# Patient Record
Sex: MALE | Race: WHITE
[De-identification: names, ages, dates, MRNs, and addresses within clinical notes are randomized per-mention and may not be internally consistent; named-entity substitution may affect disease eponyms.]

---

## 2017-08-15 ENCOUNTER — RX ONLY (OUTPATIENT)
Age: 77
Setting detail: RX ONLY
End: 2017-08-15

## 2017-08-29 PROBLEM — D49.2 NEOPLASM OF UNSPECIFIED BEHAVIOR OF BONE, SOFT TISSUE, AND SKIN: Status: RESOLVED | Noted: 2017-08-15 | Resolved: 2017-08-29

## 2021-01-15 DIAGNOSIS — Z23 NEED FOR VACCINATION: ICD-10-CM

## 2022-01-23 ENCOUNTER — APPOINTMENT (OUTPATIENT)
Dept: CARDIOLOGY | Facility: MEDICAL CENTER | Age: 82
DRG: 871 | End: 2022-01-23
Attending: STUDENT IN AN ORGANIZED HEALTH CARE EDUCATION/TRAINING PROGRAM
Payer: COMMERCIAL

## 2022-01-23 ENCOUNTER — HOSPITAL ENCOUNTER (INPATIENT)
Facility: MEDICAL CENTER | Age: 82
LOS: 1 days | DRG: 871 | End: 2022-01-24
Attending: EMERGENCY MEDICINE | Admitting: STUDENT IN AN ORGANIZED HEALTH CARE EDUCATION/TRAINING PROGRAM
Payer: COMMERCIAL

## 2022-01-23 ENCOUNTER — APPOINTMENT (OUTPATIENT)
Dept: RADIOLOGY | Facility: MEDICAL CENTER | Age: 82
DRG: 871 | End: 2022-01-23
Attending: EMERGENCY MEDICINE
Payer: COMMERCIAL

## 2022-01-23 ENCOUNTER — APPOINTMENT (OUTPATIENT)
Dept: RADIOLOGY | Facility: MEDICAL CENTER | Age: 82
DRG: 871 | End: 2022-01-23
Attending: STUDENT IN AN ORGANIZED HEALTH CARE EDUCATION/TRAINING PROGRAM
Payer: COMMERCIAL

## 2022-01-23 DIAGNOSIS — J18.9 PNEUMONIA OF BOTH LOWER LOBES DUE TO INFECTIOUS ORGANISM: ICD-10-CM

## 2022-01-23 DIAGNOSIS — R06.03 RESPIRATORY DISTRESS: ICD-10-CM

## 2022-01-23 DIAGNOSIS — I50.21 ACUTE SYSTOLIC HEART FAILURE (HCC): ICD-10-CM

## 2022-01-23 DIAGNOSIS — J44.1 CHRONIC OBSTRUCTIVE PULMONARY DISEASE WITH ACUTE EXACERBATION (HCC): ICD-10-CM

## 2022-01-23 DIAGNOSIS — J96.01 ACUTE RESPIRATORY FAILURE WITH HYPOXIA (HCC): ICD-10-CM

## 2022-01-23 PROBLEM — N17.9 ACUTE RENAL FAILURE (ARF) (HCC): Status: ACTIVE | Noted: 2022-01-23

## 2022-01-23 PROBLEM — A41.9 SEPSIS (HCC): Status: ACTIVE | Noted: 2022-01-23

## 2022-01-23 PROBLEM — I10 PRIMARY HYPERTENSION: Status: ACTIVE | Noted: 2022-01-23

## 2022-01-23 PROBLEM — E87.20 LACTIC ACIDOSIS: Status: ACTIVE | Noted: 2022-01-23

## 2022-01-23 PROBLEM — N40.0 BPH (BENIGN PROSTATIC HYPERPLASIA): Status: ACTIVE | Noted: 2022-01-23

## 2022-01-23 PROBLEM — R79.89 ELEVATED TROPONIN: Status: ACTIVE | Noted: 2022-01-23

## 2022-01-23 PROBLEM — R91.8 PULMONARY INFILTRATES: Status: ACTIVE | Noted: 2022-01-23

## 2022-01-23 PROBLEM — D72.829 LEUKOCYTOSIS: Status: ACTIVE | Noted: 2022-01-23

## 2022-01-23 PROBLEM — E78.5 DYSLIPIDEMIA: Status: ACTIVE | Noted: 2022-01-23

## 2022-01-23 LAB
ALBUMIN SERPL BCP-MCNC: 4.4 G/DL (ref 3.2–4.9)
ALBUMIN/GLOB SERPL: 1.5 G/DL
ALP SERPL-CCNC: 133 U/L (ref 30–99)
ALT SERPL-CCNC: <5 U/L (ref 2–50)
AMPHET UR QL SCN: NEGATIVE
ANION GAP SERPL CALC-SCNC: 14 MMOL/L (ref 7–16)
APPEARANCE UR: CLEAR
AST SERPL-CCNC: 16 U/L (ref 12–45)
BACTERIA #/AREA URNS HPF: ABNORMAL /HPF
BARBITURATES UR QL SCN: NEGATIVE
BASOPHILS # BLD AUTO: 0.6 % (ref 0–1.8)
BASOPHILS # BLD: 0.09 K/UL (ref 0–0.12)
BENZODIAZ UR QL SCN: NEGATIVE
BILIRUB SERPL-MCNC: 0.4 MG/DL (ref 0.1–1.5)
BILIRUB UR QL STRIP.AUTO: NEGATIVE
BUN SERPL-MCNC: 27 MG/DL (ref 8–22)
BZE UR QL SCN: NEGATIVE
CALCIUM SERPL-MCNC: 9.3 MG/DL (ref 8.4–10.2)
CANNABINOIDS UR QL SCN: NEGATIVE
CHLORIDE SERPL-SCNC: 105 MMOL/L (ref 96–112)
CO2 SERPL-SCNC: 20 MMOL/L (ref 20–33)
COLOR UR: YELLOW
CREAT SERPL-MCNC: 1.49 MG/DL (ref 0.5–1.4)
EKG IMPRESSION: NORMAL
EOSINOPHIL # BLD AUTO: 0.13 K/UL (ref 0–0.51)
EOSINOPHIL NFR BLD: 0.9 % (ref 0–6.9)
EPI CELLS #/AREA URNS HPF: NEGATIVE /HPF
ERYTHROCYTE [DISTWIDTH] IN BLOOD BY AUTOMATED COUNT: 49 FL (ref 35.9–50)
FLUAV RNA SPEC QL NAA+PROBE: NEGATIVE
FLUBV RNA SPEC QL NAA+PROBE: NEGATIVE
GLOBULIN SER CALC-MCNC: 3 G/DL (ref 1.9–3.5)
GLUCOSE SERPL-MCNC: 188 MG/DL (ref 65–99)
GLUCOSE UR STRIP.AUTO-MCNC: NEGATIVE MG/DL
HCT VFR BLD AUTO: 52.9 % (ref 42–52)
HGB BLD-MCNC: 17 G/DL (ref 14–18)
HYALINE CASTS #/AREA URNS LPF: ABNORMAL /LPF
IMM GRANULOCYTES # BLD AUTO: 0.09 K/UL (ref 0–0.11)
IMM GRANULOCYTES NFR BLD AUTO: 0.6 % (ref 0–0.9)
KETONES UR STRIP.AUTO-MCNC: NEGATIVE MG/DL
LACTATE BLD-SCNC: 2.4 MMOL/L (ref 0.5–2)
LACTATE BLD-SCNC: 2.4 MMOL/L (ref 0.5–2)
LACTATE BLD-SCNC: 2.5 MMOL/L (ref 0.5–2)
LACTATE BLD-SCNC: 3 MMOL/L (ref 0.5–2)
LACTATE BLD-SCNC: 3.1 MMOL/L (ref 0.5–2)
LEUKOCYTE ESTERASE UR QL STRIP.AUTO: NEGATIVE
LV EJECT FRACT  99904: 20
LV EJECT FRACT MOD 2C 99903: 26.62
LV EJECT FRACT MOD 4C 99902: 20.97
LV EJECT FRACT MOD BP 99901: 23.34
LYMPHOCYTES # BLD AUTO: 1.74 K/UL (ref 1–4.8)
LYMPHOCYTES NFR BLD: 12.1 % (ref 22–41)
MAGNESIUM SERPL-MCNC: 2.2 MG/DL (ref 1.5–2.5)
MCH RBC QN AUTO: 30.5 PG (ref 27–33)
MCHC RBC AUTO-ENTMCNC: 32.1 G/DL (ref 33.7–35.3)
MCV RBC AUTO: 94.8 FL (ref 81.4–97.8)
METHADONE UR QL SCN: NEGATIVE
MICRO URNS: ABNORMAL
MONOCYTES # BLD AUTO: 1.33 K/UL (ref 0–0.85)
MONOCYTES NFR BLD AUTO: 9.2 % (ref 0–13.4)
MUCOUS THREADS #/AREA URNS HPF: ABNORMAL /HPF
NEUTROPHILS # BLD AUTO: 11.01 K/UL (ref 1.82–7.42)
NEUTROPHILS NFR BLD: 76.6 % (ref 44–72)
NITRITE UR QL STRIP.AUTO: POSITIVE
NRBC # BLD AUTO: 0 K/UL
NRBC BLD-RTO: 0 /100 WBC
NT-PROBNP SERPL IA-MCNC: 9045 PG/ML (ref 0–125)
OPIATES UR QL SCN: NEGATIVE
OXYCODONE UR QL SCN: NEGATIVE
PCP UR QL SCN: NEGATIVE
PH UR STRIP.AUTO: 5.5 [PH] (ref 5–8)
PHOSPHATE SERPL-MCNC: 3.4 MG/DL (ref 2.5–4.5)
PLATELET # BLD AUTO: 270 K/UL (ref 164–446)
PMV BLD AUTO: 11.3 FL (ref 9–12.9)
POTASSIUM SERPL-SCNC: 4.2 MMOL/L (ref 3.6–5.5)
PROCALCITONIN SERPL-MCNC: 0.06 NG/ML
PROPOXYPH UR QL SCN: NEGATIVE
PROT SERPL-MCNC: 7.4 G/DL (ref 6–8.2)
PROT UR QL STRIP: NEGATIVE MG/DL
PTH-INTACT SERPL-MCNC: 180 PG/ML (ref 14–72)
RBC # BLD AUTO: 5.58 M/UL (ref 4.7–6.1)
RBC # URNS HPF: ABNORMAL /HPF
RBC UR QL AUTO: NEGATIVE
RSV RNA SPEC QL NAA+PROBE: NEGATIVE
SARS-COV-2 RNA RESP QL NAA+PROBE: NOTDETECTED
SODIUM SERPL-SCNC: 139 MMOL/L (ref 135–145)
SP GR UR STRIP.AUTO: 1.02
SPECIMEN SOURCE: NORMAL
TROPONIN T SERPL-MCNC: 114 NG/L (ref 6–19)
TROPONIN T SERPL-MCNC: 115 NG/L (ref 6–19)
TROPONIN T SERPL-MCNC: 69 NG/L (ref 6–19)
TROPONIN T SERPL-MCNC: 90 NG/L (ref 6–19)
TROPONIN T SERPL-MCNC: 96 NG/L (ref 6–19)
WBC # BLD AUTO: 14.4 K/UL (ref 4.8–10.8)
WBC #/AREA URNS HPF: ABNORMAL /HPF

## 2022-01-23 PROCEDURE — 87186 SC STD MICRODIL/AGAR DIL: CPT

## 2022-01-23 PROCEDURE — 99285 EMERGENCY DEPT VISIT HI MDM: CPT

## 2022-01-23 PROCEDURE — 94640 AIRWAY INHALATION TREATMENT: CPT

## 2022-01-23 PROCEDURE — 87077 CULTURE AEROBIC IDENTIFY: CPT | Mod: 91

## 2022-01-23 PROCEDURE — 84156 ASSAY OF PROTEIN URINE: CPT

## 2022-01-23 PROCEDURE — C9803 HOPD COVID-19 SPEC COLLECT: HCPCS | Performed by: EMERGENCY MEDICINE

## 2022-01-23 PROCEDURE — 76775 US EXAM ABDO BACK WALL LIM: CPT

## 2022-01-23 PROCEDURE — 80053 COMPREHEN METABOLIC PANEL: CPT

## 2022-01-23 PROCEDURE — 83735 ASSAY OF MAGNESIUM: CPT

## 2022-01-23 PROCEDURE — 93306 TTE W/DOPPLER COMPLETE: CPT | Mod: 26 | Performed by: INTERNAL MEDICINE

## 2022-01-23 PROCEDURE — 82436 ASSAY OF URINE CHLORIDE: CPT

## 2022-01-23 PROCEDURE — 83605 ASSAY OF LACTIC ACID: CPT | Mod: 91

## 2022-01-23 PROCEDURE — 83970 ASSAY OF PARATHORMONE: CPT

## 2022-01-23 PROCEDURE — 700111 HCHG RX REV CODE 636 W/ 250 OVERRIDE (IP): Performed by: EMERGENCY MEDICINE

## 2022-01-23 PROCEDURE — 87150 DNA/RNA AMPLIFIED PROBE: CPT

## 2022-01-23 PROCEDURE — 84100 ASSAY OF PHOSPHORUS: CPT

## 2022-01-23 PROCEDURE — 93306 TTE W/DOPPLER COMPLETE: CPT

## 2022-01-23 PROCEDURE — 80307 DRUG TEST PRSMV CHEM ANLYZR: CPT

## 2022-01-23 PROCEDURE — 82570 ASSAY OF URINE CREATININE: CPT

## 2022-01-23 PROCEDURE — A9270 NON-COVERED ITEM OR SERVICE: HCPCS | Performed by: HOSPITALIST

## 2022-01-23 PROCEDURE — 96375 TX/PRO/DX INJ NEW DRUG ADDON: CPT

## 2022-01-23 PROCEDURE — 87086 URINE CULTURE/COLONY COUNT: CPT

## 2022-01-23 PROCEDURE — 770020 HCHG ROOM/CARE - TELE (206)

## 2022-01-23 PROCEDURE — 81001 URINALYSIS AUTO W/SCOPE: CPT

## 2022-01-23 PROCEDURE — 36415 COLL VENOUS BLD VENIPUNCTURE: CPT

## 2022-01-23 PROCEDURE — 96368 THER/DIAG CONCURRENT INF: CPT

## 2022-01-23 PROCEDURE — 94669 MECHANICAL CHEST WALL OSCILL: CPT

## 2022-01-23 PROCEDURE — 700102 HCHG RX REV CODE 250 W/ 637 OVERRIDE(OP): Performed by: STUDENT IN AN ORGANIZED HEALTH CARE EDUCATION/TRAINING PROGRAM

## 2022-01-23 PROCEDURE — 700117 HCHG RX CONTRAST REV CODE 255: Performed by: STUDENT IN AN ORGANIZED HEALTH CARE EDUCATION/TRAINING PROGRAM

## 2022-01-23 PROCEDURE — 96365 THER/PROPH/DIAG IV INF INIT: CPT

## 2022-01-23 PROCEDURE — A9270 NON-COVERED ITEM OR SERVICE: HCPCS | Performed by: STUDENT IN AN ORGANIZED HEALTH CARE EDUCATION/TRAINING PROGRAM

## 2022-01-23 PROCEDURE — 87040 BLOOD CULTURE FOR BACTERIA: CPT | Mod: 91

## 2022-01-23 PROCEDURE — 99223 1ST HOSP IP/OBS HIGH 75: CPT | Mod: AI | Performed by: STUDENT IN AN ORGANIZED HEALTH CARE EDUCATION/TRAINING PROGRAM

## 2022-01-23 PROCEDURE — 71045 X-RAY EXAM CHEST 1 VIEW: CPT

## 2022-01-23 PROCEDURE — 83880 ASSAY OF NATRIURETIC PEPTIDE: CPT

## 2022-01-23 PROCEDURE — 700105 HCHG RX REV CODE 258: Performed by: EMERGENCY MEDICINE

## 2022-01-23 PROCEDURE — 85025 COMPLETE CBC W/AUTO DIFF WBC: CPT

## 2022-01-23 PROCEDURE — 94760 N-INVAS EAR/PLS OXIMETRY 1: CPT

## 2022-01-23 PROCEDURE — 700101 HCHG RX REV CODE 250: Performed by: EMERGENCY MEDICINE

## 2022-01-23 PROCEDURE — 93005 ELECTROCARDIOGRAM TRACING: CPT | Performed by: EMERGENCY MEDICINE

## 2022-01-23 PROCEDURE — 84300 ASSAY OF URINE SODIUM: CPT

## 2022-01-23 PROCEDURE — 84133 ASSAY OF URINE POTASSIUM: CPT

## 2022-01-23 PROCEDURE — 700105 HCHG RX REV CODE 258: Performed by: STUDENT IN AN ORGANIZED HEALTH CARE EDUCATION/TRAINING PROGRAM

## 2022-01-23 PROCEDURE — 700102 HCHG RX REV CODE 250 W/ 637 OVERRIDE(OP): Performed by: HOSPITALIST

## 2022-01-23 PROCEDURE — 0241U HCHG SARS-COV-2 COVID-19 NFCT DS RESP RNA 4 TRGT MIC: CPT

## 2022-01-23 PROCEDURE — 700111 HCHG RX REV CODE 636 W/ 250 OVERRIDE (IP): Performed by: STUDENT IN AN ORGANIZED HEALTH CARE EDUCATION/TRAINING PROGRAM

## 2022-01-23 PROCEDURE — 84145 PROCALCITONIN (PCT): CPT

## 2022-01-23 PROCEDURE — 84484 ASSAY OF TROPONIN QUANT: CPT | Mod: 91

## 2022-01-23 RX ORDER — SODIUM CHLORIDE, SODIUM LACTATE, POTASSIUM CHLORIDE, AND CALCIUM CHLORIDE .6; .31; .03; .02 G/100ML; G/100ML; G/100ML; G/100ML
500 INJECTION, SOLUTION INTRAVENOUS
Status: DISCONTINUED | OUTPATIENT
Start: 2022-01-23 | End: 2022-01-23

## 2022-01-23 RX ORDER — ACETAMINOPHEN 325 MG/1
650 TABLET ORAL EVERY 6 HOURS PRN
Status: DISCONTINUED | OUTPATIENT
Start: 2022-01-23 | End: 2022-01-24 | Stop reason: HOSPADM

## 2022-01-23 RX ORDER — METHYLPREDNISOLONE SODIUM SUCCINATE 125 MG/2ML
125 INJECTION, POWDER, LYOPHILIZED, FOR SOLUTION INTRAMUSCULAR; INTRAVENOUS EVERY 8 HOURS
Status: DISCONTINUED | OUTPATIENT
Start: 2022-01-23 | End: 2022-01-24 | Stop reason: HOSPADM

## 2022-01-23 RX ORDER — SODIUM CHLORIDE, SODIUM LACTATE, POTASSIUM CHLORIDE, AND CALCIUM CHLORIDE .6; .31; .03; .02 G/100ML; G/100ML; G/100ML; G/100ML
500 INJECTION, SOLUTION INTRAVENOUS ONCE
Status: COMPLETED | OUTPATIENT
Start: 2022-01-23 | End: 2022-01-23

## 2022-01-23 RX ORDER — ONDANSETRON 2 MG/ML
4 INJECTION INTRAMUSCULAR; INTRAVENOUS EVERY 4 HOURS PRN
Status: DISCONTINUED | OUTPATIENT
Start: 2022-01-23 | End: 2022-01-24 | Stop reason: HOSPADM

## 2022-01-23 RX ORDER — BISACODYL 10 MG
10 SUPPOSITORY, RECTAL RECTAL
Status: DISCONTINUED | OUTPATIENT
Start: 2022-01-23 | End: 2022-01-24 | Stop reason: HOSPADM

## 2022-01-23 RX ORDER — TAMSULOSIN HYDROCHLORIDE 0.4 MG/1
0.4 CAPSULE ORAL EVERY EVENING
Status: DISCONTINUED | OUTPATIENT
Start: 2022-01-23 | End: 2022-01-24 | Stop reason: HOSPADM

## 2022-01-23 RX ORDER — LORAZEPAM 0.5 MG/1
0.5 TABLET ORAL EVERY 4 HOURS PRN
Status: DISPENSED | OUTPATIENT
Start: 2022-01-23 | End: 2022-01-24

## 2022-01-23 RX ORDER — TAMSULOSIN HYDROCHLORIDE 0.4 MG/1
0.4 CAPSULE ORAL EVERY EVENING
COMMUNITY
End: 2022-12-31

## 2022-01-23 RX ORDER — GUAIFENESIN/DEXTROMETHORPHAN 100-10MG/5
10 SYRUP ORAL EVERY 6 HOURS PRN
Status: DISCONTINUED | OUTPATIENT
Start: 2022-01-23 | End: 2022-01-24 | Stop reason: HOSPADM

## 2022-01-23 RX ORDER — ATORVASTATIN CALCIUM 40 MG/1
40 TABLET, FILM COATED ORAL DAILY
Status: DISCONTINUED | OUTPATIENT
Start: 2022-01-23 | End: 2022-01-24 | Stop reason: HOSPADM

## 2022-01-23 RX ORDER — POLYETHYLENE GLYCOL 3350 17 G/17G
1 POWDER, FOR SOLUTION ORAL
Status: DISCONTINUED | OUTPATIENT
Start: 2022-01-23 | End: 2022-01-24 | Stop reason: HOSPADM

## 2022-01-23 RX ORDER — HYDRALAZINE HYDROCHLORIDE 20 MG/ML
10 INJECTION INTRAMUSCULAR; INTRAVENOUS EVERY 4 HOURS PRN
Status: DISCONTINUED | OUTPATIENT
Start: 2022-01-23 | End: 2022-01-24 | Stop reason: HOSPADM

## 2022-01-23 RX ORDER — IPRATROPIUM BROMIDE AND ALBUTEROL SULFATE 2.5; .5 MG/3ML; MG/3ML
3 SOLUTION RESPIRATORY (INHALATION)
Status: DISCONTINUED | OUTPATIENT
Start: 2022-01-23 | End: 2022-01-24 | Stop reason: HOSPADM

## 2022-01-23 RX ORDER — LISINOPRIL 40 MG/1
40 TABLET ORAL DAILY
COMMUNITY

## 2022-01-23 RX ORDER — HEPARIN SODIUM 5000 [USP'U]/ML
5000 INJECTION, SOLUTION INTRAVENOUS; SUBCUTANEOUS EVERY 8 HOURS
Status: DISCONTINUED | OUTPATIENT
Start: 2022-01-23 | End: 2022-01-24 | Stop reason: HOSPADM

## 2022-01-23 RX ORDER — SPIRONOLACTONE 25 MG/1
12.5 TABLET ORAL
Status: DISCONTINUED | OUTPATIENT
Start: 2022-01-24 | End: 2022-01-24

## 2022-01-23 RX ORDER — AMOXICILLIN 250 MG
2 CAPSULE ORAL 2 TIMES DAILY
Status: DISCONTINUED | OUTPATIENT
Start: 2022-01-23 | End: 2022-01-24 | Stop reason: HOSPADM

## 2022-01-23 RX ORDER — LISINOPRIL 2.5 MG/1
2.5 TABLET ORAL
Status: DISCONTINUED | OUTPATIENT
Start: 2022-01-24 | End: 2022-01-24

## 2022-01-23 RX ORDER — SODIUM CHLORIDE, SODIUM LACTATE, POTASSIUM CHLORIDE, AND CALCIUM CHLORIDE .6; .31; .03; .02 G/100ML; G/100ML; G/100ML; G/100ML
500 INJECTION, SOLUTION INTRAVENOUS ONCE
Status: DISCONTINUED | OUTPATIENT
Start: 2022-01-23 | End: 2022-01-23

## 2022-01-23 RX ORDER — AZITHROMYCIN 500 MG/1
500 INJECTION, POWDER, LYOPHILIZED, FOR SOLUTION INTRAVENOUS ONCE
Status: COMPLETED | OUTPATIENT
Start: 2022-01-23 | End: 2022-01-23

## 2022-01-23 RX ORDER — AZITHROMYCIN 250 MG/1
500 TABLET, FILM COATED ORAL DAILY
Status: DISCONTINUED | OUTPATIENT
Start: 2022-01-24 | End: 2022-01-24 | Stop reason: HOSPADM

## 2022-01-23 RX ORDER — ATORVASTATIN CALCIUM 40 MG/1
40 TABLET, FILM COATED ORAL DAILY
COMMUNITY

## 2022-01-23 RX ORDER — FUROSEMIDE 40 MG/1
40 TABLET ORAL
Status: DISCONTINUED | OUTPATIENT
Start: 2022-01-23 | End: 2022-01-23

## 2022-01-23 RX ORDER — METHYLPREDNISOLONE SODIUM SUCCINATE 125 MG/2ML
125 INJECTION, POWDER, LYOPHILIZED, FOR SOLUTION INTRAMUSCULAR; INTRAVENOUS ONCE
Status: COMPLETED | OUTPATIENT
Start: 2022-01-23 | End: 2022-01-23

## 2022-01-23 RX ORDER — GAUZE BANDAGE 2" X 2"
100 BANDAGE TOPICAL DAILY
Status: DISCONTINUED | OUTPATIENT
Start: 2022-01-23 | End: 2022-01-24 | Stop reason: HOSPADM

## 2022-01-23 RX ORDER — ONDANSETRON 4 MG/1
4 TABLET, ORALLY DISINTEGRATING ORAL EVERY 4 HOURS PRN
Status: DISCONTINUED | OUTPATIENT
Start: 2022-01-23 | End: 2022-01-24 | Stop reason: HOSPADM

## 2022-01-23 RX ADMIN — HEPARIN SODIUM 5000 UNITS: 5000 INJECTION, SOLUTION INTRAVENOUS; SUBCUTANEOUS at 14:57

## 2022-01-23 RX ADMIN — ALBUTEROL SULFATE 15 MG/HR: 5 SOLUTION RESPIRATORY (INHALATION) at 08:40

## 2022-01-23 RX ADMIN — HUMAN ALBUMIN MICROSPHERES AND PERFLUTREN 3 ML: 10; .22 INJECTION, SOLUTION INTRAVENOUS at 10:42

## 2022-01-23 RX ADMIN — SODIUM CHLORIDE, POTASSIUM CHLORIDE, SODIUM LACTATE AND CALCIUM CHLORIDE 500 ML: 600; 310; 30; 20 INJECTION, SOLUTION INTRAVENOUS at 11:01

## 2022-01-23 RX ADMIN — LORAZEPAM 0.5 MG: 0.5 TABLET ORAL at 21:07

## 2022-01-23 RX ADMIN — TAMSULOSIN HYDROCHLORIDE 0.4 MG: 0.4 CAPSULE ORAL at 18:03

## 2022-01-23 RX ADMIN — HEPARIN SODIUM 5000 UNITS: 5000 INJECTION, SOLUTION INTRAVENOUS; SUBCUTANEOUS at 21:06

## 2022-01-23 RX ADMIN — AMPICILLIN SODIUM AND SULBACTAM SODIUM 3 G: 2; 1 INJECTION, POWDER, FOR SOLUTION INTRAMUSCULAR; INTRAVENOUS at 08:44

## 2022-01-23 RX ADMIN — METHYLPREDNISOLONE SODIUM SUCCINATE 125 MG: 125 INJECTION, POWDER, FOR SOLUTION INTRAMUSCULAR; INTRAVENOUS at 21:07

## 2022-01-23 RX ADMIN — ATORVASTATIN CALCIUM 40 MG: 40 TABLET, FILM COATED ORAL at 21:07

## 2022-01-23 RX ADMIN — METHYLPREDNISOLONE SODIUM SUCCINATE 125 MG: 125 INJECTION, POWDER, FOR SOLUTION INTRAMUSCULAR; INTRAVENOUS at 17:00

## 2022-01-23 RX ADMIN — METHYLPREDNISOLONE SODIUM SUCCINATE 125 MG: 125 INJECTION, POWDER, FOR SOLUTION INTRAMUSCULAR; INTRAVENOUS at 08:44

## 2022-01-23 RX ADMIN — AZITHROMYCIN MONOHYDRATE 500 MG: 500 INJECTION, POWDER, LYOPHILIZED, FOR SOLUTION INTRAVENOUS at 08:44

## 2022-01-23 RX ADMIN — ASPIRIN 81 MG: 81 TABLET, COATED ORAL at 18:03

## 2022-01-23 RX ADMIN — THIAMINE HCL TAB 100 MG 100 MG: 100 TAB at 14:00

## 2022-01-23 RX ADMIN — SODIUM CHLORIDE 3 G: 900 INJECTION INTRAVENOUS at 14:58

## 2022-01-23 RX ADMIN — FUROSEMIDE 40 MG: 40 TABLET ORAL at 15:15

## 2022-01-23 ASSESSMENT — ENCOUNTER SYMPTOMS
CARDIOVASCULAR NEGATIVE: 1
WEAKNESS: 1
ABDOMINAL PAIN: 1
LOSS OF CONSCIOUSNESS: 0
CONSTIPATION: 0
EYES NEGATIVE: 1
NAUSEA: 0
CHILLS: 1
WHEEZING: 1
HEARTBURN: 0
VOMITING: 0
BLOOD IN STOOL: 0
SPUTUM PRODUCTION: 1
MUSCULOSKELETAL NEGATIVE: 1
DIARRHEA: 0
WEIGHT LOSS: 1
COUGH: 1
SHORTNESS OF BREATH: 1

## 2022-01-23 ASSESSMENT — PATIENT HEALTH QUESTIONNAIRE - PHQ9
2. FEELING DOWN, DEPRESSED, IRRITABLE, OR HOPELESS: NOT AT ALL
1. LITTLE INTEREST OR PLEASURE IN DOING THINGS: NOT AT ALL
SUM OF ALL RESPONSES TO PHQ9 QUESTIONS 1 AND 2: 0

## 2022-01-23 ASSESSMENT — COGNITIVE AND FUNCTIONAL STATUS - GENERAL
DAILY ACTIVITIY SCORE: 21
MOBILITY SCORE: 18
CLIMB 3 TO 5 STEPS WITH RAILING: A LITTLE
MOVING FROM LYING ON BACK TO SITTING ON SIDE OF FLAT BED: A LITTLE
STANDING UP FROM CHAIR USING ARMS: A LITTLE
SUGGESTED CMS G CODE MODIFIER MOBILITY: CK
SUGGESTED CMS G CODE MODIFIER DAILY ACTIVITY: CJ
DRESSING REGULAR LOWER BODY CLOTHING: A LITTLE
TOILETING: A LITTLE
MOVING TO AND FROM BED TO CHAIR: A LITTLE
TURNING FROM BACK TO SIDE WHILE IN FLAT BAD: A LITTLE
WALKING IN HOSPITAL ROOM: A LITTLE
HELP NEEDED FOR BATHING: A LITTLE

## 2022-01-23 ASSESSMENT — LIFESTYLE VARIABLES
HOW MANY TIMES IN THE PAST YEAR HAVE YOU HAD 5 OR MORE DRINKS IN A DAY: 0
TOTAL SCORE: 0
ON A TYPICAL DAY WHEN YOU DRINK ALCOHOL HOW MANY DRINKS DO YOU HAVE: 1
HAVE YOU EVER FELT YOU SHOULD CUT DOWN ON YOUR DRINKING: NO
HAVE PEOPLE ANNOYED YOU BY CRITICIZING YOUR DRINKING: NO
SUBSTANCE_ABUSE: 1
EVER HAD A DRINK FIRST THING IN THE MORNING TO STEADY YOUR NERVES TO GET RID OF A HANGOVER: NO
TOTAL SCORE: 0
TOTAL SCORE: 0
AVERAGE NUMBER OF DAYS PER WEEK YOU HAVE A DRINK CONTAINING ALCOHOL: 7
CONSUMPTION TOTAL: NEGATIVE
EVER FELT BAD OR GUILTY ABOUT YOUR DRINKING: NO
ALCOHOL_USE: YES

## 2022-01-23 ASSESSMENT — PAIN DESCRIPTION - PAIN TYPE: TYPE: ACUTE PAIN

## 2022-01-23 ASSESSMENT — FIBROSIS 4 INDEX
FIB4 SCORE: 2.262741699796952078
FIB4 SCORE: 2.262741699796952078

## 2022-01-23 NOTE — PROGRESS NOTES
Pt arrived to unit via gurney. Ambulated from gurney to bed, stand by assist. Tele monitor applied, vitals taken. Pt assessed. A&O x 4. Admit profile and med rec complete. Discussed POC with pt, including safety. Welcome folder provided and discussed. Communication board filled out. Questions and concerns addressed, verbalized understanding. Fall precautions in place. Pt demonstrates ability to use call light appropriately. Pt left in lowest position. Bed locked and low, bed alarm on.

## 2022-01-23 NOTE — ED NOTES
Med rec updated and complete, per pt and [phone]  Allergies reviewed, per pt  [facility] pt [facility] ATORVASTATIN 40MG on 2/8/2021 for 30 day supply, LISINOPRIL 20MG 1 tablet BID on 2/8/2021 for 30 day supply, TAMSULOSIN 0.4MG on 4/19/2021 for 90 day supply, and AMLODIPINE 10MG on 2/18/2021 for 30 day supply, per pt has stopped this medication and has not taken any in over 5-6 months.  Pt reports that he has taking his medications with in this month.

## 2022-01-23 NOTE — ASSESSMENT & PLAN NOTE
Noted on chest x-ray  Procalcitonin low  Concerning for CHF, or aspiration  Management as per above

## 2022-01-23 NOTE — ASSESSMENT & PLAN NOTE
Etiology is unclear  Sepsis and malnutrition concerning for prerenal   BUN/creatinine ratio 18  Urine electrolytes  Avoid nephrotoxin  Renal dose meds  Encourage PO intake   If worsens may need nephrology consult

## 2022-01-23 NOTE — CONSULTS
I was called to discuss this patients care with Dr. Conner. We discussed overall case.    At this time it was deemed no formal in person cardiology consultation was necessary, however if this changes due to changes in patient condition or abnormal test results, please re-consult cardiology.    ADDENDUM:  TTE SHOWED LVEF OF 20%. AT THIS TIME, PATIENT IS HEMODYNAMICALLY STABLE AND URGENT CONSULTATION IS NOT NEEDED BY PRIMARY TEAM. AS [facility] IS EXPERIENCING HIGH QUANITY OF STEMI ACTIVATION, PATIENT WILL BE SEEN FORMALLY TOMORROW MORNING BY [facility] CARDIOLOGIST DR. GONZALEZ.     Electronically Signed by:    Eddie Jones M.D.      1/24/2022    01:51 AM

## 2022-01-23 NOTE — ASSESSMENT & PLAN NOTE
Trops 60s->110s  Likely secondary to demand ischemia in the setting of AHRF/Sepsis  Reported history of CAD  CAD not R/O  Continue home aspirin and statin for now  Hold nebs for now  Trend troponins  Echo noted

## 2022-01-23 NOTE — ASSESSMENT & PLAN NOTE
Continue home tamsulosin and self-catheterization  Pending urinalysis and bladder scan  Pending renal ultrasound  Monitor

## 2022-01-23 NOTE — ASSESSMENT & PLAN NOTE
History of chronic smoking along with barrel chest chest x-ray suggestive of COPD  [facility] and at the moment we dont have records to prove PFT diagnosis  Pt needs outpatient PFTs  Worsening dyspnea along with productive cough and amount of sputum  Procalcitonin low  duonebs held for tachycardia   IV steroids  Azithromycin for anti-inflammatory benefits  Up to chair for all meals  Pulmonary consult

## 2022-01-23 NOTE — ASSESSMENT & PLAN NOTE
Continue home statin for now, elevated troponins and possibly worsening CAD, pending echo  Reevaluated prior to discharge, questionable benefit on a 81 years old

## 2022-01-23 NOTE — ED PROVIDER NOTES
"ED Provider  Scribed for Kunal Schulz D.O. by Angella Cardoza. 1/23/2022  6:58 AM    Means of arrival:EMS  History obtained from:Patient  History limited by: None    CHIEF COMPLAINT  Chief Complaint   Patient presents with    Shortness of Breath     started approx 0400    Cough     for the last 2wks     HPI  Laron Doherty is a 81 y.o. male who presents to the ED via EMS for sudden onset worsening shortness of breath onset 4:00 AM this morning. Shortness of breath is exacerbated with exertion. Patient's oxygen saturation had a reading of 80%. He normally is on oxygen at home at an as-needed basis, but denies any history of COPD or chronic lung disorders. He expressed that he only felt he needed oxygen this morning during his dyspnea episode. Prior to EMS, patient was only able to speak 2-3 words. After being given oxygen and albuterol, patient spoke in complete sentences. According to the patient, he has had associated chills since October and coughing onset 2 weeks ago. Patient denies nausea, diarrhea, or vomiting. Patient reports receiving his COVID vaccination. He smokes 18 cigarettes daily for several years.     REVIEW OF SYSTEMS  See HPI for further details. All other systems are negative.     PAST MEDICAL HISTORY   has a past medical history of Benign tumor of brain (HCC).    SOCIAL HISTORY  Social History     Tobacco Use    Smoking status: Current Every Day Smoker    Smokeless tobacco: Not noted   Substance and Sexual Activity    Alcohol use: Yes    Drug use: No       SURGICAL HISTORY   has a past surgical history that includes other orthopedic surgery.    CURRENT MEDICATIONS  No current outpatient medications     ALLERGIES  No Known Allergies    PHYSICAL EXAM  /78   Pulse (!) 121   Resp (!) 24   Ht 1.841 m (6' 0.48\")   Wt 57 kg (125 lb 10.6 oz)   SpO2 93%   BMI 16.82 kg/m²   Constitutional: Mild to moderate distress  HENT: No signs of trauma, mucous membranes are moist  Eyes: Conjunctiva " normal, Non-icteric.   Neck: Normal range of motion, No tenderness, Supple.  Lymphatic: No lymphadenopathy noted.   Cardiovascular: Regular rate and rhythm, no murmurs.   Thorax & Lungs:  Moderate respiratory distress worse with exertion, decreased air movement throughout, moderate accessory muscle use. No chest tenderness.   Abdomen: Bowel sounds normal, Soft, No tenderness, No masses, No pulsatile masses. No peritoneal signs.  Skin: Warm, Dry, normal color.   Back: No bony tenderness, No CVA tenderness.   Extremities: No edema, No tenderness, No cyanosis  Musculoskeletal: Good range of motion in all major joints. No tenderness to palpation or major deformities noted.   Neurologic: Alert and oriented x4, Normal motor function, Normal sensory function, No focal deficits noted.   Psychiatric: Affect normal, Judgment normal, Mood normal.       DIAGNOSTIC STUDIES / PROCEDURES    EKG  12 Lead EKG interpreted by me shown below.      LABS  Results for orders placed or performed during the hospital encounter of 01/23/22   LACTIC ACID   Result Value Ref Range    Lactic Acid 2.5 (H) 0.5 - 2.0 mmol/L   CBC WITH DIFFERENTIAL   Result Value Ref Range    WBC 14.4 (H) 4.8 - 10.8 K/uL    RBC 5.58 4.70 - 6.10 M/uL    Hemoglobin 17.0 14.0 - 18.0 g/dL    Hematocrit 52.9 (H) 42.0 - 52.0 %    MCV 94.8 81.4 - 97.8 fL    MCH 30.5 27.0 - 33.0 pg    MCHC 32.1 (L) 33.7 - 35.3 g/dL    RDW 49.0 35.9 - 50.0 fL    Platelet Count 270 164 - 446 K/uL    MPV 11.3 9.0 - 12.9 fL    Neutrophils-Polys 76.60 (H) 44.00 - 72.00 %    Lymphocytes 12.10 (L) 22.00 - 41.00 %    Monocytes 9.20 0.00 - 13.40 %    Eosinophils 0.90 0.00 - 6.90 %    Basophils 0.60 0.00 - 1.80 %    Immature Granulocytes 0.60 0.00 - 0.90 %    Nucleated RBC 0.00 /100 WBC    Neutrophils (Absolute) 11.01 (H) 1.82 - 7.42 K/uL    Lymphs (Absolute) 1.74 1.00 - 4.80 K/uL    Monos (Absolute) 1.33 (H) 0.00 - 0.85 K/uL    Eos (Absolute) 0.13 0.00 - 0.51 K/uL    Baso (Absolute) 0.09 0.00 - 0.12  K/uL    Immature Granulocytes (abs) 0.09 0.00 - 0.11 K/uL    NRBC (Absolute) 0.00 K/uL   COMP METABOLIC PANEL   Result Value Ref Range    Sodium 139 135 - 145 mmol/L    Potassium 4.2 3.6 - 5.5 mmol/L    Chloride 105 96 - 112 mmol/L    Co2 20 20 - 33 mmol/L    Anion Gap 14.0 7.0 - 16.0    Glucose 188 (H) 65 - 99 mg/dL    Bun 27 (H) 8 - 22 mg/dL    Creatinine 1.49 (H) 0.50 - 1.40 mg/dL    Calcium 9.3 8.4 - 10.2 mg/dL    AST(SGOT) 16 12 - 45 U/L    ALT(SGPT) <5 2 - 50 U/L    Alkaline Phosphatase 133 (H) 30 - 99 U/L    Total Bilirubin 0.4 0.1 - 1.5 mg/dL    Albumin 4.4 3.2 - 4.9 g/dL    Total Protein 7.4 6.0 - 8.2 g/dL    Globulin 3.0 1.9 - 3.5 g/dL    A-G Ratio 1.5 g/dL   TROPONIN   Result Value Ref Range    Troponin T 69 (H) 6 - 19 ng/L   COV-2, FLU A/B, AND RSV BY PCR (2-4 HOURS CEPHEID): Collect NP swab in VTM    Specimen: Respirate   Result Value Ref Range    Influenza virus A RNA Negative Negative    Influenza virus B, PCR Negative Negative    RSV, PCR Negative Negative    SARS-CoV-2 by PCR NotDetected     SARS-CoV-2 Source NP Swab    LACTIC ACID   Result Value Ref Range    Lactic Acid 2.4 (H) 0.5 - 2.0 mmol/L   ESTIMATED GFR   Result Value Ref Range    GFR If  55 (A) >60 mL/min/1.73 m 2    GFR If Non African American 45 (A) >60 mL/min/1.73 m 2   PROCALCITONIN   Result Value Ref Range    Procalcitonin 0.06 <0.25 ng/mL   proBrain Natriuretic Peptide, NT   Result Value Ref Range    NT-proBNP 9045 (H) 0 - 125 pg/mL   MAGNESIUM   Result Value Ref Range    Magnesium 2.2 1.5 - 2.5 mg/dL   TROPONIN   Result Value Ref Range    Troponin T 115 (H) 6 - 19 ng/L   PHOSPHORUS   Result Value Ref Range    Phosphorus 3.4 2.5 - 4.5 mg/dL   EKG (NOW)   Result Value Ref Range    Report       [facility] Emergency Dept[facility]    Test Date:  2022-01-23  Pt Name:    SALIMA WONG                 Department: EDSM  MRN:        [id number]                      Room:       -ROOM 3  Gender:     Male                          Technician: [id number]  :        [id number]                   Requested By:CORBY CASTILLO  Order #:    [id number]                    Reading MD: CORBY CASTILLO D.O.    Measurements  Intervals                                Axis  Rate:       119                          P:  AK:                                      QRS:        -32  QRSD:       124                          T:          174  QT:         323  QTc:        455    Interpretive Statements  Sinus tachycardia with multiple PVCs  Paired ventricular premature complexes  Left bundle branch block  Compared to ECG 2009 15:52:57  Ventricular premature complex(es) now present  Left bundle-branch block now present  Sinus rhythm no longer present  Intraventricular conduction delay no longer  present  Left ventricular hypertrophy no longer present  Early repolarization no longer present  Electronically Signed On 2022 8:09:42 PST by CORBY CASTILLO D.O.        All labs reviewed by me.    RADIOLOGY  EC-ECHOCARDIOGRAM COMPLETE W/ CONT         US-RENAL   Final Result         Indeterminate a 2.5 cm calcified mass in the upper pole right kidney or adjacent adrenal gland. Further evaluation with CT with contrast is recommended.      Enlarged prostate.         DX-CHEST-PORTABLE (1 VIEW)   Final Result      Bilateral lower lobe predominant pulmonary infiltrate.        The radiologist's interpretations of all radiological studies have been reviewed by me.    Films have been independently by me      COURSE  Pertinent Labs & Imaging studies reviewed. (See chart for details)    6:58 AM - Patient seen and examined at bedside. Patient was brought in by EMS where vitals were discussed. Patient denies any chronic lung disorders but has at-home oxygen as needed. He felt short of breath this morning and continues to express increased work of breathing at bedside. Discussed plan of care. Ordered for DX-chest, Lactic acid, Cov-2/Flu/RSV by PCR, CBC with diff,  CMP, UA, blood culture, Troponin, EKG to evaluate his symptoms.     8:10 AM - Ordered Unasyn 3g in NS 100mL IV and Zithromax 500mg injection to treat patient. Ordered Procalcitonin and pBNP to evaluate patient. Consulted Dr. Dalila Osuna (Hospitalist) who agrees to further evaluate patient.    8:18 AM - Ordered Solumedrol 125mg injection and Albuterol 2.5mg/.5mL nebulizer to treat patient.      MEDICAL DECISION MAKING  This is a 81 y.o. male who presents with complaints of shortness of breath.  States he has no cardiac or pulmonary history but he is prescribed oxygen to use on a as needed basis.  Paramedics found his pulse oximetry at home to be 85%.  He is tachypneic, and tachycardic here.  He did receive a treatment in route which did help his respiratory effort.    My evaluation he has exertional dyspnea and conversational dyspnea, he has tachypnea with accessory muscle use.  COVID was suspected, x-ray shows bilateral pneumonia.  Septic protocols were initiated and IV antibiotics were started.    His troponin is elevated at 69, this is an indeterminate range.  His EKG shows no myocardial infarction.  The reading shows a tachycardia with some irregularities however it appears to be an underlying sinus rhythm with PVCs.    I spoke with the hospitalist for admission the patient will be admitted for further evaluation and treatment.    Critical care time 30 minutes      DISPOSITION:  Patient will be hospitalized by Dr. Dalila Osuna in guarded condition.      FINAL IMPRESSION  1. Acute respiratory failure with hypoxia (HCC)    2. Respiratory distress    3. Pneumonia of both lower lobes due to infectious organism    4. Chronic obstructive pulmonary disease with acute exacerbation (HCC)         Angella GRUBER), am scribing for, and in the presence of, Kunal Schulz D.O..    Electronically signed by: Angella Roman), 1/23/2022    Kunal GRUBER D.O. personally performed the  services described in this documentation, as scribed by Angella Cardoza in my presence, and it is both accurate and complete. C    The note accurately reflects work and decisions made by me.  Kunal Schulz D.O.  1/23/2022  10:50 AM

## 2022-01-23 NOTE — ASSESSMENT & PLAN NOTE
Requiring 4 L nasal cannula to maintain saturations  Chronic history of tobacco use and CAD  Chest x-ray with bilateral lower lobe infiltrates and barrel chest  DDx: Sepsis/pneumonia, COPD exacerbation, CHF, malignancy  Continue management for sepsis and COPD exacerbation  Pending echocardiogram  Frequent incentive spirometer  Up to chair for meals  Titrate oxygen as tolerable  [facility]  Pulmonary consulted

## 2022-01-23 NOTE — ASSESSMENT & PLAN NOTE
This is Sepsis Present on admission  SIRS criteria identified on my evaluation include: Tachycardia, with heart rate greater than 90 BPM, Tachypnea, with respirations greater than 20 per minute and Leukocytosis, with WBC greater than 12,000  Source is lung  Sepsis protocol initiated  Fluid resuscitation ordered per protocol  IV antibiotics as appropriate for source of sepsis  While organ dysfunction may be noted elsewhere in this problem list or in the chart, degree of organ dysfunction does not meet CMS criteria for severe sepsis    SIRS 3/4 and qSOFA 1/3  History of alcohol consumption concerning for possible aspiration  History of BPH/straight cath concerning for possible urinary tract etiologies  Viral panel negative and procalcitonin low  Procalcitonin low  Continue IV antibiotics for now  renal ultrasound- no obstruction  Follow-up blood cultures

## 2022-01-23 NOTE — H&P
Hospital Medicine History & Physical Note    Date of Service  1/23/2022    Primary Care Physician  Pcp Pt States None    Consultants  None    Code Status  DNAR/DNI    Chief Complaint  Chief Complaint   Patient presents with   • Shortness of Breath     started approx 0400   • Cough     for the last 2wks       History of Presenting Illness  81-year-old male  with a past medical history of tobacco use, dyslipidemia on atorvastatin, CAD on ASA, BPH on tamsulosin hypertension on lisinopril and amlodipine, and [facility] for acute hypoxic respiratory failure requiring presents emergency department via EMS for sudden onset worsening dyspnea.  Patient reports a 2-week history of cough and dyspnea on exertion.  Today around 4 AM his dyspnea acutely worsened which prompted him to call EMS.  Patient reports orthopnea for the past couple months and has been sleeping in a recliner.  His dyspnea worsens on exertion and his cough is usually dry but has become productive when laying on side.  He straight caths around 2-3 times a day.  He does admit to drinking 1-2 whiskeys a day.  No tremors or withdrawal symptoms on alcohol cessation.  No chest pain, orthostatic changes or loss of consciousness.    As per chart review, EMS reporting that patient only able to speak in 2-3 words which improved after starting oxygen and receiving a dose of albuterol.    At the emergency department, vital signs with tachycardia in the 120s, tachypnea in the 20s.  Patient requiring 4 L nasal cannula with a saturation.  CBC with leukocytosis at 14.4, no anemia.  Chemistry with BUN 27 and creatinine 1.49. troponin 69 ->115.  Lactic acid 2.4.      Blood samples were collected for culture and patient was started on the Unasyn/azithromycin antibiotic regimen received a dose of albuterol and IV methylprednisolone.  Patient was admitted for sepsis secondary to pneumonia and COPD exacerbation which required IV antibiotics, steroids,  telemetry monitoring and further work-up.    I discussed the plan of care with patient.    Review of Systems  Review of Systems   Constitutional: Positive for chills, malaise/fatigue and weight loss.   HENT: Negative.    Eyes: Negative.    Respiratory: Positive for cough, sputum production, shortness of breath and wheezing.    Cardiovascular: Negative.  Negative for chest pain and leg swelling.   Gastrointestinal: Positive for abdominal pain. Negative for blood in stool, constipation, diarrhea, heartburn, melena, nausea and vomiting.   Genitourinary: Positive for dysuria and urgency.   Musculoskeletal: Negative.    Skin: Negative.    Neurological: Positive for weakness. Negative for loss of consciousness.   Endo/Heme/Allergies: Negative.    Psychiatric/Behavioral: Positive for substance abuse.       Past Medical History   has a past medical history of Benign tumor of brain (HCC).    Surgical History   has a past surgical history that includes other orthopedic surgery.     Family History  Family history reviewed with patient. There is no family history that is pertinent to the chief complaint.     Social History   reports that he has been smoking. He does not have any smokeless tobacco history on file. He reports current alcohol use. He reports that he does not use drugs.    Allergies  No Known Allergies    Medications  Prior to Admission Medications   Prescriptions Last Dose Informant Patient Reported? Taking?   Ascorbic Acid (VITAMIN C PO) 1/22/2022 at 1800 Patient Yes Yes   Sig: Take 2 Tablets by mouth every evening.   aspirin EC (ECOTRIN) 81 MG Tablet Delayed Response 1/22/2022 at 1800 Patient Yes Yes   Sig: Take 81 mg by mouth every evening.   atorvastatin (LIPITOR) 40 MG Tab > 2 weeks at Unknown Patient's Home Pharmacy Yes Yes   Sig: Take 40 mg by mouth every day.   lisinopril (PRINIVIL) 20 MG Tab > 2 days at AM Patient's Home Pharmacy Yes Yes   Sig: Take 20 mg by mouth 2 times a day.   tamsulosin (FLOMAX) 0.4  MG capsule 1/22/2022 at 1800 Patient's Home Pharmacy Yes Yes   Sig: Take 0.4 mg by mouth every evening.      Facility-Administered Medications: None       Physical Exam  Pulse:  [110-124] 110  Resp:  [18-51] 18  BP: (118-148)/(78) 148/78  SpO2:  [88 %-96 %] 92 %  Blood Pressure : 118/78       Pulse: (!) 121   Respiration: (!) 22   Pulse Oximetry: 93 %       Physical Exam  Constitutional:       General: He is not in acute distress.     Appearance: Normal appearance. He is ill-appearing.      Comments: Frail and cachectic   HENT:      Head: Normocephalic and atraumatic.      Nose: Nose normal. No congestion.      Mouth/Throat:      Mouth: Mucous membranes are dry.   Eyes:      Extraocular Movements: Extraocular movements intact.      Pupils: Pupils are equal, round, and reactive to light.   Cardiovascular:      Rate and Rhythm: Tachycardia present. Rhythm irregular.      Pulses: Normal pulses.      Heart sounds: Normal heart sounds.   Pulmonary:      Effort: Respiratory distress present.      Comments: Low breath sounds bilaterally  Abdominal:      General: Bowel sounds are normal. There is no distension.      Palpations: Abdomen is soft.      Tenderness: There is no abdominal tenderness. There is no guarding or rebound.   Musculoskeletal:         General: No swelling. Normal range of motion.      Cervical back: Normal range of motion and neck supple.      Right lower leg: No edema.      Left lower leg: No edema.   Skin:     General: Skin is warm.      Coloration: Skin is not jaundiced.   Neurological:      General: No focal deficit present.      Mental Status: He is alert and oriented to person, place, and time. Mental status is at baseline.      Cranial Nerves: No cranial nerve deficit.   Psychiatric:         Mood and Affect: Mood normal.         Behavior: Behavior normal.         Thought Content: Thought content normal.         Judgment: Judgment normal.         Laboratory:  Recent Labs     01/23/22  0700   WBC  14.4*   RBC 5.58   HEMOGLOBIN 17.0   HEMATOCRIT 52.9*   MCV 94.8   MCH 30.5   MCHC 32.1*   RDW 49.0   PLATELETCT 270   MPV 11.3     Recent Labs     01/23/22  0700   SODIUM 139   POTASSIUM 4.2   CHLORIDE 105   CO2 20   GLUCOSE 188*   BUN 27*   CREATININE 1.49*   CALCIUM 9.3     Recent Labs     01/23/22  0700   ALTSGPT <5   ASTSGOT 16   ALKPHOSPHAT 133*   TBILIRUBIN 0.4   GLUCOSE 188*         Recent Labs     01/23/22  0700   NTPROBNP 9045*         Recent Labs     01/23/22  0700 01/23/22  0908   TROPONINT 69* 115*       Imaging:  DX-CHEST-PORTABLE (1 VIEW)   Final Result      Bilateral lower lobe predominant pulmonary infiltrate.      EC-ECHOCARDIOGRAM COMPLETE W/O CONT    (Results Pending)   US-RENAL    (Results Pending)     Assessment/Plan:  I anticipate this patient will require at least two midnights for appropriate medical management, necessitating inpatient admission.    * Sepsis (HCC)- (present on admission)  Assessment & Plan  This is Sepsis Present on admission  SIRS criteria identified on my evaluation include: Tachycardia, with heart rate greater than 90 BPM, Tachypnea, with respirations greater than 20 per minute and Leukocytosis, with WBC greater than 12,000  Source is lung  Sepsis protocol initiated  Fluid resuscitation ordered per protocol  IV antibiotics as appropriate for source of sepsis  While organ dysfunction may be noted elsewhere in this problem list or in the chart, degree of organ dysfunction does not meet CMS criteria for severe sepsis    SIRS 3/4 and qSOFA 1/3  History of alcohol consumption concerning for possible aspiration  History of BPH/straight cath concerning for possible urinary tract etiologies  Viral panel negative and procalcitonin low  Procalcitonin low  Continue IV antibiotics for now  Pending renal ultrasound  Follow-up blood cultures  Pending procalcitonin  Labs on AM    COPD exacerbation (HCC)- (present on admission)  Assessment & Plan  History of chronic smoking along with  barrel chest chest x-ray suggestive of COPD  [facility] and [facility] the [facility] we will have records to prove PFT diagnosis  Worsening dyspnea along with productive cough and amount of sputum  Procalcitonin low  Continue duo nebs per RT  IV steroids  Azithromycin for anti-inflammatory benefits  Up to chair for all meals  Pending ABG    BPH (benign prostatic hyperplasia)- (present on admission)  Assessment & Plan  Continue home tamsulosin and self-catheterization  Pending urinalysis and bladder scan  Pending renal ultrasound  Monitor    Lactic acidosis- (present on admission)  Assessment & Plan  Lactic acid 2.4 -> 2.5  Secondary to sepsis  As needed IV bolus  Trend    Acute renal failure (ARF) (HCC)- (present on admission)  Assessment & Plan  Etiology is unclear  Sepsis and malnutrition concerning for prerenal but chronic history of BPH requiring straight cath concerning for postrenal  BUN/creatinine ratio 18  Urine electrolytes  Pending bladder scan  Pending renal ultrasound  Avoid nephrotoxin  Renal dose meds  Labs in a.m.    Acute respiratory failure with hypoxia (HCC)- (present on admission)  Assessment & Plan  Requiring 4 L nasal cannula to maintain saturations  Chronic history of tobacco use and CAD  Chest x-ray with bilateral lower lobe infiltrates and barrel chest  DDx: Sepsis/pneumonia, COPD exacerbation, CHF, malignancy  Continue management for sepsis and COPD exacerbation  Pending echocardiogram  Frequent incentive spirometer  Up to chair for meals  Titrate oxygen as tolerable  [facility] records    Leukocytosis- (present on admission)  Assessment & Plan  Secondary to sepsis  IV antibiotics for now  Labs on a.m.    Elevated troponin- (present on admission)  Assessment & Plan  Trops 60s->110s  Likely secondary to demand ischemia in the setting of AHRF/Sepsis  Reported history of CAD  CAD not R/O  Continue home aspirin and statin for now  Hold nebs for now  Trend troponins  Pending ECHO    Dyslipidemia-  (present on admission)  Assessment & Plan  Continue home statin for now, elevated troponins and possibly worsening CAD, pending echo  Reevaluated prior to discharge, questionable benefit on a 81 years old    Pulmonary infiltrates- (present on admission)  Assessment & Plan  Noted on chest x-ray  Procalcitonin low  Concerning for CHF, or aspiration  Management as per above    Primary hypertension- (present on admission)  Assessment & Plan  Hold antihypertensives in the setting of sepsis  As needed antihypertensives      VTE prophylaxis: heparin ppx

## 2022-01-23 NOTE — PROGRESS NOTES
4 Eyes Skin Assessment Completed by MER Brand and MER Purdy.    Head WDL  Ears Redness and Blanching  Nose WDL  Mouth WDL  Neck WDL  Breast/Chest Bruising, Dry  Shoulder Blades WDL  Spine WDL  (R) Arm/Elbow/Hand Discoloration  (L) Arm/Elbow/Hand Discoloration  Abdomen WDL  Groin WDL  Scrotum/Coccyx/Buttocks Redness and Blanching  (R) Leg WDL  (L) Leg WDL  (R) Heel/Foot/Toe Redness and Rash  (L) Heel/Foot/Toe Redness, Blanching and Rash          Devices In Places Tele Box and Nasal Cannula      Interventions In Place Gray Ear Foams, Pillows and Pressure Redistribution Mattress    Possible Skin Injury No    Pictures Uploaded Into Epic N/A  Wound Consult Placed N/A  RN Wound Prevention Protocol Ordered No

## 2022-01-23 NOTE — ED NOTES
francois from Lab called with critical result of troponin 115 at 0945. Critical lab result read back to francois.   Dr. elder notified of critical lab result at 0945.  Critical lab result read back by Dr. elder.

## 2022-01-24 ENCOUNTER — HOSPITAL ENCOUNTER (INPATIENT)
Facility: MEDICAL CENTER | Age: 82
LOS: 4 days | DRG: 280 | End: 2022-01-28
Attending: STUDENT IN AN ORGANIZED HEALTH CARE EDUCATION/TRAINING PROGRAM | Admitting: INTERNAL MEDICINE
Payer: COMMERCIAL

## 2022-01-24 VITALS
HEIGHT: 71 IN | TEMPERATURE: 97.2 F | RESPIRATION RATE: 19 BRPM | OXYGEN SATURATION: 96 % | DIASTOLIC BLOOD PRESSURE: 78 MMHG | BODY MASS INDEX: 14.53 KG/M2 | HEART RATE: 138 BPM | WEIGHT: 103.8 LBS | SYSTOLIC BLOOD PRESSURE: 151 MMHG

## 2022-01-24 DIAGNOSIS — R91.8 PULMONARY INFILTRATES: ICD-10-CM

## 2022-01-24 DIAGNOSIS — J96.21 ACUTE ON CHRONIC RESPIRATORY FAILURE WITH HYPOXIA (HCC): ICD-10-CM

## 2022-01-24 PROBLEM — Z72.0 TOBACCO ABUSE: Status: ACTIVE | Noted: 2022-01-24

## 2022-01-24 PROBLEM — I50.41 ACUTE COMBINED SYSTOLIC (CONGESTIVE) AND DIASTOLIC (CONGESTIVE) HEART FAILURE (HCC): Status: ACTIVE | Noted: 2022-01-24

## 2022-01-24 PROBLEM — N40.1 BENIGN PROSTATIC HYPERPLASIA WITH URINARY RETENTION: Status: ACTIVE | Noted: 2022-01-23

## 2022-01-24 PROBLEM — R33.8 BENIGN PROSTATIC HYPERPLASIA WITH URINARY RETENTION: Status: ACTIVE | Noted: 2022-01-23

## 2022-01-24 LAB
ALBUMIN SERPL BCP-MCNC: 3.6 G/DL (ref 3.2–4.9)
ALBUMIN SERPL BCP-MCNC: 3.9 G/DL (ref 3.2–4.9)
ALBUMIN/GLOB SERPL: 1.6 G/DL
ALBUMIN/GLOB SERPL: 1.6 G/DL
ALP SERPL-CCNC: 104 U/L (ref 30–99)
ALP SERPL-CCNC: 91 U/L (ref 30–99)
ALT SERPL-CCNC: 11 U/L (ref 2–50)
ALT SERPL-CCNC: 8 U/L (ref 2–50)
ANION GAP SERPL CALC-SCNC: 11 MMOL/L (ref 7–16)
ANION GAP SERPL CALC-SCNC: 15 MMOL/L (ref 7–16)
AST SERPL-CCNC: 19 U/L (ref 12–45)
AST SERPL-CCNC: 29 U/L (ref 12–45)
BASOPHILS # BLD AUTO: 0.1 % (ref 0–1.8)
BASOPHILS # BLD: 0.01 K/UL (ref 0–0.12)
BILIRUB SERPL-MCNC: 0.4 MG/DL (ref 0.1–1.5)
BILIRUB SERPL-MCNC: 0.4 MG/DL (ref 0.1–1.5)
BUN SERPL-MCNC: 30 MG/DL (ref 8–22)
BUN SERPL-MCNC: 37 MG/DL (ref 8–22)
CALCIUM SERPL-MCNC: 8.8 MG/DL (ref 8.4–10.2)
CALCIUM SERPL-MCNC: 8.9 MG/DL (ref 8.4–10.2)
CHLORIDE SERPL-SCNC: 106 MMOL/L (ref 96–112)
CHLORIDE SERPL-SCNC: 107 MMOL/L (ref 96–112)
CHLORIDE UR-SCNC: 111 MMOL/L
CO2 SERPL-SCNC: 18 MMOL/L (ref 20–33)
CO2 SERPL-SCNC: 22 MMOL/L (ref 20–33)
CREAT SERPL-MCNC: 1.54 MG/DL (ref 0.5–1.4)
CREAT SERPL-MCNC: 1.77 MG/DL (ref 0.5–1.4)
CREAT UR-MCNC: 93.16 MG/DL
EOSINOPHIL # BLD AUTO: 0 K/UL (ref 0–0.51)
EOSINOPHIL NFR BLD: 0 % (ref 0–6.9)
ERYTHROCYTE [DISTWIDTH] IN BLOOD BY AUTOMATED COUNT: 47.3 FL (ref 35.9–50)
GLOBULIN SER CALC-MCNC: 2.3 G/DL (ref 1.9–3.5)
GLOBULIN SER CALC-MCNC: 2.5 G/DL (ref 1.9–3.5)
GLUCOSE SERPL-MCNC: 123 MG/DL (ref 65–99)
GLUCOSE SERPL-MCNC: 141 MG/DL (ref 65–99)
HCT VFR BLD AUTO: 41.1 % (ref 42–52)
HGB BLD-MCNC: 13.7 G/DL (ref 14–18)
IMM GRANULOCYTES # BLD AUTO: 0.02 K/UL (ref 0–0.11)
IMM GRANULOCYTES NFR BLD AUTO: 0.2 % (ref 0–0.9)
LACTATE BLD-SCNC: 1.1 MMOL/L (ref 0.5–2)
LACTATE BLD-SCNC: 1.7 MMOL/L (ref 0.5–2)
LACTATE BLD-SCNC: 1.9 MMOL/L (ref 0.5–2)
LYMPHOCYTES # BLD AUTO: 0.41 K/UL (ref 1–4.8)
LYMPHOCYTES NFR BLD: 5.1 % (ref 22–41)
MCH RBC QN AUTO: 30.6 PG (ref 27–33)
MCHC RBC AUTO-ENTMCNC: 33.3 G/DL (ref 33.7–35.3)
MCV RBC AUTO: 91.9 FL (ref 81.4–97.8)
MONOCYTES # BLD AUTO: 0.18 K/UL (ref 0–0.85)
MONOCYTES NFR BLD AUTO: 2.2 % (ref 0–13.4)
NEUTROPHILS # BLD AUTO: 7.47 K/UL (ref 1.82–7.42)
NEUTROPHILS NFR BLD: 92.4 % (ref 44–72)
NRBC # BLD AUTO: 0 K/UL
NRBC BLD-RTO: 0 /100 WBC
NT-PROBNP SERPL IA-MCNC: ABNORMAL PG/ML (ref 0–125)
PLATELET # BLD AUTO: 198 K/UL (ref 164–446)
PMV BLD AUTO: 11.6 FL (ref 9–12.9)
POTASSIUM SERPL-SCNC: 4.4 MMOL/L (ref 3.6–5.5)
POTASSIUM SERPL-SCNC: 4.6 MMOL/L (ref 3.6–5.5)
POTASSIUM UR-SCNC: 53 MMOL/L
PROT SERPL-MCNC: 5.9 G/DL (ref 6–8.2)
PROT SERPL-MCNC: 6.4 G/DL (ref 6–8.2)
PROT UR-MCNC: 32 MG/DL (ref 0–15)
RBC # BLD AUTO: 4.47 M/UL (ref 4.7–6.1)
SODIUM SERPL-SCNC: 139 MMOL/L (ref 135–145)
SODIUM SERPL-SCNC: 140 MMOL/L (ref 135–145)
SODIUM UR-SCNC: 118 MMOL/L
TROPONIN T SERPL-MCNC: 135 NG/L (ref 6–19)
TROPONIN T SERPL-MCNC: 136 NG/L (ref 6–19)
TROPONIN T SERPL-MCNC: 94 NG/L (ref 6–19)
TROPONIN T SERPL-MCNC: 94 NG/L (ref 6–19)
WBC # BLD AUTO: 8.1 K/UL (ref 4.8–10.8)

## 2022-01-24 PROCEDURE — 99223 1ST HOSP IP/OBS HIGH 75: CPT | Mod: AI | Performed by: STUDENT IN AN ORGANIZED HEALTH CARE EDUCATION/TRAINING PROGRAM

## 2022-01-24 PROCEDURE — 83605 ASSAY OF LACTIC ACID: CPT | Mod: 91

## 2022-01-24 PROCEDURE — 80053 COMPREHEN METABOLIC PANEL: CPT | Mod: 91

## 2022-01-24 PROCEDURE — 700105 HCHG RX REV CODE 258: Performed by: HOSPITALIST

## 2022-01-24 PROCEDURE — A9270 NON-COVERED ITEM OR SERVICE: HCPCS | Performed by: STUDENT IN AN ORGANIZED HEALTH CARE EDUCATION/TRAINING PROGRAM

## 2022-01-24 PROCEDURE — 99223 1ST HOSP IP/OBS HIGH 75: CPT | Performed by: INTERNAL MEDICINE

## 2022-01-24 PROCEDURE — 83880 ASSAY OF NATRIURETIC PEPTIDE: CPT

## 2022-01-24 PROCEDURE — 85025 COMPLETE CBC W/AUTO DIFF WBC: CPT

## 2022-01-24 PROCEDURE — 770020 HCHG ROOM/CARE - TELE (206)

## 2022-01-24 PROCEDURE — 700102 HCHG RX REV CODE 250 W/ 637 OVERRIDE(OP): Performed by: STUDENT IN AN ORGANIZED HEALTH CARE EDUCATION/TRAINING PROGRAM

## 2022-01-24 PROCEDURE — 700102 HCHG RX REV CODE 250 W/ 637 OVERRIDE(OP): Performed by: INTERNAL MEDICINE

## 2022-01-24 PROCEDURE — 700111 HCHG RX REV CODE 636 W/ 250 OVERRIDE (IP): Performed by: STUDENT IN AN ORGANIZED HEALTH CARE EDUCATION/TRAINING PROGRAM

## 2022-01-24 PROCEDURE — 700111 HCHG RX REV CODE 636 W/ 250 OVERRIDE (IP): Performed by: INTERNAL MEDICINE

## 2022-01-24 PROCEDURE — A9270 NON-COVERED ITEM OR SERVICE: HCPCS | Performed by: INTERNAL MEDICINE

## 2022-01-24 PROCEDURE — 94760 N-INVAS EAR/PLS OXIMETRY 1: CPT

## 2022-01-24 PROCEDURE — 84484 ASSAY OF TROPONIN QUANT: CPT | Mod: 91

## 2022-01-24 PROCEDURE — 700111 HCHG RX REV CODE 636 W/ 250 OVERRIDE (IP): Performed by: HOSPITALIST

## 2022-01-24 PROCEDURE — 94669 MECHANICAL CHEST WALL OSCILL: CPT

## 2022-01-24 RX ORDER — METHYLPREDNISOLONE SODIUM SUCCINATE 125 MG/2ML
62.5 INJECTION, POWDER, LYOPHILIZED, FOR SOLUTION INTRAMUSCULAR; INTRAVENOUS EVERY 8 HOURS
Status: DISCONTINUED | OUTPATIENT
Start: 2022-01-24 | End: 2022-01-25 | Stop reason: CLARIF

## 2022-01-24 RX ORDER — ATORVASTATIN CALCIUM 40 MG/1
40 TABLET, FILM COATED ORAL DAILY
Status: DISCONTINUED | OUTPATIENT
Start: 2022-01-25 | End: 2022-01-25

## 2022-01-24 RX ORDER — AMOXICILLIN 250 MG
2 CAPSULE ORAL 2 TIMES DAILY
Status: DISCONTINUED | OUTPATIENT
Start: 2022-01-24 | End: 2022-01-25

## 2022-01-24 RX ORDER — HYDROXYZINE HYDROCHLORIDE 25 MG/1
25 TABLET, FILM COATED ORAL 3 TIMES DAILY
Status: DISCONTINUED | OUTPATIENT
Start: 2022-01-25 | End: 2022-01-25

## 2022-01-24 RX ORDER — BISACODYL 10 MG
10 SUPPOSITORY, RECTAL RECTAL
Status: DISCONTINUED | OUTPATIENT
Start: 2022-01-24 | End: 2022-01-25

## 2022-01-24 RX ORDER — AZITHROMYCIN 250 MG/1
500 TABLET, FILM COATED ORAL DAILY
Status: CANCELLED | OUTPATIENT
Start: 2022-01-25 | End: 2022-01-26

## 2022-01-24 RX ORDER — LABETALOL HYDROCHLORIDE 5 MG/ML
10 INJECTION, SOLUTION INTRAVENOUS EVERY 4 HOURS PRN
Status: DISCONTINUED | OUTPATIENT
Start: 2022-01-24 | End: 2022-01-24

## 2022-01-24 RX ORDER — ACETAMINOPHEN 325 MG/1
650 TABLET ORAL EVERY 6 HOURS PRN
Status: CANCELLED | OUTPATIENT
Start: 2022-01-24

## 2022-01-24 RX ORDER — GAUZE BANDAGE 2" X 2"
100 BANDAGE TOPICAL DAILY
Status: CANCELLED | OUTPATIENT
Start: 2022-01-25

## 2022-01-24 RX ORDER — BUDESONIDE AND FORMOTEROL FUMARATE DIHYDRATE 160; 4.5 UG/1; UG/1
2 AEROSOL RESPIRATORY (INHALATION)
Status: DISCONTINUED | OUTPATIENT
Start: 2022-01-24 | End: 2022-01-28 | Stop reason: HOSPADM

## 2022-01-24 RX ORDER — TAMSULOSIN HYDROCHLORIDE 0.4 MG/1
0.4 CAPSULE ORAL EVERY EVENING
Status: DISCONTINUED | OUTPATIENT
Start: 2022-01-24 | End: 2022-01-28 | Stop reason: HOSPADM

## 2022-01-24 RX ORDER — ISOSORBIDE DINITRATE 10 MG/1
20 TABLET ORAL 3 TIMES DAILY
Status: DISCONTINUED | OUTPATIENT
Start: 2022-01-25 | End: 2022-01-25 | Stop reason: CLARIF

## 2022-01-24 RX ORDER — ACETAMINOPHEN 325 MG/1
650 TABLET ORAL EVERY 6 HOURS PRN
Status: DISCONTINUED | OUTPATIENT
Start: 2022-01-24 | End: 2022-01-25

## 2022-01-24 RX ORDER — ISOSORBIDE DINITRATE 10 MG/1
20 TABLET ORAL 3 TIMES DAILY
Status: DISCONTINUED | OUTPATIENT
Start: 2022-01-24 | End: 2022-01-24 | Stop reason: HOSPADM

## 2022-01-24 RX ORDER — CLONIDINE HYDROCHLORIDE 0.1 MG/1
0.1 TABLET ORAL EVERY 6 HOURS PRN
Status: DISCONTINUED | OUTPATIENT
Start: 2022-01-24 | End: 2022-01-25

## 2022-01-24 RX ORDER — HYDRALAZINE HYDROCHLORIDE 25 MG/1
25 TABLET, FILM COATED ORAL EVERY 8 HOURS
Status: DISCONTINUED | OUTPATIENT
Start: 2022-01-24 | End: 2022-01-24 | Stop reason: HOSPADM

## 2022-01-24 RX ORDER — AMOXICILLIN 250 MG
2 CAPSULE ORAL 2 TIMES DAILY
Status: CANCELLED | OUTPATIENT
Start: 2022-01-24

## 2022-01-24 RX ORDER — POLYETHYLENE GLYCOL 3350 17 G/17G
1 POWDER, FOR SOLUTION ORAL
Status: CANCELLED | OUTPATIENT
Start: 2022-01-24

## 2022-01-24 RX ORDER — HEPARIN SODIUM 5000 [USP'U]/ML
5000 INJECTION, SOLUTION INTRAVENOUS; SUBCUTANEOUS EVERY 8 HOURS
Status: CANCELLED | OUTPATIENT
Start: 2022-01-24

## 2022-01-24 RX ORDER — NICOTINE 21 MG/24HR
14 PATCH, TRANSDERMAL 24 HOURS TRANSDERMAL
Status: DISCONTINUED | OUTPATIENT
Start: 2022-01-25 | End: 2022-01-28 | Stop reason: HOSPADM

## 2022-01-24 RX ORDER — IPRATROPIUM BROMIDE AND ALBUTEROL SULFATE 2.5; .5 MG/3ML; MG/3ML
3 SOLUTION RESPIRATORY (INHALATION)
Status: DISCONTINUED | OUTPATIENT
Start: 2022-01-24 | End: 2022-01-26

## 2022-01-24 RX ORDER — GUAIFENESIN/DEXTROMETHORPHAN 100-10MG/5
10 SYRUP ORAL EVERY 6 HOURS PRN
Status: CANCELLED | OUTPATIENT
Start: 2022-01-24

## 2022-01-24 RX ORDER — HYDRALAZINE HYDROCHLORIDE 25 MG/1
25 TABLET, FILM COATED ORAL EVERY 8 HOURS
Status: CANCELLED | OUTPATIENT
Start: 2022-01-24

## 2022-01-24 RX ORDER — METHYLPREDNISOLONE SODIUM SUCCINATE 125 MG/2ML
125 INJECTION, POWDER, LYOPHILIZED, FOR SOLUTION INTRAMUSCULAR; INTRAVENOUS EVERY 8 HOURS
Status: CANCELLED | OUTPATIENT
Start: 2022-01-24 | End: 2022-01-26

## 2022-01-24 RX ORDER — POLYETHYLENE GLYCOL 3350 17 G/17G
1 POWDER, FOR SOLUTION ORAL
Status: DISCONTINUED | OUTPATIENT
Start: 2022-01-24 | End: 2022-01-25

## 2022-01-24 RX ORDER — AZITHROMYCIN 250 MG/1
500 TABLET, FILM COATED ORAL DAILY
Status: DISCONTINUED | OUTPATIENT
Start: 2022-01-25 | End: 2022-01-25

## 2022-01-24 RX ORDER — HYDRALAZINE HYDROCHLORIDE 20 MG/ML
10 INJECTION INTRAMUSCULAR; INTRAVENOUS EVERY 4 HOURS PRN
Status: CANCELLED | OUTPATIENT
Start: 2022-01-24

## 2022-01-24 RX ORDER — ONDANSETRON 2 MG/ML
4 INJECTION INTRAMUSCULAR; INTRAVENOUS EVERY 4 HOURS PRN
Status: CANCELLED | OUTPATIENT
Start: 2022-01-24

## 2022-01-24 RX ORDER — ISOSORBIDE DINITRATE 10 MG/1
10 TABLET ORAL 3 TIMES DAILY
Status: DISCONTINUED | OUTPATIENT
Start: 2022-01-24 | End: 2022-01-24

## 2022-01-24 RX ORDER — BISACODYL 10 MG
10 SUPPOSITORY, RECTAL RECTAL
Status: CANCELLED | OUTPATIENT
Start: 2022-01-24

## 2022-01-24 RX ORDER — ISOSORBIDE DINITRATE 10 MG/1
20 TABLET ORAL 3 TIMES DAILY
Status: CANCELLED | OUTPATIENT
Start: 2022-01-24

## 2022-01-24 RX ORDER — ONDANSETRON 4 MG/1
4 TABLET, ORALLY DISINTEGRATING ORAL EVERY 4 HOURS PRN
Status: CANCELLED | OUTPATIENT
Start: 2022-01-24

## 2022-01-24 RX ORDER — ATORVASTATIN CALCIUM 40 MG/1
40 TABLET, FILM COATED ORAL DAILY
Status: CANCELLED | OUTPATIENT
Start: 2022-01-25

## 2022-01-24 RX ORDER — TAMSULOSIN HYDROCHLORIDE 0.4 MG/1
0.4 CAPSULE ORAL EVERY EVENING
Status: CANCELLED | OUTPATIENT
Start: 2022-01-24

## 2022-01-24 RX ORDER — HYDRALAZINE HYDROCHLORIDE 10 MG/1
10 TABLET, FILM COATED ORAL EVERY 8 HOURS
Status: DISCONTINUED | OUTPATIENT
Start: 2022-01-24 | End: 2022-01-24

## 2022-01-24 RX ORDER — LORAZEPAM 0.5 MG/1
0.5 TABLET ORAL EVERY 4 HOURS PRN
Status: DISCONTINUED | OUTPATIENT
Start: 2022-01-24 | End: 2022-01-25 | Stop reason: CLARIF

## 2022-01-24 RX ADMIN — TAMSULOSIN HYDROCHLORIDE 0.4 MG: 0.4 CAPSULE ORAL at 16:56

## 2022-01-24 RX ADMIN — HYDRALAZINE HYDROCHLORIDE 10 MG: 10 TABLET, FILM COATED ORAL at 15:23

## 2022-01-24 RX ADMIN — SENNOSIDES AND DOCUSATE SODIUM 2 TABLET: 50; 8.6 TABLET ORAL at 06:03

## 2022-01-24 RX ADMIN — DOBUTAMINE HYDROCHLORIDE 2.5 MCG/KG/MIN: 100 INJECTION INTRAVENOUS at 19:08

## 2022-01-24 RX ADMIN — GUAIFENESIN SYRUP AND DEXTROMETHORPHAN 10 ML: 100; 10 SYRUP ORAL at 20:58

## 2022-01-24 RX ADMIN — ISOSORBIDE DINITRATE 20 MG: 10 TABLET ORAL at 19:00

## 2022-01-24 RX ADMIN — ACETAMINOPHEN 650 MG: 325 TABLET, FILM COATED ORAL at 20:58

## 2022-01-24 RX ADMIN — BUDESONIDE AND FORMOTEROL FUMARATE DIHYDRATE 2 PUFF: 160; 4.5 AEROSOL RESPIRATORY (INHALATION) at 23:51

## 2022-01-24 RX ADMIN — METHYLPREDNISOLONE SODIUM SUCCINATE 125 MG: 125 INJECTION, POWDER, FOR SOLUTION INTRAMUSCULAR; INTRAVENOUS at 15:23

## 2022-01-24 RX ADMIN — SODIUM CHLORIDE 3 G: 900 INJECTION INTRAVENOUS at 16:57

## 2022-01-24 RX ADMIN — HEPARIN SODIUM 5000 UNITS: 5000 INJECTION, SOLUTION INTRAVENOUS; SUBCUTANEOUS at 15:22

## 2022-01-24 RX ADMIN — THIAMINE HCL TAB 100 MG 100 MG: 100 TAB at 06:04

## 2022-01-24 RX ADMIN — TAMSULOSIN HYDROCHLORIDE 0.4 MG: 0.4 CAPSULE ORAL at 23:50

## 2022-01-24 RX ADMIN — HEPARIN SODIUM 5000 UNITS: 5000 INJECTION, SOLUTION INTRAVENOUS; SUBCUTANEOUS at 06:04

## 2022-01-24 RX ADMIN — ATORVASTATIN CALCIUM 40 MG: 40 TABLET, FILM COATED ORAL at 06:04

## 2022-01-24 RX ADMIN — SODIUM CHLORIDE 3 G: 900 INJECTION INTRAVENOUS at 06:33

## 2022-01-24 RX ADMIN — ISOSORBIDE DINITRATE 10 MG: 10 TABLET ORAL at 12:27

## 2022-01-24 RX ADMIN — HYDRALAZINE HYDROCHLORIDE 25 MG: 25 TABLET, FILM COATED ORAL at 19:00

## 2022-01-24 RX ADMIN — ASPIRIN 81 MG: 81 TABLET, COATED ORAL at 16:57

## 2022-01-24 RX ADMIN — ISOSORBIDE DINITRATE 10 MG: 10 TABLET ORAL at 16:58

## 2022-01-24 RX ADMIN — AZITHROMYCIN MONOHYDRATE 500 MG: 250 TABLET ORAL at 06:04

## 2022-01-24 RX ADMIN — HYDRALAZINE HYDROCHLORIDE 10 MG: 20 INJECTION INTRAMUSCULAR; INTRAVENOUS at 16:57

## 2022-01-24 RX ADMIN — HYDRALAZINE HYDROCHLORIDE 10 MG: 10 TABLET, FILM COATED ORAL at 10:34

## 2022-01-24 RX ADMIN — METHYLPREDNISOLONE SODIUM SUCCINATE 125 MG: 125 INJECTION, POWDER, FOR SOLUTION INTRAMUSCULAR; INTRAVENOUS at 06:05

## 2022-01-24 ASSESSMENT — ENCOUNTER SYMPTOMS
CHILLS: 0
SORE THROAT: 0
HEADACHES: 0
VOMITING: 0
NAUSEA: 0
HEADACHES: 0
VOMITING: 0
FEVER: 0
DIARRHEA: 0
DIZZINESS: 0
SHORTNESS OF BREATH: 1
MYALGIAS: 0
CHILLS: 0
FEVER: 0
DEPRESSION: 0
ABDOMINAL PAIN: 0
NAUSEA: 0
COUGH: 1
SHORTNESS OF BREATH: 1
DIZZINESS: 0
ABDOMINAL PAIN: 0
COUGH: 1

## 2022-01-24 ASSESSMENT — LIFESTYLE VARIABLES
CONSUMPTION TOTAL: NEGATIVE
TOTAL SCORE: 0
HAVE YOU EVER FELT YOU SHOULD CUT DOWN ON YOUR DRINKING: NO
TOTAL SCORE: 0
ALCOHOL_USE: YES
HOW MANY TIMES IN THE PAST YEAR HAVE YOU HAD 5 OR MORE DRINKS IN A DAY: 0
TOTAL SCORE: 0
HAVE PEOPLE ANNOYED YOU BY CRITICIZING YOUR DRINKING: NO
ON A TYPICAL DAY WHEN YOU DRINK ALCOHOL HOW MANY DRINKS DO YOU HAVE: 2
AVERAGE NUMBER OF DAYS PER WEEK YOU HAVE A DRINK CONTAINING ALCOHOL: 2
EVER FELT BAD OR GUILTY ABOUT YOUR DRINKING: NO
DOES PATIENT WANT TO STOP DRINKING: NO
EVER HAD A DRINK FIRST THING IN THE MORNING TO STEADY YOUR NERVES TO GET RID OF A HANGOVER: NO

## 2022-01-24 ASSESSMENT — FIBROSIS 4 INDEX
FIB4 SCORE: 3.58
FIB4 SCORE: 2.75

## 2022-01-24 NOTE — DISCHARGE PLANNING
Anticipated Discharge Disposition: Transfer to [facility]      Action: CM RN received notification that pt will be transferring to [facility]. Updated pt, Cobra signed by pt and MD. Spoke with pt about updating emergency contact, Gabrielle, no answer. Gabrielle does not have a voicemail to leave messages. No bed assignment at this time. PCS form faxed to transfer center.     Barriers to Discharge: Bed at [facility]     Plan: Case management to follow up with bed at [facility] and complete cobra with updated information about bed and contact number for report

## 2022-01-24 NOTE — PROGRESS NOTES
Report received from Lupe DEL ANGEL. Plan of care discussed. Patient resting comfortably in bed. Safety precautions in place.

## 2022-01-24 NOTE — PROGRESS NOTES
[facility] TRIAGE OFFICER DIRECT ADMISSION REPORT  Transferring facility: [facility]  Transferring physician: Dr Starr  Transferring facility/physician contact number: [phone]  Chief complaint: dyspnea  Pertinent history & patient course:  Patient is a 81 y M with hx of CAD, HTN, BPH, who presents for abrupt onset of dyspnea for past two weeks. He was found to have acute hypoxic respiratory failure on 4L oxygen, as well as sepsis due to pneumonia and urinary tract infection, as well as acute kidney injury and COPD exacerbation. Serial troponins elevated in 90's. Patient receiving antibiotics and steroids. Echocardiogram shows 20% ejection fraction with grade 2 diastolic dysfunction and severe mitral regurgitation. Cardiology consulted who request transfer to [facility] for left heart catheterization.  Pertinent imaging & lab results: as above    Code Status: DNR/DNI per transferring provider, I personally verified with the transferring provider patient's code status and the transferring provider has confirmed this with the patient.    Further work up or recommendations per triage officer prior to transfer: none  Consultants called prior to transfer and pertinent input from consultants: cardiology following    Patient accepted for transfer: Yes  Consultants to be called upon arrival: cardiology team, Dr Crowley following at [facility] 1/24  Admission status: Inpatient.   Floor requested: telemetry  If ICU transfer, name of intensivist case discussed with and pertinent input from critical care: n/a    Please inform the triage officer upon arrival of the patient to [facility] for assignment of a hospitalist to perform admission.     For any question or concerns regarding the care of this patient, please reach out to the assigned hospitalist.

## 2022-01-24 NOTE — DISCHARGE SUMMARY
Discharge Summary    CHIEF COMPLAINT ON ADMISSION  Chief Complaint   Patient presents with   • Shortness of Breath     started approx 0400   • Cough     for the last 2wks       Reason for Admission  EMS     CODE STATUS  DNAR/DNI    HPI & HOSPITAL COURSE  This is a 81 y.o. male here with dyspnea.    81-year-old male  with a past medical history of tobacco use, dyslipidemia on atorvastatin, CAD on ASA, BPH on tamsulosin hypertension on lisinopril and amlodipine, and [facility] for acute hypoxic respiratory failure requiring presents emergency department via EMS for sudden onset worsening dyspnea.  In the ER patient tachycardic, tachypneic and hypoxic requiring 4 L nasal cannula.  Patient found to have LUIS FELIPE and sepsis started on IV antibiotics.  Also noted to be in COPD exacerbation secondary to pneumonia in the bilateral lower lobes started on steroids and SVNs.  UA also positive for UTI, patient has a history of straight cathing himself around 2-3 times a day.  Renal ultrasound showed indeterminate 2.5 cm calcified mass in the upper pole the right kidney recommend further evaluation with CT with contrast, will need to wait for renal improvement.  Echocardiogram showed severely reduced EF 20%, grade 2 diastolic dysfunction, and severe mitral regurgitation that is functional related to restricted movement of the posterior leaflet.  Cardiology was consulted, discussed with Dr. Stovall who recommends left heart cath and transfer to [facility].     Therefore, he is discharged in guarded and stable condition to a critical access hospital.    The patient recovered much more quickly than anticipated on admission.      FOLLOW UP ITEMS POST DISCHARGE  TBD    DISCHARGE DIAGNOSES  Principal Problem:    Sepsis (HCC) POA: Yes  Active Problems:    Leukocytosis POA: Yes    Acute respiratory failure with hypoxia (HCC) POA: Yes    Acute renal failure (ARF) (HCC) POA: Yes    COPD exacerbation (HCC) POA: Yes     Lactic acidosis POA: Yes    Primary hypertension POA: Yes    Pulmonary infiltrates POA: Yes    Dyslipidemia POA: Yes    Elevated troponin POA: Yes    BPH (benign prostatic hyperplasia) POA: Yes  Resolved Problems:    * No resolved hospital problems. *      FOLLOW UP  No future appointments.  No follow-up provider specified.    MEDICATIONS ON DISCHARGE     Medication List      ASK your doctor about these medications      Instructions   aspirin EC 81 MG Tbec  Commonly known as: ECOTRIN   Take 81 mg by mouth every evening.  Dose: 81 mg     atorvastatin 40 MG Tabs  Commonly known as: LIPITOR   Take 40 mg by mouth every day.  Dose: 40 mg     lisinopril 20 MG Tabs  Commonly known as: PRINIVIL   Take 20 mg by mouth 2 times a day.  Dose: 20 mg     tamsulosin 0.4 MG capsule  Commonly known as: FLOMAX   Take 0.4 mg by mouth every evening.  Dose: 0.4 mg     VITAMIN C PO   Take 2 Tablets by mouth every evening.  Dose: 2 Tablet            Allergies  No Known Allergies    DIET  Orders Placed This Encounter   Procedures   • Diet Order Diet: Cardiac; Fluid modifications: (optional): 1500 ml Fluid Restriction     Standing Status:   Standing     Number of Occurrences:   1     Order Specific Question:   Diet:     Answer:   Cardiac [6]     Order Specific Question:   Fluid modifications: (optional)     Answer:   1500 ml Fluid Restriction [9]       ACTIVITY  As tolerated.  Weight bearing as tolerated    LINES, DRAINS, AND WOUNDS  This is an automated list. Peripheral IVs will be removed prior to discharge.  Peripheral IV 01/23/22 18 G Left Antecubital (Active)   Site Assessment Clean;Dry;Intact 01/24/22 0000   Dressing Type Transparent Film 01/24/22 0000   Line Status Saline locked;Flushed 01/24/22 0000   Dressing Status Clean;Dry;Intact 01/24/22 0000   Dressing Intervention N/A 01/23/22 2000       Peripheral IV 01/23/22 20 G Anterior;Distal;Right Upper arm (Active)   Site Assessment Clean;Dry;Intact 01/24/22 0000   Dressing Type  Transparent 01/24/22 0000   Line Status Flushed;Saline locked 01/24/22 0000   Dressing Status Clean;Dry;Intact 01/24/22 0000   Dressing Intervention N/A 01/23/22 2000          Peripheral IV 01/23/22 18 G Left Antecubital (Active)   Site Assessment Clean;Dry;Intact 01/24/22 0000   Dressing Type Transparent Film 01/24/22 0000   Line Status Saline locked;Flushed 01/24/22 0000   Dressing Status Clean;Dry;Intact 01/24/22 0000   Dressing Intervention N/A 01/23/22 2000       Peripheral IV 01/23/22 20 G Anterior;Distal;Right Upper arm (Active)   Site Assessment Clean;Dry;Intact 01/24/22 0000   Dressing Type Transparent 01/24/22 0000   Line Status Flushed;Saline locked 01/24/22 0000   Dressing Status Clean;Dry;Intact 01/24/22 0000   Dressing Intervention N/A 01/23/22 2000               MENTAL STATUS ON TRANSFER             CONSULTATIONS  Cardiology     PROCEDURES  N/A    LABORATORY  Lab Results   Component Value Date    SODIUM 139 01/24/2022    POTASSIUM 4.4 01/24/2022    CHLORIDE 106 01/24/2022    CO2 18 (L) 01/24/2022    GLUCOSE 141 (H) 01/24/2022    BUN 30 (H) 01/24/2022    CREATININE 1.54 (H) 01/24/2022    CREATININE 1.2 03/28/2009        Lab Results   Component Value Date    WBC 8.1 01/24/2022    HEMOGLOBIN 13.7 (L) 01/24/2022    HEMATOCRIT 41.1 (L) 01/24/2022    PLATELETCT 198 01/24/2022        Total time of the discharge process exceeds 38 minutes.

## 2022-01-24 NOTE — CONSULTS
Reason for Consult:  Asked by KALEY Victor.OBrad to see this patient with heart failure    CC:   Chief Complaint   Patient presents with   • Shortness of Breath     started approx 0400   • Cough     for the last 2wks       HPI:     81 year old man with PMH active tobacco 60+ pack years, BPH, HTN, HLD, ?CAD presents with acute progressives respiratory failure including orthopnea and pnd as well as increased dyspnea with exertion. Describes cough as well which is mostly nonproductive. Denies fevers and chills. He consumes about two alcohol drinks daily. No illicit drugs. He resides in [place]. 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 Echo findings severe systolic heart failure with estimated low flow as well as reduced tissue perfusion with elevated lactic acid since arrival and acute kidney injury. Cardiology Dr Jones was consulted but patient was not seen. Patient currently with ongoing shortness of breath.    Medications / Drug list prior to admission:  No current facility-administered medications on file prior to encounter.     Current Outpatient Medications on File Prior to Encounter   Medication Sig Dispense Refill   • tamsulosin (FLOMAX) 0.4 MG capsule Take 0.4 mg by mouth every evening.     • aspirin EC (ECOTRIN) 81 MG Tablet Delayed Response Take 81 mg by mouth every evening.     • Ascorbic Acid (VITAMIN C PO) Take 2 Tablets by mouth every evening.     • lisinopril (PRINIVIL) 20 MG Tab Take 20 mg by mouth 2 times a day.     • atorvastatin (LIPITOR) 40 MG Tab Take 40 mg by mouth every day.         Current list of administered Medications:    Current Facility-Administered Medications:   •  ampicillin/sulbactam (UNASYN) 3 g in  mL IVPB, 3 g, Intravenous, Q12HRS, Cate Castandea M.D., Stopped at 01/24/22 0703  •  hydrALAZINE (APRESOLINE) tablet 10 mg, 10 mg, Oral, Q8HRS, Ike Crowley M.D.  •  Respiratory Therapy Consult, , Nebulization,  Continuous RT, Patrick Osuna M.D.  •  [Held by provider] ipratropium-albuterol (DUONEB) nebulizer solution, 3 mL, Nebulization, Q4HRS (RT), Patrick Osuna M.D.  •  [Held by provider] ipratropium-albuterol (DUONEB) nebulizer solution, 3 mL, Nebulization, Q2HRS PRN (RT), Patrick Osuna M.D.  •  senna-docusate (PERICOLACE or SENOKOT S) 8.6-50 MG per tablet 2 Tablet, 2 Tablet, Oral, BID, 2 Tablet at 01/24/22 0603 **AND** polyethylene glycol/lytes (MIRALAX) PACKET 1 Packet, 1 Packet, Oral, QDAY PRN **AND** magnesium hydroxide (MILK OF MAGNESIA) suspension 30 mL, 30 mL, Oral, QDAY PRN **AND** bisacodyl (DULCOLAX) suppository 10 mg, 10 mg, Rectal, QDAY PRN, Patrick Osuna M.D.  •  heparin injection 5,000 Units, 5,000 Units, Subcutaneous, Q8HRS, Patrick Osuna M.D., 5,000 Units at 01/24/22 0604  •  acetaminophen (Tylenol) tablet 650 mg, 650 mg, Oral, Q6HRS PRN, Patrick Osuna M.D.  •  azithromycin (ZITHROMAX) tablet 500 mg, 500 mg, Oral, DAILY, Patrick Osuna M.D., 500 mg at 01/24/22 0604  •  hydrALAZINE (APRESOLINE) injection 10 mg, 10 mg, Intravenous, Q4HRS PRN, Patrick Osuna M.D.  •  ondansetron (ZOFRAN) syringe/vial injection 4 mg, 4 mg, Intravenous, Q4HRS PRN, Patrick Osnua M.D.  •  ondansetron (ZOFRAN ODT) dispertab 4 mg, 4 mg, Oral, Q4HRS PRN, Patrick Osuna M.D.  •  guaiFENesin dextromethorphan (ROBITUSSIN DM) 100-10 MG/5ML syrup 10 mL, 10 mL, Oral, Q6HRS PRN, Patrick Osuna M.D.  •  methylPREDNISolone sod succ (SOLU-MEDROL) 125 MG injection 125 mg, 125 mg, Intravenous, Q8HRS, Patrick Osuna M.D., 125 mg at 01/24/22 0605  •  aspirin EC (ECOTRIN) tablet 81 mg, 81 mg, Oral, Q EVENING, Patrick Osuna M.D., 81 mg at 01/23/22 1803  •  tamsulosin (FLOMAX) capsule 0.4 mg, 0.4 mg, Oral, Q EVENING, Patrick Osuna M.D., 0.4 mg at 01/23/22 1803  •  atorvastatin (LIPITOR)  tablet 40 mg, 40 mg, Oral, DAILY, Patrick Osuna M.D., 40 mg at 01/24/22 0604  •  thiamine (Vitamin B-1) tablet 100 mg, 100 mg, Oral, DAILY, Patrick Osuna M.D., 100 mg at 01/24/22 0604  •  influenza Vac High-Dose Quad (Fluzone) injection 0.7 mL, 0.7 mL, Intramuscular, Once PRN, Patrick Osuna M.D.  •  [Held by provider] spironolactone (ALDACTONE) tablet 12.5 mg, 12.5 mg, Oral, Q DAY, Patrick Osuna M.D.  •  [Held by provider] lisinopril (PRINIVIL) tablet 2.5 mg, 2.5 mg, Oral, Q DAY, Patrick Osuna M.D.    Past Medical History:   Diagnosis Date   • Benign tumor of brain (HCC)        Past Surgical History:   Procedure Laterality Date   • OTHER ORTHOPEDIC SURGERY         No family history on file.  Patient family history was personally reviewed, no pertinent family history to current presentation    Social History     Socioeconomic History   • Marital status:      Spouse name: Not on file   • Number of children: Not on file   • Years of education: Not on file   • Highest education level: Not on file   Occupational History   • Not on file   Tobacco Use   • Smoking status: Current Every Day Smoker   • Smokeless tobacco: Not on file   Substance and Sexual Activity   • Alcohol use: Yes   • Drug use: No   • Sexual activity: Not on file   Other Topics Concern   • Not on file   Social History Narrative   • Not on file     Social Determinants of Health     Financial Resource Strain:    • Difficulty of Paying Living Expenses: Not on file   Food Insecurity:    • Worried About Running Out of Food in the Last Year: Not on file   • Ran Out of Food in the Last Year: Not on file   Transportation Needs:    • Lack of Transportation (Medical): Not on file   • Lack of Transportation (Non-Medical): Not on file   Physical Activity:    • Days of Exercise per Week: Not on file   • Minutes of Exercise per Session: Not on file   Stress:    • Feeling of Stress : Not on file  "  Social Connections:    • Frequency of Communication with Friends and Family: Not on file   • Frequency of Social Gatherings with Friends and Family: Not on file   • Attends Orthodox Services: Not on file   • Active Member of Clubs or Organizations: Not on file   • Attends Club or Organization Meetings: Not on file   • Marital Status: Not on file   Intimate Partner Violence:    • Fear of Current or Ex-Partner: Not on file   • Emotionally Abused: Not on file   • Physically Abused: Not on file   • Sexually Abused: Not on file   Housing Stability:    • Unable to Pay for Housing in the Last Year: Not on file   • Number of Places Lived in the Last Year: Not on file   • Unstable Housing in the Last Year: Not on file       ALLERGIES:  No Known Allergies    Review of systems:  A complete review of symptoms was reviewed with patient. This is reviewed in H&P and PMH. ALL OTHERS reviewed and negative    Physical exam:  Patient Vitals for the past 24 hrs:   BP Temp Temp src Pulse Resp SpO2 Height Weight   01/24/22 0635 -- -- -- (!) 106 -- 93 % -- --   01/24/22 0426 132/89 36.6 °C (97.8 °F) Oral (!) 110 18 90 % -- 47.1 kg (103 lb 12.8 oz)   01/24/22 0056 126/70 -- -- (!) 102 16 92 % -- --   01/23/22 1939 152/93 36.6 °C (97.8 °F) Oral (!) 104 18 92 % -- --   01/23/22 1512 -- -- -- -- -- -- 1.803 m (5' 11\") 58.8 kg (129 lb 9.6 oz)   01/23/22 1453 -- -- -- 98 16 97 % -- --   01/23/22 1421 (!) 167/89 36.3 °C (97.3 °F) Oral (!) 104 (!) 22 97 % 1.829 m (6') 57.6 kg (127 lb)   01/23/22 1348 -- -- -- (!) 110 19 92 % -- --   01/23/22 1337 141/90 -- -- (!) 101 (!) 22 94 % -- --   01/23/22 1236 149/89 -- -- (!) 108 (!) 28 93 % -- --   01/23/22 1227 -- -- -- (!) 105 (!) 21 96 % -- --   01/23/22 1154 -- -- -- (!) 114 18 95 % -- --   01/23/22 1136 (!) 163/93 -- -- (!) 110 20 93 % -- --   01/23/22 1113 -- -- -- (!) 115 15 92 % -- --   01/23/22 1036 145/93 -- -- (!) 120 (!) 21 91 % -- --   01/23/22 1015 -- -- -- (!) 110 18 92 % -- -- "   01/23/22 1011 -- -- -- (!) 109 (!) 22 95 % -- --   01/23/22 1010 -- -- -- (!) 104 (!) 22 88 % -- --   01/23/22 0937 148/78 -- -- -- (!) 22 (!) 86 % -- --   01/23/22 0849 -- -- -- (!) 118 (!) 46 96 % -- --   01/23/22 0848 -- -- -- (!) 118 (!) 24 96 % -- --   01/23/22 0847 -- -- -- (!) 115 (!) 51 96 % -- --   01/23/22 0843 -- -- -- (!) 121 -- 93 % -- --   01/23/22 0840 -- -- -- -- -- 92 % -- --   01/23/22 0837 (!) 181/104 -- -- -- (!) 34 -- -- --   01/23/22 0803 -- -- -- (!) 118 (!) 22 90 % -- --   01/23/22 0802 -- -- -- (!) 121 (!) 21 91 % -- --     General: No acute distress.   EYES: no jaundice  HEENT: OP clear   Neck: No bruits No JVD.   CVS:  RRR. S1 + S2. No M/R/G. No edema.  Resp: CTAB. No wheezing or crackles/rhonchi.  Abdomen: Soft, NT, ND,  Skin: Grossly nothing acute no obvious rashes  Neurological: Alert, Moves all extremities, no cranial nerve defects on limited exam  Extremities: Pulse 2+ in b/l LE. No cyanosis.     Data:  Laboratory studies personally reviewed by me:  Recent Results (from the past 24 hour(s))   LACTIC ACID    Collection Time: 01/23/22  9:08 AM   Result Value Ref Range    Lactic Acid 2.5 (H) 0.5 - 2.0 mmol/L   MAGNESIUM    Collection Time: 01/23/22  9:08 AM   Result Value Ref Range    Magnesium 2.2 1.5 - 2.5 mg/dL   TROPONIN    Collection Time: 01/23/22  9:08 AM   Result Value Ref Range    Troponin T 115 (H) 6 - 19 ng/L   PHOSPHORUS    Collection Time: 01/23/22  9:08 AM   Result Value Ref Range    Phosphorus 3.4 2.5 - 4.5 mg/dL   EC-ECHOCARDIOGRAM COMPLETE W/ CONT    Collection Time: 01/23/22 10:41 AM   Result Value Ref Range    Eject.Frac. MOD BP 23.34     Eject.Frac. MOD 4C 20.97     Eject.Frac. MOD 2C 26.62     Left Ventrical Ejection Fraction 20    Lactic Acid Every four hours after STAT order    Collection Time: 01/23/22  1:55 PM   Result Value Ref Range    Lactic Acid 3.1 (H) 0.5 - 2.0 mmol/L   TROPONIN    Collection Time: 01/23/22  1:55 PM   Result Value Ref Range    Troponin T  114 (H) 6 - 19 ng/L   URINE DRUG SCREEN    Collection Time: 01/23/22  5:05 PM   Result Value Ref Range    Amphetamines Urine Negative Negative    Barbiturates Negative Negative    Benzodiazepines Negative Negative    Cocaine Metabolite Negative Negative    Methadone Negative Negative    Opiates Negative Negative    Oxycodone Negative Negative    Phencyclidine -Pcp Negative Negative    Propoxyphene Negative Negative    Cannabinoid Metab Negative Negative   URINALYSIS    Collection Time: 01/23/22  5:06 PM    Specimen: Urine   Result Value Ref Range    Color Yellow     Character Clear     Specific Gravity 1.025 <1.035    Ph 5.5 5.0 - 8.0    Glucose Negative Negative mg/dL    Ketones Negative Negative mg/dL    Protein Negative Negative mg/dL    Bilirubin Negative Negative    Nitrite Positive (A) Negative    Leukocyte Esterase Negative Negative    Occult Blood Negative Negative    Micro Urine Req Microscopic    URINE MICROSCOPIC (W/UA)    Collection Time: 01/23/22  5:06 PM   Result Value Ref Range    WBC 20-50 (A) /hpf    RBC 0-2 (A) /hpf    Bacteria Many (A) None /hpf    Epithelial Cells Negative Few /hpf    Mucous Threads Rare /hpf    Hyaline Cast 0-2 /lpf   Lactic Acid Every four hours after STAT order    Collection Time: 01/23/22  6:36 PM   Result Value Ref Range    Lactic Acid 3.0 (H) 0.5 - 2.0 mmol/L   TROPONIN    Collection Time: 01/23/22  6:36 PM   Result Value Ref Range    Troponin T 96 (H) 6 - 19 ng/L   Lactic Acid Every four hours after STAT order    Collection Time: 01/23/22 10:36 PM   Result Value Ref Range    Lactic Acid 2.4 (H) 0.5 - 2.0 mmol/L   TROPONIN    Collection Time: 01/23/22 10:36 PM   Result Value Ref Range    Troponin T 90 (H) 6 - 19 ng/L   CBC with Differential    Collection Time: 01/24/22  2:18 AM   Result Value Ref Range    WBC 8.1 4.8 - 10.8 K/uL    RBC 4.47 (L) 4.70 - 6.10 M/uL    Hemoglobin 13.7 (L) 14.0 - 18.0 g/dL    Hematocrit 41.1 (L) 42.0 - 52.0 %    MCV 91.9 81.4 - 97.8 fL    MCH  30.6 27.0 - 33.0 pg    MCHC 33.3 (L) 33.7 - 35.3 g/dL    RDW 47.3 35.9 - 50.0 fL    Platelet Count 198 164 - 446 K/uL    MPV 11.6 9.0 - 12.9 fL    Neutrophils-Polys 92.40 (H) 44.00 - 72.00 %    Lymphocytes 5.10 (L) 22.00 - 41.00 %    Monocytes 2.20 0.00 - 13.40 %    Eosinophils 0.00 0.00 - 6.90 %    Basophils 0.10 0.00 - 1.80 %    Immature Granulocytes 0.20 0.00 - 0.90 %    Nucleated RBC 0.00 /100 WBC    Neutrophils (Absolute) 7.47 (H) 1.82 - 7.42 K/uL    Lymphs (Absolute) 0.41 (L) 1.00 - 4.80 K/uL    Monos (Absolute) 0.18 0.00 - 0.85 K/uL    Eos (Absolute) 0.00 0.00 - 0.51 K/uL    Baso (Absolute) 0.01 0.00 - 0.12 K/uL    Immature Granulocytes (abs) 0.02 0.00 - 0.11 K/uL    NRBC (Absolute) 0.00 K/uL   Comp Metabolic Panel (CMP)    Collection Time: 01/24/22  2:18 AM   Result Value Ref Range    Sodium 139 135 - 145 mmol/L    Potassium 4.4 3.6 - 5.5 mmol/L    Chloride 106 96 - 112 mmol/L    Co2 18 (L) 20 - 33 mmol/L    Anion Gap 15.0 7.0 - 16.0    Glucose 141 (H) 65 - 99 mg/dL    Bun 30 (H) 8 - 22 mg/dL    Creatinine 1.54 (H) 0.50 - 1.40 mg/dL    Calcium 8.9 8.4 - 10.2 mg/dL    AST(SGOT) 19 12 - 45 U/L    ALT(SGPT) 8 2 - 50 U/L    Alkaline Phosphatase 104 (H) 30 - 99 U/L    Total Bilirubin 0.4 0.1 - 1.5 mg/dL    Albumin 3.9 3.2 - 4.9 g/dL    Total Protein 6.4 6.0 - 8.2 g/dL    Globulin 2.5 1.9 - 3.5 g/dL    A-G Ratio 1.6 g/dL   LACTIC ACID    Collection Time: 01/24/22  2:18 AM   Result Value Ref Range    Lactic Acid 1.7 0.5 - 2.0 mmol/L   TROPONIN    Collection Time: 01/24/22  2:18 AM   Result Value Ref Range    Troponin T 94 (H) 6 - 19 ng/L   ESTIMATED GFR    Collection Time: 01/24/22  2:18 AM   Result Value Ref Range    GFR If  53 (A) >60 mL/min/1.73 m 2    GFR If Non  44 (A) >60 mL/min/1.73 m 2       EKG 1/23/22 interpreted by me sinus tach, ventricular couplets, incomplete LBBB, abnormal ST depressions suggestive ischemia    All pertinent features of laboratory and imaging reviewed  including primary images where applicable      Principal Problem:    Sepsis (HCC) POA: Yes  Active Problems:    Leukocytosis POA: Yes    Acute respiratory failure with hypoxia (HCC) POA: Yes    Acute renal failure (ARF) (HCC) POA: Yes    COPD exacerbation (HCC) POA: Yes    Lactic acidosis POA: Yes    Primary hypertension POA: Yes    Pulmonary infiltrates POA: Yes    Dyslipidemia POA: Yes    Elevated troponin POA: Yes    BPH (benign prostatic hyperplasia) POA: Yes  Resolved Problems:    * No resolved hospital problems. *      Assessment / Plan:  81 year old man with PMH active tobacco 60+ pack years, BPH, HTN, HLD, ?CAD presents with acute progressives respiratory failure including orthopnea and pnd as well as increased dyspnea with exertion found acute severe systolic heart failure.    -titrate vasodilators; first hydralazine and isordil until LUIS FELIPE resolves  -eventually titrate on HFOMT  -transfer to [facility]   -[facility] for ischemic testing  -care management to help patient with his medical issues and for help for his sister    I personally discussed his case with Dr Starr    It is my pleasure to participate in the care of Mr. Doherty.  Please do not hesitate to contact me with questions or concerns.    Ike Crowley MD  Cardiologist [facility]

## 2022-01-24 NOTE — CARE PLAN
The patient is     Shift Goals  Clinical Goals: wean O2, no sob  Patient Goals: rest, go home    Progress made toward(s) clinical / shift goals:      Patient is not progressing towards the following goals:

## 2022-01-24 NOTE — PROGRESS NOTES
Spoke to Martha from microbiology. Patient does have coag neg staff. Not MRSA. Not staff aureus.     MD Starr notified.

## 2022-01-24 NOTE — CARE PLAN
The patient is Watcher - Medium risk of patient condition declining or worsening    Shift Goals  Clinical Goals: no sob, wean O2, transfer [facility]  Patient Goals: rest, go     Progress made toward(s) clinical / shift goals:  Patient has increased shortness of breath. Patient agreeable to using oxymask as he is a mouth breather.     Patient is not progressing towards the following goals:      Problem: Impaired Gas Exchange  Goal: Patient will demonstrate improved ventilation and adequate oxygenation and participate in treatment regimen within the level of ability/situation.  Outcome: Not Progressing  Note: Patient still demonstrates sob. Oxygen demands have increased overnight.

## 2022-01-25 ENCOUNTER — PATIENT OUTREACH (OUTPATIENT)
Dept: HEALTH INFORMATION MANAGEMENT | Facility: OTHER | Age: 82
End: 2022-01-25

## 2022-01-25 PROBLEM — E46 PROTEIN CALORIE MALNUTRITION (HCC): Status: ACTIVE | Noted: 2022-01-25

## 2022-01-25 PROBLEM — N28.89 RENAL MASS, RIGHT: Status: ACTIVE | Noted: 2022-01-25

## 2022-01-25 PROBLEM — Z78.9 ALCOHOL USE: Status: ACTIVE | Noted: 2022-01-25

## 2022-01-25 PROBLEM — N30.00 ACUTE CYSTITIS WITHOUT HEMATURIA: Status: ACTIVE | Noted: 2022-01-25

## 2022-01-25 PROBLEM — I34.0 SEVERE MITRAL REGURGITATION: Status: ACTIVE | Noted: 2022-01-25

## 2022-01-25 PROBLEM — J96.21 ACUTE ON CHRONIC RESPIRATORY FAILURE WITH HYPOXIA (HCC): Status: ACTIVE | Noted: 2022-01-23

## 2022-01-25 LAB
ANION GAP SERPL CALC-SCNC: 11 MMOL/L (ref 7–16)
APTT PPP: 32.1 SEC (ref 24.7–36)
BACTERIA BLD CULT: ABNORMAL
BACTERIA BLD CULT: ABNORMAL
BUN SERPL-MCNC: 43 MG/DL (ref 8–22)
CALCIUM SERPL-MCNC: 8.6 MG/DL (ref 8.5–10.5)
CHLORIDE SERPL-SCNC: 105 MMOL/L (ref 96–112)
CHOLEST SERPL-MCNC: 115 MG/DL (ref 100–199)
CO2 SERPL-SCNC: 19 MMOL/L (ref 20–33)
CREAT SERPL-MCNC: 1.71 MG/DL (ref 0.5–1.4)
D DIMER PPP IA.FEU-MCNC: 1.37 UG/ML (FEU) (ref 0–0.5)
EKG IMPRESSION: NORMAL
ERYTHROCYTE [DISTWIDTH] IN BLOOD BY AUTOMATED COUNT: 47.9 FL (ref 35.9–50)
EST. AVERAGE GLUCOSE BLD GHB EST-MCNC: 105 MG/DL
FERRITIN SERPL-MCNC: 192 NG/ML (ref 22–322)
GLUCOSE SERPL-MCNC: 106 MG/DL (ref 65–99)
HBA1C MFR BLD: 5.3 % (ref 4–5.6)
HCT VFR BLD AUTO: 37.4 % (ref 42–52)
HDLC SERPL-MCNC: 49 MG/DL
HGB BLD-MCNC: 12.6 G/DL (ref 14–18)
HGB RETIC QN AUTO: 33.6 PG/CELL (ref 29–35)
IMM RETICS NFR: 11.7 % (ref 9.3–17.4)
IRON SATN MFR SERPL: 24 % (ref 15–55)
IRON SERPL-MCNC: 64 UG/DL (ref 50–180)
LACTATE BLD-SCNC: 1.2 MMOL/L (ref 0.5–2)
LACTATE BLD-SCNC: 1.4 MMOL/L (ref 0.5–2)
LACTATE BLD-SCNC: 1.5 MMOL/L (ref 0.5–2)
LDLC SERPL CALC-MCNC: 50 MG/DL
MCH RBC QN AUTO: 30.7 PG (ref 27–33)
MCHC RBC AUTO-ENTMCNC: 33.7 G/DL (ref 33.7–35.3)
MCV RBC AUTO: 91.2 FL (ref 81.4–97.8)
NT-PROBNP SERPL IA-MCNC: ABNORMAL PG/ML (ref 0–125)
PLATELET # BLD AUTO: 198 K/UL (ref 164–446)
PMV BLD AUTO: 12.1 FL (ref 9–12.9)
POTASSIUM SERPL-SCNC: 4.2 MMOL/L (ref 3.6–5.5)
RBC # BLD AUTO: 4.1 M/UL (ref 4.7–6.1)
RETICS # AUTO: 0.05 M/UL (ref 0.04–0.06)
RETICS/RBC NFR: 1.1 % (ref 0.8–2.1)
SARS-COV-2 AB SERPL QL IA: NORMAL
SIGNIFICANT IND 70042: ABNORMAL
SITE SITE: ABNORMAL
SODIUM SERPL-SCNC: 135 MMOL/L (ref 135–145)
SOURCE SOURCE: ABNORMAL
T4 FREE SERPL-MCNC: 1.23 NG/DL (ref 0.93–1.7)
TIBC SERPL-MCNC: 272 UG/DL (ref 250–450)
TRIGL SERPL-MCNC: 78 MG/DL (ref 0–149)
TROPONIN T SERPL-MCNC: 193 NG/L (ref 6–19)
TROPONIN T SERPL-MCNC: 204 NG/L (ref 6–19)
TSH SERPL DL<=0.005 MIU/L-ACNC: 0.61 UIU/ML (ref 0.38–5.33)
UFH PPP CHRO-ACNC: <0.1 IU/ML
UIBC SERPL-MCNC: 208 UG/DL (ref 110–370)
VIT B12 SERPL-MCNC: 389 PG/ML (ref 211–911)
WBC # BLD AUTO: 13.4 K/UL (ref 4.8–10.8)

## 2022-01-25 PROCEDURE — 700102 HCHG RX REV CODE 250 W/ 637 OVERRIDE(OP): Performed by: INTERNAL MEDICINE

## 2022-01-25 PROCEDURE — 80048 BASIC METABOLIC PNL TOTAL CA: CPT

## 2022-01-25 PROCEDURE — 700111 HCHG RX REV CODE 636 W/ 250 OVERRIDE (IP): Performed by: STUDENT IN AN ORGANIZED HEALTH CARE EDUCATION/TRAINING PROGRAM

## 2022-01-25 PROCEDURE — 99221 1ST HOSP IP/OBS SF/LOW 40: CPT | Performed by: INTERNAL MEDICINE

## 2022-01-25 PROCEDURE — 99233 SBSQ HOSP IP/OBS HIGH 50: CPT | Mod: GC | Performed by: INTERNAL MEDICINE

## 2022-01-25 PROCEDURE — A9270 NON-COVERED ITEM OR SERVICE: HCPCS | Performed by: INTERNAL MEDICINE

## 2022-01-25 PROCEDURE — 82728 ASSAY OF FERRITIN: CPT

## 2022-01-25 PROCEDURE — 83550 IRON BINDING TEST: CPT

## 2022-01-25 PROCEDURE — 700111 HCHG RX REV CODE 636 W/ 250 OVERRIDE (IP): Performed by: INTERNAL MEDICINE

## 2022-01-25 PROCEDURE — 93005 ELECTROCARDIOGRAM TRACING: CPT | Performed by: STUDENT IN AN ORGANIZED HEALTH CARE EDUCATION/TRAINING PROGRAM

## 2022-01-25 PROCEDURE — 700101 HCHG RX REV CODE 250: Performed by: STUDENT IN AN ORGANIZED HEALTH CARE EDUCATION/TRAINING PROGRAM

## 2022-01-25 PROCEDURE — 84439 ASSAY OF FREE THYROXINE: CPT

## 2022-01-25 PROCEDURE — 83540 ASSAY OF IRON: CPT

## 2022-01-25 PROCEDURE — 83036 HEMOGLOBIN GLYCOSYLATED A1C: CPT

## 2022-01-25 PROCEDURE — A9270 NON-COVERED ITEM OR SERVICE: HCPCS | Performed by: STUDENT IN AN ORGANIZED HEALTH CARE EDUCATION/TRAINING PROGRAM

## 2022-01-25 PROCEDURE — 700102 HCHG RX REV CODE 250 W/ 637 OVERRIDE(OP): Performed by: STUDENT IN AN ORGANIZED HEALTH CARE EDUCATION/TRAINING PROGRAM

## 2022-01-25 PROCEDURE — 85730 THROMBOPLASTIN TIME PARTIAL: CPT

## 2022-01-25 PROCEDURE — 84443 ASSAY THYROID STIM HORMONE: CPT

## 2022-01-25 PROCEDURE — 94669 MECHANICAL CHEST WALL OSCILL: CPT

## 2022-01-25 PROCEDURE — 700105 HCHG RX REV CODE 258: Performed by: STUDENT IN AN ORGANIZED HEALTH CARE EDUCATION/TRAINING PROGRAM

## 2022-01-25 PROCEDURE — 80061 LIPID PANEL: CPT

## 2022-01-25 PROCEDURE — 85027 COMPLETE CBC AUTOMATED: CPT

## 2022-01-25 PROCEDURE — 94640 AIRWAY INHALATION TREATMENT: CPT

## 2022-01-25 PROCEDURE — 700105 HCHG RX REV CODE 258: Performed by: INTERNAL MEDICINE

## 2022-01-25 PROCEDURE — 51798 US URINE CAPACITY MEASURE: CPT

## 2022-01-25 PROCEDURE — 85046 RETICYTE/HGB CONCENTRATE: CPT

## 2022-01-25 PROCEDURE — 770020 HCHG ROOM/CARE - TELE (206)

## 2022-01-25 PROCEDURE — 36415 COLL VENOUS BLD VENIPUNCTURE: CPT

## 2022-01-25 PROCEDURE — 83880 ASSAY OF NATRIURETIC PEPTIDE: CPT

## 2022-01-25 PROCEDURE — 84484 ASSAY OF TROPONIN QUANT: CPT

## 2022-01-25 PROCEDURE — 82607 VITAMIN B-12: CPT

## 2022-01-25 PROCEDURE — 85379 FIBRIN DEGRADATION QUANT: CPT

## 2022-01-25 PROCEDURE — 93010 ELECTROCARDIOGRAM REPORT: CPT | Performed by: INTERNAL MEDICINE

## 2022-01-25 PROCEDURE — 85520 HEPARIN ASSAY: CPT

## 2022-01-25 PROCEDURE — 83605 ASSAY OF LACTIC ACID: CPT | Mod: 91

## 2022-01-25 PROCEDURE — 86769 SARS-COV-2 COVID-19 ANTIBODY: CPT

## 2022-01-25 RX ORDER — GAUZE BANDAGE 2" X 2"
100 BANDAGE TOPICAL DAILY
Status: DISCONTINUED | OUTPATIENT
Start: 2022-01-25 | End: 2022-01-25

## 2022-01-25 RX ORDER — POLYETHYLENE GLYCOL 3350 17 G/17G
1 POWDER, FOR SOLUTION ORAL
Status: DISCONTINUED | OUTPATIENT
Start: 2022-01-25 | End: 2022-01-25

## 2022-01-25 RX ORDER — ACETAMINOPHEN 325 MG/1
650 TABLET ORAL EVERY 6 HOURS PRN
Status: DISCONTINUED | OUTPATIENT
Start: 2022-01-25 | End: 2022-01-28 | Stop reason: HOSPADM

## 2022-01-25 RX ORDER — HEPARIN SODIUM 1000 [USP'U]/ML
30 INJECTION, SOLUTION INTRAVENOUS; SUBCUTANEOUS PRN
Status: DISCONTINUED | OUTPATIENT
Start: 2022-01-25 | End: 2022-01-25

## 2022-01-25 RX ORDER — AZITHROMYCIN 250 MG/1
500 TABLET, FILM COATED ORAL DAILY
Status: COMPLETED | OUTPATIENT
Start: 2022-01-25 | End: 2022-01-25

## 2022-01-25 RX ORDER — FOLIC ACID 1 MG/1
1 TABLET ORAL DAILY
Status: DISCONTINUED | OUTPATIENT
Start: 2022-01-25 | End: 2022-01-28 | Stop reason: HOSPADM

## 2022-01-25 RX ORDER — NITROGLYCERIN 0.4 MG/1
0.4 TABLET SUBLINGUAL
Status: DISCONTINUED | OUTPATIENT
Start: 2022-01-25 | End: 2022-01-28 | Stop reason: HOSPADM

## 2022-01-25 RX ORDER — HYDROXYZINE HYDROCHLORIDE 25 MG/1
25 TABLET, FILM COATED ORAL 3 TIMES DAILY PRN
Status: CANCELLED | OUTPATIENT
Start: 2022-01-25

## 2022-01-25 RX ORDER — HYDRALAZINE HYDROCHLORIDE 25 MG/1
25 TABLET, FILM COATED ORAL EVERY 8 HOURS
Status: DISCONTINUED | OUTPATIENT
Start: 2022-01-25 | End: 2022-01-25 | Stop reason: CLARIF

## 2022-01-25 RX ORDER — LISINOPRIL 20 MG/1
20 TABLET ORAL 2 TIMES DAILY
Status: DISCONTINUED | OUTPATIENT
Start: 2022-01-25 | End: 2022-01-25

## 2022-01-25 RX ORDER — HEPARIN SODIUM 1000 [USP'U]/ML
60 INJECTION, SOLUTION INTRAVENOUS; SUBCUTANEOUS ONCE
Status: DISCONTINUED | OUTPATIENT
Start: 2022-01-25 | End: 2022-01-25

## 2022-01-25 RX ORDER — POLYETHYLENE GLYCOL 3350 17 G/17G
1 POWDER, FOR SOLUTION ORAL
Status: DISCONTINUED | OUTPATIENT
Start: 2022-01-25 | End: 2022-01-28 | Stop reason: HOSPADM

## 2022-01-25 RX ORDER — LISINOPRIL 20 MG/1
20 TABLET ORAL TWICE DAILY
Status: DISCONTINUED | OUTPATIENT
Start: 2022-01-25 | End: 2022-01-26

## 2022-01-25 RX ORDER — GAUZE BANDAGE 2" X 2"
100 BANDAGE TOPICAL DAILY
Status: DISCONTINUED | OUTPATIENT
Start: 2022-01-25 | End: 2022-01-28 | Stop reason: HOSPADM

## 2022-01-25 RX ORDER — FUROSEMIDE 10 MG/ML
40 INJECTION INTRAMUSCULAR; INTRAVENOUS
Status: DISCONTINUED | OUTPATIENT
Start: 2022-01-25 | End: 2022-01-25

## 2022-01-25 RX ORDER — PREDNISONE 20 MG/1
40 TABLET ORAL DAILY
Status: COMPLETED | OUTPATIENT
Start: 2022-01-25 | End: 2022-01-28

## 2022-01-25 RX ORDER — BENZONATATE 100 MG/1
100 CAPSULE ORAL 3 TIMES DAILY
Status: DISCONTINUED | OUTPATIENT
Start: 2022-01-25 | End: 2022-01-28 | Stop reason: HOSPADM

## 2022-01-25 RX ORDER — GUAIFENESIN/DEXTROMETHORPHAN 100-10MG/5
10 SYRUP ORAL EVERY 6 HOURS PRN
Status: DISCONTINUED | OUTPATIENT
Start: 2022-01-25 | End: 2022-01-25

## 2022-01-25 RX ORDER — FUROSEMIDE 10 MG/ML
40 INJECTION INTRAMUSCULAR; INTRAVENOUS
Status: COMPLETED | OUTPATIENT
Start: 2022-01-25 | End: 2022-01-26

## 2022-01-25 RX ORDER — ATORVASTATIN CALCIUM 80 MG/1
80 TABLET, FILM COATED ORAL DAILY
Status: DISCONTINUED | OUTPATIENT
Start: 2022-01-25 | End: 2022-01-28 | Stop reason: HOSPADM

## 2022-01-25 RX ORDER — HYDROXYZINE HYDROCHLORIDE 25 MG/1
25 TABLET, FILM COATED ORAL 3 TIMES DAILY PRN
Status: DISCONTINUED | OUTPATIENT
Start: 2022-01-25 | End: 2022-01-25

## 2022-01-25 RX ORDER — TAMSULOSIN HYDROCHLORIDE 0.4 MG/1
0.4 CAPSULE ORAL EVERY EVENING
Status: DISCONTINUED | OUTPATIENT
Start: 2022-01-25 | End: 2022-01-25

## 2022-01-25 RX ORDER — HEPARIN SODIUM 5000 [USP'U]/ML
5000 INJECTION, SOLUTION INTRAVENOUS; SUBCUTANEOUS EVERY 8 HOURS
Status: COMPLETED | OUTPATIENT
Start: 2022-01-25 | End: 2022-01-25

## 2022-01-25 RX ORDER — FLUTICASONE PROPIONATE 50 MCG
2 SPRAY, SUSPENSION (ML) NASAL DAILY
Status: DISCONTINUED | OUTPATIENT
Start: 2022-01-25 | End: 2022-01-28 | Stop reason: HOSPADM

## 2022-01-25 RX ORDER — HEPARIN SODIUM 5000 [USP'U]/100ML
0-30 INJECTION, SOLUTION INTRAVENOUS CONTINUOUS
Status: DISCONTINUED | OUTPATIENT
Start: 2022-01-25 | End: 2022-01-25

## 2022-01-25 RX ORDER — ONDANSETRON 2 MG/ML
4 INJECTION INTRAMUSCULAR; INTRAVENOUS EVERY 4 HOURS PRN
Status: DISCONTINUED | OUTPATIENT
Start: 2022-01-25 | End: 2022-01-28 | Stop reason: HOSPADM

## 2022-01-25 RX ORDER — ONDANSETRON 4 MG/1
4 TABLET, ORALLY DISINTEGRATING ORAL EVERY 4 HOURS PRN
Status: DISCONTINUED | OUTPATIENT
Start: 2022-01-25 | End: 2022-01-28 | Stop reason: HOSPADM

## 2022-01-25 RX ORDER — AMOXICILLIN 250 MG
2 CAPSULE ORAL 2 TIMES DAILY
Status: DISCONTINUED | OUTPATIENT
Start: 2022-01-25 | End: 2022-01-28 | Stop reason: HOSPADM

## 2022-01-25 RX ORDER — BENZONATATE 100 MG/1
100 CAPSULE ORAL 3 TIMES DAILY PRN
Status: DISCONTINUED | OUTPATIENT
Start: 2022-01-25 | End: 2022-01-25

## 2022-01-25 RX ORDER — PREDNISONE 20 MG/1
40 TABLET ORAL DAILY
Status: DISCONTINUED | OUTPATIENT
Start: 2022-01-25 | End: 2022-01-25

## 2022-01-25 RX ORDER — AMOXICILLIN 250 MG
2 CAPSULE ORAL 2 TIMES DAILY PRN
Status: DISCONTINUED | OUTPATIENT
Start: 2022-01-25 | End: 2022-01-25

## 2022-01-25 RX ORDER — BISACODYL 10 MG
10 SUPPOSITORY, RECTAL RECTAL
Status: DISCONTINUED | OUTPATIENT
Start: 2022-01-25 | End: 2022-01-28 | Stop reason: HOSPADM

## 2022-01-25 RX ORDER — METHYLPREDNISOLONE SODIUM SUCCINATE 125 MG/2ML
125 INJECTION, POWDER, LYOPHILIZED, FOR SOLUTION INTRAMUSCULAR; INTRAVENOUS EVERY 8 HOURS
Status: DISCONTINUED | OUTPATIENT
Start: 2022-01-25 | End: 2022-01-25

## 2022-01-25 RX ORDER — HYDRALAZINE HYDROCHLORIDE 20 MG/ML
10 INJECTION INTRAMUSCULAR; INTRAVENOUS EVERY 4 HOURS PRN
Status: DISCONTINUED | OUTPATIENT
Start: 2022-01-25 | End: 2022-01-28 | Stop reason: HOSPADM

## 2022-01-25 RX ORDER — GUAIFENESIN 600 MG/1
600 TABLET, EXTENDED RELEASE ORAL EVERY 12 HOURS
Status: DISCONTINUED | OUTPATIENT
Start: 2022-01-25 | End: 2022-01-25

## 2022-01-25 RX ORDER — BISACODYL 10 MG
10 SUPPOSITORY, RECTAL RECTAL
Status: DISCONTINUED | OUTPATIENT
Start: 2022-01-25 | End: 2022-01-25

## 2022-01-25 RX ORDER — GUAIFENESIN/DEXTROMETHORPHAN 100-10MG/5
10 SYRUP ORAL EVERY 6 HOURS
Status: DISCONTINUED | OUTPATIENT
Start: 2022-01-25 | End: 2022-01-28 | Stop reason: HOSPADM

## 2022-01-25 RX ORDER — ATORVASTATIN CALCIUM 40 MG/1
40 TABLET, FILM COATED ORAL DAILY
Status: DISCONTINUED | OUTPATIENT
Start: 2022-01-25 | End: 2022-01-25

## 2022-01-25 RX ORDER — SODIUM CHLORIDE 9 MG/ML
INJECTION, SOLUTION INTRAVENOUS CONTINUOUS
Status: DISCONTINUED | OUTPATIENT
Start: 2022-01-25 | End: 2022-01-25

## 2022-01-25 RX ADMIN — BENZONATATE 100 MG: 100 CAPSULE ORAL at 12:57

## 2022-01-25 RX ADMIN — HYDRALAZINE HYDROCHLORIDE 25 MG: 25 TABLET, FILM COATED ORAL at 14:51

## 2022-01-25 RX ADMIN — THERA TABS 1 TABLET: TAB at 07:10

## 2022-01-25 RX ADMIN — FUROSEMIDE 40 MG: 10 INJECTION, SOLUTION INTRAMUSCULAR; INTRAVENOUS at 18:27

## 2022-01-25 RX ADMIN — PREDNISONE 40 MG: 20 TABLET ORAL at 12:57

## 2022-01-25 RX ADMIN — IPRATROPIUM BROMIDE AND ALBUTEROL SULFATE 3 ML: .5; 2.5 SOLUTION RESPIRATORY (INHALATION) at 06:05

## 2022-01-25 RX ADMIN — ISOSORBIDE DINITRATE 20 MG: 10 TABLET ORAL at 07:10

## 2022-01-25 RX ADMIN — ATORVASTATIN CALCIUM 80 MG: 80 TABLET, FILM COATED ORAL at 09:34

## 2022-01-25 RX ADMIN — ISOSORBIDE DINITRATE 20 MG: 10 TABLET ORAL at 14:52

## 2022-01-25 RX ADMIN — AZITHROMYCIN MONOHYDRATE 500 MG: 250 TABLET ORAL at 09:20

## 2022-01-25 RX ADMIN — HEPARIN SODIUM 5000 UNITS: 5000 INJECTION, SOLUTION INTRAVENOUS; SUBCUTANEOUS at 09:19

## 2022-01-25 RX ADMIN — HYDRALAZINE HYDROCHLORIDE 25 MG: 25 TABLET, FILM COATED ORAL at 09:17

## 2022-01-25 RX ADMIN — ASPIRIN 81 MG: 81 TABLET, COATED ORAL at 07:10

## 2022-01-25 RX ADMIN — GUAIFENESIN AND DEXTROMETHORPHAN 10 ML: 100; 10 SYRUP ORAL at 12:57

## 2022-01-25 RX ADMIN — IPRATROPIUM BROMIDE AND ALBUTEROL SULFATE 3 ML: .5; 2.5 SOLUTION RESPIRATORY (INHALATION) at 09:59

## 2022-01-25 RX ADMIN — BUDESONIDE AND FORMOTEROL FUMARATE DIHYDRATE 2 PUFF: 160; 4.5 AEROSOL RESPIRATORY (INHALATION) at 08:08

## 2022-01-25 RX ADMIN — HEPARIN SODIUM 5000 UNITS: 5000 INJECTION, SOLUTION INTRAVENOUS; SUBCUTANEOUS at 21:02

## 2022-01-25 RX ADMIN — SODIUM CHLORIDE 3 G: 900 INJECTION INTRAVENOUS at 09:20

## 2022-01-25 RX ADMIN — FOLIC ACID 1 MG: 1 TABLET ORAL at 07:10

## 2022-01-25 RX ADMIN — FUROSEMIDE 40 MG: 10 INJECTION, SOLUTION INTRAMUSCULAR; INTRAVENOUS at 09:18

## 2022-01-25 RX ADMIN — TAMSULOSIN HYDROCHLORIDE 0.4 MG: 0.4 CAPSULE ORAL at 18:27

## 2022-01-25 RX ADMIN — Medication 100 MG: at 07:10

## 2022-01-25 RX ADMIN — IPRATROPIUM BROMIDE AND ALBUTEROL SULFATE 3 ML: .5; 2.5 SOLUTION RESPIRATORY (INHALATION) at 19:47

## 2022-01-25 RX ADMIN — METHYLPREDNISOLONE SODIUM SUCCINATE 125 MG: 125 INJECTION, POWDER, FOR SOLUTION INTRAMUSCULAR; INTRAVENOUS at 09:18

## 2022-01-25 RX ADMIN — METHYLPREDNISOLONE SODIUM SUCCINATE 62.5 MG: 125 INJECTION, POWDER, FOR SOLUTION INTRAMUSCULAR; INTRAVENOUS at 00:00

## 2022-01-25 RX ADMIN — BUDESONIDE AND FORMOTEROL FUMARATE DIHYDRATE 2 PUFF: 160; 4.5 AEROSOL RESPIRATORY (INHALATION) at 18:29

## 2022-01-25 RX ADMIN — BENZONATATE 100 MG: 100 CAPSULE ORAL at 18:27

## 2022-01-25 RX ADMIN — NICOTINE 14 MG: 14 PATCH TRANSDERMAL at 07:11

## 2022-01-25 RX ADMIN — IPRATROPIUM BROMIDE AND ALBUTEROL SULFATE 3 ML: .5; 2.5 SOLUTION RESPIRATORY (INHALATION) at 00:49

## 2022-01-25 RX ADMIN — HEPARIN SODIUM 5000 UNITS: 5000 INJECTION, SOLUTION INTRAVENOUS; SUBCUTANEOUS at 14:52

## 2022-01-25 RX ADMIN — SODIUM CHLORIDE: 9 INJECTION, SOLUTION INTRAVENOUS at 00:09

## 2022-01-25 RX ADMIN — HYDROXYZINE HYDROCHLORIDE 25 MG: 25 TABLET, FILM COATED ORAL at 09:18

## 2022-01-25 RX ADMIN — GUAIFENESIN AND DEXTROMETHORPHAN 10 ML: 100; 10 SYRUP ORAL at 18:27

## 2022-01-25 RX ADMIN — LISINOPRIL 20 MG: 20 TABLET ORAL at 21:23

## 2022-01-25 SDOH — ECONOMIC STABILITY: FOOD INSECURITY: WITHIN THE PAST 12 MONTHS, THE FOOD YOU BOUGHT JUST DIDN'T LAST AND YOU DIDN'T HAVE MONEY TO GET MORE.: SOMETIMES TRUE

## 2022-01-25 SDOH — ECONOMIC STABILITY: INCOME INSECURITY: IN THE LAST 12 MONTHS, WAS THERE A TIME WHEN YOU WERE NOT ABLE TO PAY THE MORTGAGE OR RENT ON TIME?: YES

## 2022-01-25 SDOH — ECONOMIC STABILITY: HOUSING INSECURITY
IN THE LAST 12 MONTHS, WAS THERE A TIME WHEN YOU DID NOT HAVE A STEADY PLACE TO SLEEP OR SLEPT IN A SHELTER (INCLUDING NOW)?: NO

## 2022-01-25 SDOH — ECONOMIC STABILITY: FOOD INSECURITY: WITHIN THE PAST 12 MONTHS, YOU WORRIED THAT YOUR FOOD WOULD RUN OUT BEFORE YOU GOT MONEY TO BUY MORE.: SOMETIMES TRUE

## 2022-01-25 SDOH — ECONOMIC STABILITY: TRANSPORTATION INSECURITY
IN THE PAST 12 MONTHS, HAS LACK OF TRANSPORTATION KEPT YOU FROM MEETINGS, WORK, OR FROM GETTING THINGS NEEDED FOR DAILY LIVING?: NO

## 2022-01-25 SDOH — ECONOMIC STABILITY: TRANSPORTATION INSECURITY
IN THE PAST 12 MONTHS, HAS THE LACK OF TRANSPORTATION KEPT YOU FROM MEDICAL APPOINTMENTS OR FROM GETTING MEDICATIONS?: NO

## 2022-01-25 ASSESSMENT — ENCOUNTER SYMPTOMS
NAUSEA: 0
DIAPHORESIS: 0
VOMITING: 0
DIARRHEA: 0
HEADACHES: 0
DEPRESSION: 0
ABDOMINAL PAIN: 0
FEVER: 0
SHORTNESS OF BREATH: 1
CHILLS: 0
WHEEZING: 0
CLAUDICATION: 0
COUGH: 1
FOCAL WEAKNESS: 0
ORTHOPNEA: 0
SPUTUM PRODUCTION: 1
PALPITATIONS: 0
DIZZINESS: 0
WEAKNESS: 0
SORE THROAT: 0
SENSORY CHANGE: 0

## 2022-01-25 ASSESSMENT — PAIN DESCRIPTION - PAIN TYPE: TYPE: ACUTE PAIN

## 2022-01-25 ASSESSMENT — FIBROSIS 4 INDEX: FIB4 SCORE: 3.58

## 2022-01-25 ASSESSMENT — SOCIAL DETERMINANTS OF HEALTH (SDOH): HOW HARD IS IT FOR YOU TO PAY FOR THE VERY BASICS LIKE FOOD, HOUSING, MEDICAL CARE, AND HEATING?: HARD

## 2022-01-25 NOTE — ASSESSMENT & PLAN NOTE
Resolving. On admission SIRS 3/4 and qSOFA 1/3  History of alcohol consumption concerning for possible aspiration  History of BPH/straight cath concerning for possible urinary tract etiologies  Procalcitonin negative, UA concerning for infection  Unasyn and azithromycin for UTI and possible pneumonia as well as COPD exacerbation

## 2022-01-25 NOTE — DIETARY
Nutrition services: Day 1 of admit.  Laron Doherty is a 81 y.o. male with admitting DX of Combined systolic and diastolic heart failure     Consult received for failure to thrive. Pt also with low BMI and dx of protein calorie malnutrition (PCM)      Assessment:  Height: 182.9 cm (6')  Weight: 55.3 kg (121 lb 14.6 oz)(stand up scale)  Body mass index is 16.53 kg/m²., BMI classification: underweight  Diet/Intake: cardiac with 1500 mL fluid restriction    Evaluation:   1. DX includes COPD exacerbation, CHF w/ EF of 20%, LUIS FELIPE, sepsis, alcohol use, HTN, respiratory failure requiring 10 L of O2, renal mass, dyslipidemia. Plan is for coronary angiogram when his LUIS FELIPE improves. Per MD note, pt does not appear to be fluid overloaded.   2. RD met with pt in his room. Nurse present during visit. Pt reports 20-25 lb (14-17%) weight loss x 8 months. Wt loss is significant. He said that he normally eats a doughnuts and coffee for breakfast. He often will miss lunch and eat a small dinner. Over the past 8 months, his PO intake has been < 75% of normal.  Pt said that he will drink 2 Ensure supplements daily. RD encouraged protein intake. Pt agreed to addition of Boost supplements TID with his meals.   3. Meds: Lasix, folic acid, MVI+minerals, prednisone, thiamine    Malnutrition Risk: pt meets criteria for severe malnutrition in the context of chronic illness r/t decreased appetite most likely due to dx of CHF/COPD AEB report of 14-17% wt loss x 8 months and PO 75% of normal x 8 months.     Recommendations/Plan:  1. Add Boost Plus to meals TID   2. Encourage intake of >50%  3. Document intake of all meals and supplements as % taken in ADL's to provide interdisciplinary communication across all shifts.   4. Monitor weight.  5. Nutrition rep will continue to see patient for ongoing meal and snack preferences.     RD will follow.

## 2022-01-25 NOTE — PROGRESS NOTES
Assumed care of pt. Bedside report received from MER Andrade from [facility]. Pt was updated on plan of care. Call light, phone and personal belongings in reach. Bed alarm on and working properly, bed in lowest position, and locked.

## 2022-01-25 NOTE — ASSESSMENT & PLAN NOTE
Worsening, creatinine on presentation 1.54 most recent was 1.77  Ultrasound renal shows no hydronephrosis  History of BPH and enlarged prostate seen on renal ultrasound, however he straight caths regularly and unlikely due to retention  Urine electrolytes on presentation with FENa of 1.4% suggesting intrinsic process  Likely due to sepsis and UTI    Avoid nephrotoxic agents, holding lisinopril  Judicious IV fluids since he has an EF of 20% and very low BMI.  He does not appear to be in volume overload at this time.  Will start on slow rate fluid since she will be getting a left heart cath  nephrology consult if continues to worsen

## 2022-01-25 NOTE — PROGRESS NOTES
Community Health Worker Intake  • Social determinates of health intake :Completed.   • Identified barriers to food, housing, and medical care.  • Contact information provided to Laron Doherty : Yes/ bedside.  • Has PCP appointment scheduled for : Pt will schedule.  • Scheduled Food Delivery/Home Visit/Outpatient Visit: No.  • Accepted Meds-To-Beds.   • Inpatient assessment completed.    MADELAINE Salter met with pt at bedside and introduced Community Care Management services. Pt uses [facility] for primary care and does not express any difficulty with keeping appointments. Pt states they use bus and taxis for transportation and does not drive. Pt expressed they had barriers to accessing food, housing, and medical care. Pt expressed he wanted to apply for Medicaid even though they have [facility] and [facility] ins. Pt is aware of [employer] food pant[employer] and states that he did not like it. Pt feels confident in managing their  Health and lives alone in a mobile home. Pt does not have a support system. Pt uses oxygen at home.     Plan:  MADELAINE Salter discussed and provided pt with VA transportation, Rental Assistance Program, [employer], SNAP, and CCM information at bedside. CHW called patient financial assistance and requested for them to see pt to help them apply for Medicaid. CHW provided PFA information on AVS in case they discharge before being seen. MADELAINE Salter will discharge pt from [facility] and remove from caseload and master list as all goals have been met.

## 2022-01-25 NOTE — ASSESSMENT & PLAN NOTE
Unclear hx, pt reports some cardiac history but cannot elaborate   Cardiology consulted, rec transfer to [facility] for cardiac cath

## 2022-01-25 NOTE — PROGRESS NOTES
Discussed current EKG with cardiology APRN. Patient resting at this time. RN to straight cath patient for post lasix assessment.

## 2022-01-25 NOTE — ASSESSMENT & PLAN NOTE
Presenting with sepsis, UA concerning for infection.  History of BPH and daily straight caths  Will be on Unasyn and azithromycin for possible pneumonia as well  Ultrasound renal shows no hydronephrosis  Follow blood cultures and urine cultures

## 2022-01-25 NOTE — H&P
"Hospital Medicine History & Physical Note    Date of Service  1/24/2022    Primary Care Physician  Pcp Pt States None    Consultants  cardiology    Code Status  DNAR/DNI    Chief Complaint  No chief complaint on file.      History of Presenting Illness  Laron Doherty is a 81 y.o. male who was transferred from [facility] 1/24/2022 for left heart cath per cardiology.  Per Dr. Starr's discharge summary from [facility] \"81-year-old male  with a past medical history of tobacco use, dyslipidemia on atorvastatin, CAD on ASA, BPH on tamsulosin hypertension on lisinopril and amlodipine, and [facility] requiring presents emergency department via EMS for sudden onset worsening dyspnea.  In the ER patient tachycardic, tachypneic and hypoxic requiring 4 L nasal cannula.  Patient found to have LUIS FELIPE and sepsis started on IV antibiotics.  Also noted to be in COPD exacerbation secondary to pneumonia in the bilateral lower lobes started on steroids and SVNs.  UA also positive for UTI, patient has a history of straight cathing himself around 2-3 times a day.  Renal ultrasound showed indeterminate 2.5 cm calcified mass in the upper pole the right kidney recommend further evaluation with CT with contrast, will need to wait for renal improvement.  Echocardiogram showed severely reduced EF 20%, grade 2 diastolic dysfunction, and severe mitral regurgitation that is functional related to restricted movement of the posterior leaflet.  Cardiology was consulted, discussed with Dr. Stovall who recommends left heart cath and transfer to [facility]. \"    Evaluation on arrival he is requiring 10 L O2, however  he is frequently taking off his facemask.  Says he has 1 shot of whiskey daily but no more than that.  Monitor for withdrawal.    I discussed the plan of care with patient and bedside RN.    Review of Systems  Review of Systems   Constitutional: Negative for chills and " fever.   HENT: Negative for sore throat.    Respiratory: Positive for cough and shortness of breath.    Cardiovascular: Negative for chest pain.   Gastrointestinal: Negative for abdominal pain, diarrhea, nausea and vomiting.   Genitourinary: Negative for dysuria and urgency.   Neurological: Negative for dizziness and headaches.   All other systems reviewed and are negative.      Past Medical History   has a past medical history of Benign tumor of brain (HCC).    Surgical History   has a past surgical history that includes other orthopedic surgery.     Family History  family history is not on file.   Family history reviewed with patient. There is no family history that is pertinent to the chief complaint.     Social History   reports that he has been smoking. He does not have any smokeless tobacco history on file. He reports current alcohol use. He reports that he does not use drugs.    Allergies  No Known Allergies    Medications  Prior to Admission Medications   Prescriptions Last Dose Informant Patient Reported? Taking?   Ascorbic Acid (VITAMIN C PO) 1/21/2022 at 0900 Patient Yes No   Sig: Take 2 Tablets by mouth every evening.   aspirin EC (ECOTRIN) 81 MG Tablet Delayed Response 1/24/2022 at 0900 Patient Yes No   Sig: Take 81 mg by mouth every evening.   atorvastatin (LIPITOR) 40 MG Tab 1/21/2022 at 0900 Patient's Home Pharmacy Yes No   Sig: Take 40 mg by mouth every day.   lisinopril (PRINIVIL) 20 MG Tab 1/21/2022 at 0900 Patient's Home Pharmacy Yes No   Sig: Take 20 mg by mouth 2 times a day.   tamsulosin (FLOMAX) 0.4 MG capsule 1/21/2022 at 0900 Patient's Home Pharmacy Yes No   Sig: Take 0.4 mg by mouth every evening.      Facility-Administered Medications: None       Physical Exam  Temp:  [36.2 °C (97.2 °F)-37 °C (98.6 °F)] 37 °C (98.6 °F)  Pulse:  [102-144] 144  Resp:  [16-30] 22  BP: (113-159)/() 119/83  SpO2:  [83 %-96 %] 96 %  Blood Pressure : 119/83   Temperature: 37 °C (98.6 °F)   Pulse: (!) 144    Respiration: (!) 22   Pulse Oximetry: 96 %       Physical Exam  Constitutional:       Appearance: He is ill-appearing.      Comments: thin   HENT:      Head: Normocephalic and atraumatic.      Mouth/Throat:      Mouth: Mucous membranes are moist.      Pharynx: Oropharynx is clear. No oropharyngeal exudate or posterior oropharyngeal erythema.   Eyes:      General: No scleral icterus.  Cardiovascular:      Rate and Rhythm: Regular rhythm. Tachycardia present.      Pulses: Normal pulses.      Heart sounds: Murmur heard.       Pulmonary:      Effort: Respiratory distress present.      Breath sounds: Wheezing present.   Abdominal:      Palpations: Abdomen is soft.      Tenderness: There is no abdominal tenderness.   Musculoskeletal:         General: No swelling or tenderness. Normal range of motion.      Cervical back: Normal range of motion.   Skin:     General: Skin is warm and dry.   Neurological:      General: No focal deficit present.      Mental Status: He is alert and oriented to person, place, and time. Mental status is at baseline.   Psychiatric:         Mood and Affect: Mood normal.         Laboratory:  Recent Labs     01/23/22  0700 01/24/22 0218   WBC 14.4* 8.1   RBC 5.58 4.47*   HEMOGLOBIN 17.0 13.7*   HEMATOCRIT 52.9* 41.1*   MCV 94.8 91.9   MCH 30.5 30.6   MCHC 32.1* 33.3*   RDW 49.0 47.3   PLATELETCT 270 198   MPV 11.3 11.6     Recent Labs     01/23/22  0700 01/24/22  0218 01/24/22  1337   SODIUM 139 139 140   POTASSIUM 4.2 4.4 4.6   CHLORIDE 105 106 107   CO2 20 18* 22   GLUCOSE 188* 141* 123*   BUN 27* 30* 37*   CREATININE 1.49* 1.54* 1.77*   CALCIUM 9.3 8.9 8.8     Recent Labs     01/23/22  0700 01/24/22  0218 01/24/22  1337   ALTSGPT <5 8 11   ASTSGOT 16 19 29   ALKPHOSPHAT 133* 104* 91   TBILIRUBIN 0.4 0.4 0.4   GLUCOSE 188* 141* 123*         Recent Labs     01/23/22  0700 01/24/22  0753   NTPROBNP 9045* 04674*         Recent Labs     01/24/22  0753 01/24/22  1337 01/24/22  1921   TROPONINT 94*  135* 136*       Imaging:  No orders to display       X-Ray:  I have personally reviewed the images and compared with prior images.  EKG:  I have personally reviewed the images and compared with prior images.    Assessment/Plan:  I anticipate this patient will require at least two midnights for appropriate medical management, necessitating inpatient admission.    * COPD exacerbation (HCC)- (present on admission)  Assessment & Plan  Chronic smoker with history of COPD does not appear to be on home inhalers.  He is on home oxygen but says he only uses it as needed  [facility]  Still having significant wheezing on exam  Methylprednisolone, duo nebs, Symbicort  Tobacco cessation counseling    Acute combined systolic (congestive) and diastolic (congestive) heart failure (HCC)- (present on admission)  Assessment & Plan  Does not appear to be volume overloaded at this time.  He does have a significantly elevated BNP which is likely due to his chronic lung disease and LUIS FELIPE  Echo showed EF of 20%, grade 2 diastolic dysfunction.  Global hypokinesis that is most pronounced inferiorly.  Severe MR, RVSP of 40  Cardiology consulted and recommended left heart cath for ischemic work-up    LUIS FELIPE (acute kidney injury) (HCC)- (present on admission)  Assessment & Plan  Worsening, creatinine on presentation 1.54 most recent was 1.77  Ultrasound renal shows no hydronephrosis  History of BPH and enlarged prostate seen on renal ultrasound, however he straight caths regularly and unlikely due to retention  Urine electrolytes on presentation with FENa of 1.4% suggesting intrinsic process  Likely due to sepsis and UTI    Avoid nephrotoxic agents, holding lisinopril  Judicious IV fluids since he has an EF of 20% and very low BMI.  He does not appear to be in volume overload at this time.  Will start on slow rate fluid since she will be getting a left heart cath  nephrology consult if  continues to worsen    Sepsis (HCC)- (present on admission)  Assessment & Plan  Resolving. On admission SIRS 3/4 and qSOFA 1/3  History of alcohol consumption concerning for possible aspiration  History of BPH/straight cath concerning for possible urinary tract etiologies  Procalcitonin negative, UA concerning for infection  Unasyn and azithromycin for UTI and possible pneumonia as well as COPD exacerbation    Alcohol use- (present on admission)  Assessment & Plan  Says he has 1 shot of whiskey daily but no more than that.  Denies history of withdrawal.  His BMI is low so that may be too much for him, counseled on cessation  Thiamine, folate, multivitamin  Monitor for withdrawal    Acute cystitis without hematuria- (present on admission)  Assessment & Plan  Presenting with sepsis, UA concerning for infection.  History of BPH and daily straight caths  Will be on Unasyn and azithromycin for possible pneumonia as well  Ultrasound renal shows no hydronephrosis  Follow blood cultures and urine cultures    Tobacco abuse- (present on admission)  Assessment & Plan  Says he smokes 18 cigarettes daily.  History of COPD with active exacerbation  Nicotine patch ordered    Primary hypertension- (present on admission)  Assessment & Plan  On lisinopril at home, hold due to LUIS FELIPE.  He also had low pressures while at [facility]  He will be on isosorbide dinitrate and hydralazine per cardiology    Acute on chronic respiratory failure with hypoxia (HCC)- (present on admission)  Assessment & Plan  History of COPD, does not appear to be on home inhalers.  Says he does have home oxygen but only uses it as needed  Currently requiring 10 L on my evaluation.  His mask is off his face and he frequently takes it off it seems like  Due to COPD exacerbation, significant wheezing on exam  He does have CHF but does not appear to be volume overloaded at this time  See COPD exacerbation    Severe mitral regurgitation- (present on  admission)  Assessment & Plan  Per echo this admit: Severe mitral regurgitation- functional, related to restricted movement   [facility], get records to review prior echos if available     Protein calorie malnutrition (HCC)- (present on admission)  Assessment & Plan  BMI 14.05.  History of COPD with frequent exacerbations and current active smoking  Dietary consult    Renal mass, right- (present on admission)  Assessment & Plan  2.5 cm calcified mass seen on ultrasound, indeterminate.  Recommended CT with contrast for further evaluation  Hold off until LUIS FELIPE resolves    Benign prostatic hyperplasia with urinary retention- (present on admission)  Assessment & Plan  On tamsulosin and straight caths 2-3 times daily  Continue tamsulosin and straight cath    Dyslipidemia- (present on admission)  Assessment & Plan  Continue atorvastatin    Pulmonary infiltrates- (present on admission)  Assessment & Plan  Noted on CXR on admission. Procalcitonin low  Concerning for CHF or aspiration pneumonitis  Management as per above      VTE prophylaxis: SCDs/TEDs

## 2022-01-25 NOTE — PROGRESS NOTES
md notified that pt has hr in 150s sustained and is on dobutrex at 2.5,  Asked if he would like to go up on rate no orders received at this time

## 2022-01-25 NOTE — CARE PLAN
Problem: Bronchoconstriction  Goal: Improve in air movement and diminished wheezing  Description: Target End Date:  2 to 3 days    1.  Implement inhaled treatments  2.  Evaluate and manage medication effects  Outcome: Progressing   Duoneb Q4

## 2022-01-25 NOTE — ASSESSMENT & PLAN NOTE
BMI 14.05.  History of COPD with frequent exacerbations and current active smoking  Dietary consult

## 2022-01-25 NOTE — CARE PLAN
The patient is Watcher - Medium risk of patient condition declining or worsening    Shift Goals monitor O2 saturation  Clinical Goals: monitor O2  Patient Goals: rest  Family Goals: n/a    Progress made toward(s) clinical / shift goals:    Problem: Ineffective Airway Clearance  Goal: Patient will maintain patent airway with clear/clearing breath sounds  Outcome: Progressing   Coached patient about coughing and deep breathing  Problem: Impaired Gas Exchange  Goal: Patient will demonstrate improved ventilation and adequate oxygenation and participate in treatment regimen within the level of ability/situation.  Outcome: Progressing   Provided education about pursed lip breathing to patient.       Patient is not progressing towards the following goals:

## 2022-01-25 NOTE — PROGRESS NOTES
Daily Progress Note:     Date of Service: 1/25/2022  Primary Team: [facility] IM [facility] Team   Attending: Alberto Deluna M.D.   Senior Resident: Dr. Wang   Intern: Dr. Connelly  Contact:  [phone]    Chief Complaint:   Transfer from [facility] for [facility], presented there for shortness of breath and cough since December progressively getting worse    ID: Patient is an 81-year-old male with past medical history of dyslipidemia, hypertension, BPH, CKD stage 3a (BL Cr 1.3-1.9), tobacco use disorder presenting 1/24 after transfer from [facility] for left heart cath. Initially presented there for shortness of breath and wet cough that has been progressively worsening with onset few weeks prior. CXR 1/23: Bilateral lower lobe predominant pulmonary infiltrate. ECHO 1/23: LVEF 20%, global hypokinesis-most pronounced inferiorly; severe mitral osrnee-ofqadsmvdl-btaffdt to restricted movement of posterior leaflet.  Grade 2 diastolic dysfunction. US renal 1/23: Indeterminate a 2.5 cm calcified mass in the upper pole right kidney or adjacent adrenal gland.  UA 1/23 with many bacteria, 20-50 WBC, positive nitrites, negative leukocyte esterase-however patient without symptoms although he does have a history of self cathing 4 times daily. Cardiology consulted 1/24-hold off on [facility] given patient unable to lay flat without SOB. Now pending diuresis with 40 mg IV Lasix and LHC - NPO at midnight.     Interval events:  -No acute events overnight  -Cardiology 1/25: Needs ischemic work-up, with cath preferred but deferring until improvement in creatinine  -Seen on 10L oxymask this AM, able to be weaned to 5L s/p LASIX AM dose     Subjective:   -Patient with continued cough and shortness of breath that is only slightly improved today  -Denies CP, palpitations  -BL dry weight 119-123lbs he states   -Had BM yesterday    Consultants/Specialty:  Cardiology  PT/OT  Nutrition  SLP    Review of Systems:   Review of Systems   Constitutional:  Negative for chills and fever.   HENT: Negative for sore throat.    Respiratory: Positive for cough (wet) and shortness of breath (slightly improved).    Cardiovascular: Negative for chest pain.   Gastrointestinal: Negative for abdominal pain, diarrhea, nausea and vomiting.   Genitourinary: Negative for dysuria and urgency.   Neurological: Negative for dizziness and headaches.   All other systems reviewed and are negative.    Objective Data:   Physical Exam:   Vitals:   Temp:  [36.1 °C (96.9 °F)-37 °C (98.6 °F)] 36.1 °C (96.9 °F)  Pulse:  [] 100  Resp:  [17-30] 18  BP: (106-159)/() 111/65  SpO2:  [83 %-96 %] 92 %     Physical Exam  Constitutional:       Appearance: He is ill-appearing. He is not toxic-appearing.      Comments: Patient with temporal wasting, appears cachectic. Seen on 10L oxymask this AM   HENT:      Head: Normocephalic and atraumatic.      Nose: Congestion present.      Mouth/Throat:      Pharynx: No oropharyngeal exudate or posterior oropharyngeal erythema.   Eyes:      General: No scleral icterus.     Extraocular Movements: Extraocular movements intact.      Pupils: Pupils are equal, round, and reactive to light.      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 Tachycardia present.      Heart sounds: No murmur (unable to appreciate severe MR) heard.  No friction rub. No gallop.       Comments: JVD visualized  Pulmonary:      Effort: Respiratory distress present.      Breath sounds: No wheezing, rhonchi or rales.      Comments: Distant breath sounds, diffuse, b/l  Abdominal:      General: There is no distension.      Tenderness: There is no abdominal tenderness. There is no right CVA tenderness, left CVA tenderness, guarding or rebound.   Genitourinary:     Comments: Neg suprapubic tendernes  Musculoskeletal:      Right lower leg: No edema.      Left lower leg: No edema.      Comments: Patient requiring 2 person assist  to stand   Scalene use with exertion, inc SOB but does not desat with movement   Skin:     Coloration: Skin is not jaundiced.      Findings: No erythema.   Neurological:      General: No focal deficit present.      Mental Status: He is alert and oriented to person, place, and time. Mental status is at baseline.   Psychiatric:         Mood and Affect: Mood normal.         Behavior: Behavior normal.       Labs:     Recent Labs     01/23/22  0700 01/24/22  0218 01/25/22  0106   WBC 14.4* 8.1 13.4*   RBC 5.58 4.47* 4.10*   HEMOGLOBIN 17.0 13.7* 12.6*   HEMATOCRIT 52.9* 41.1* 37.4*   MCV 94.8 91.9 91.2   MCH 30.5 30.6 30.7   RDW 49.0 47.3 47.9   PLATELETCT 270 198 198   MPV 11.3 11.6 12.1   NEUTSPOLYS 76.60* 92.40*  --    LYMPHOCYTES 12.10* 5.10*  --    MONOCYTES 9.20 2.20  --    EOSINOPHILS 0.90 0.00  --    BASOPHILS 0.60 0.10  --      Recent Labs     01/24/22  0218 01/24/22  1337 01/25/22  0106   SODIUM 139 140 135   POTASSIUM 4.4 4.6 4.2   CHLORIDE 106 107 105   CO2 18* 22 19*   GLUCOSE 141* 123* 106*   BUN 30* 37* 43*   CREATININE 1.54* 1.77* 1.71*     Recent Labs     01/23/22  0700 01/24/22  0218 01/24/22  1337   ASTSGOT 16 19 29   ALTSGPT <5 8 11   TBILIRUBIN 0.4 0.4 0.4   ALKPHOSPHAT 133* 104* 91   GLOBULIN 3.0 2.5 2.3     Imaging:   CXR 1/23: Bilateral lower lobe predominant pulmonary infiltrate    ECHO 1/23: LVEF 20%, global hypokinesis-most pronounced inferiorly; severe mitral bnsyck-akuuitsary-voikjat to restricted movement of posterior leaflet.  Grade 2 diastolic dysfunction.    US renal 1/23: Indeterminate a 2.5 cm calcified mass in the upper pole right kidney or adjacent adrenal gland. Further evaluation with CT with contrast is recommended. Enlarged prostate.    Assessment and Plan    Patient is an 81-year-old male with past medical history of dyslipidemia, hypertension, BPH, CKD stage 3a (BL Cr 1.3-1.9), tobacco use disorder presenting 1/24 after transfer from [facility] for left heart cath. Initially  presented there for shortness of breath and wet cough that has been progressively worsening with onset few weeks prior. At the emergency department, vital signs with tachycardia in the 120s, tachypnea in the 20s. Patient required 15L oxymask  CBC with leukocytosis at 14.4, no anemia.  Chemistry with BUN 27 and creatinine 1.49. troponin 69 ->115 -> 204.  Lactic acid 2.4. CXR 1/23: Bilateral lower lobe predominant pulmonary infiltrate. ECHO 1/23: LVEF 20%, global hypokinesis-most pronounced inferiorly; severe mitral zkurhi-llzgdkhaan-xljllbc to restricted movement of posterior leaflet.  Grade 2 diastolic dysfunction. US renal 1/23: Indeterminate a 2.5 cm calcified mass in the upper pole right kidney or adjacent adrenal gland. Further evaluation with CT with contrast is recommended. Enlarged prostate. UA 1/23 with many bacteria, 20-50 WBC, positive nitrites, negative leukocyte esterase-however patient without symptoms although he does have a history of self cathing 4 times daily. Cardiology consulted 1/24-hold off on LHC given patient unable to lay flat without SOB. Now pending diuresis with 40 mg IV Lasix and LHC.    #Heart failure, reduced ejection fraction - systolic and diastolic dysfunction  #Hypertension   #Dyslipidemia  #Mitral regurgitation, severe    -BNP 73346 with trop 90 --> 136  -ECHO 1/23: LVEF 20% with global hypokinesis most pronounced inferiorly and severe MR (fntncl due to restricted mvmt) and grade II diastolic dysfunction  -CXR 1/23: Bilateral lower lobe predominant pulmonary infiltrate   -TSH WNL   -Per pt, BL weight 120lbs per patient - at BL on admission  **DDx: ischemic etiology vs non ischemic (HTN, tachy mediated with pt alc use; vs less likely infiltrative); possible SOB as anginal equivalent  PLAN  -Lasix 40 mg IV BID  -Atorvastatin 80 mg PO qD   -Home lisinopril 20mg PO BID  -PRN hydralazine  -BB once not in exacerbation   -Consider spironolactone   -A1C, lipid panel  -Strict I/Os  -Daily  weights  -Cardiac diet with 1.5L fluid restriction, 2g Na restriction   -Cardiology consult: possible LHC tomorrow if patient able to lie flat without SOB     #Acute hypoxic respiratory failure  #Chronic obstructive pulmonary disease - in exacerbation ? No PFTS [facility] file - [facility]  #Pulmonary infiltrates  #Pulmonary hypertension, class combination of class II and III  -Shortness of breath and cough for few weeks  -RVSP 39 mmHg ECHO 1/23  -Requiring 15L NC after transfer from [facility], where he required 10L NC. Able to be weaned to 5L NC today s/p LASIX   **pulm infiltrates likely fluid OL vs aspiration pneumonitis, unlikely bacterial PNA although concern for aspiration  PLAN  -Discont IV unasyn   -Discont solumedrol   -Start prednisone 40mg x 4 days (1/25-1/28)   -Azithro x 5 days (1/25-1/29)   -Scheduled benzonatate, Symbicort, Flonase, robitussin   -Scheduled q4hr albuterol nebs  -SLP eval, patient with some trouble swallowing pills at bedside  -RT consult    #NSTEMI, possibly type II although cannot r/o type I   -Patient without history of chest pain.  Does state he was told he had a heart attack a very long time ago.  -Troponin 69 ->115 -> 204  -EKG with ST depressions in anterior/inferior leads concerning for ischemia  -RADHA score 5, heart score 8  **trop likely elevated in setting of CKD to some degree as well  PLAN  -Aspirin 81mg PO qD   -Atorvastatin as above  -PRN nitroglycerin    #Chronic kidney disease, stage 3a likely due to HTN   -Admission Cr 1.54, GFR 44. Per [facility] records, BL Cr 1.3-1.9  -A1C WNL   PLAN  -FeUrea, although no LUIS FELIPE at this time   -CTM  -Renally dose all medications  -Avoid nephrotoxic agents     #Metabolic acidosis  **likely due to CKD: kidney with reduced ability to excrete excess acid  PLAN  -CTM and consider starting sodium bicarb 650 mg PO BID and titrate to normal serum bicarb concentration (23-29)    #Pyuria, asymptomatic  #Benign prostatic hyperplasia   -UA 1/23 with  many bacteria, 20-50 WBC, positive nitrites, negative leukocyte esterase-however patient without symptoms although he does have a history of self cathing 4 times daily.  PLAN  -Educate on increased straight cath times    #Anemia, normocytic   -Admission Hgb 13.7, MCV 91.9   -Iron panel and B12 WNL   -No hx of colonoscopy - check [facility] records  PLAN  -FOBT     #Leukocytosis  -Admission WBC WNL, with inc s/p steroids as above    PLAN  -Monitor for signs/ sx of infection     #Protein calorie malnutrition - BMI 14.05   -Patient with cachexia and temporal wasting on exam  PLAN  -Nutrition consult    #Tobacco use disorder  -1 PPD x60 years  PLAN  -Nicotine patch 14mg qD  -Would like to try quitting he states    -- CHRONIC AND RESOLVED HOSPITAL PROBLEMS --     #Renal mass   -Renal US 1/23 with indeterminate a 2.5 cm calcified mass in the upper pole right kidney or adjacent adrenal gland.  PLAN  -Further evaluation with CT with contrast as outpatient    #Impaired vision, left eye   #Brain mass, frontal lobe - nonoperable   -Patient only able to see shadows out of L eye.  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

## 2022-01-25 NOTE — PROGRESS NOTES
Assumed care of patient, bedside report received from noc RN. Updated on POC, call light within reach and fall precautions in place. Bed locked and in lowest position. Patient instructed to call for assistance before getting out of bed. All questions answered, no other needs at this time.     Clarified heparin with md. No IV heparin at this time. Inform MD if chest pain occurs.

## 2022-01-25 NOTE — ASSESSMENT & PLAN NOTE
Says he smokes 18 cigarettes daily.  History of COPD with active exacerbation  Nicotine patch ordered

## 2022-01-25 NOTE — NON-PROVIDER
"Daily Progress Note:     Date of Service: 1/25/2022  Primary Team: [facility] IM [facility] Team   Attending: Alberto Deluna M.D.   Senior Resident: Dr. Wang  Intern: Dr. Connelly  Contact:  [phone]    Chief Complaint:   Shortness of breath    Subjective:  Laron is an 81-yo gentleman with PMH of HTN, BPH, DLD, brain tumor, and CAD admitted for acute on chronic hypoxic respiratory failure and sepsis. He reports that he has felt progressively weaker for the past year or so, and was previously able to ambulate up to 2 miles but now can only walk a few blocks. He noticed acute worsening of his SOB and new cough in December 2021, and symptoms have been worsening up until admission. Cough is productive of sputum and he reports orthopnea and PND. He denies diarrhea, N/V, fever/chills. He is constipated at baseline and often has 1 stool every 2-3 days. He uses straight catheters 3-4x daily for urinary retention 2/2 BPH. 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 He is a current everyday smoker (~18 cigarettes) for the past 60 years. He drinks 1-2 glasses of whiskey daily and denies all other drug use.     He arrived to [facility] of 15 L O2, he is now weaned to ~5L O2 facemask after a dose of IV Lasix and saturating mid-90s. He denies abdominal pain, chest pain, dysuria, melena, and urinary urgency. He has reportedly never had a colonoscopy. He is vaccinated x2 for Covid.    Consultants/Specialty:  Cardiology    Review of Systems:   Negative except as in HPI    Social hx:  Housing: Pt lives alone in [facility] in a mobile home park.  Tobacco: 18-20 cigarettes/day x 60 years.  Drugs: Denies current & prior use  EtOH: Denies prior abuse. Currently drinks 1-2 whiskeys per night.  Relationships: Pt is close with his ex-wife, Gabrielle.  Transportation: Pt takes the bus, walks.  Mobility: Has used a cane for stability in the past.    Objective Data: "   Vitals:   Temp:  [36.2 °C (97.2 °F)-37 °C (98.6 °F)] 37 °C (98.6 °F)  Pulse:  [] 92  Resp:  [17-30] 18  BP: (106-159)/() 106/79  SpO2:  [83 %-96 %] 92 %    Dry weight: 55.3 kg    Physical exam:  Const: Pt is cachectic and frail-appearing, resting comfortably.  HEENT: Normocephalic, atraumatic. PERRLA. EOMI.  CV: Tachycardia, regular rhythm. No lower extremity edema. Mild JVD b/l. No head bobbing.   Pulm: Increased work of breathing with accessory muscle use. Diminished lung sounds throughout. Some R-sided wheezing appreciated.   GI: Abdomen is soft and nondistended. Normal bowel sounds in all 4 quadrants. Abdomen is nontender to palpation, no organomegaly.  : No suprapubic tenderness. No CVA tenderness.   Extremities: Warm, well-perfused. Skin is dry with no apparent wounds.  Neuro: Pt is A&Ox4. Speech is fluid. 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 No RAPD. Gaze is conjugate. Pt has resting physiologic tremor in all 4 extremities. He is profoundly unsteady upon standing and taking a few steps.    Labs:   WBC: 13.4  Hb 12.6    BUN/Cr 43/1.71  GFR 39    Lactic acid 1.2    Iron studies: iron 64, TIBC 272, 24% sat, unsat iron binding 272, ferritin 192    A1c 5.3    Total cholesterol 115  TAG 78  HDL 49  LDL 50    Troponin T 136 --> 204  BNP 25,788 --> 31,191    D-dimer 1.37    TSH 0.61  Free T4 1.23    Vit B12 389    UA: nitrite +, LE neg, 20-50 WBC, many bacteria    Urine cx 1/23: lactose-fermenting GNR > 100,000 cfu/mL    Imaging:     CXR 1/23: B/l lower lobe infiltrates    Echo 1/23: EF 20%, severe mitral regurg, RVSP 40, global hypokinesis w/ regional wall motion abnormalities    Renal US 1/23: 2.5 cm calcified mass in upper pole of R kidney, enlarged prostate    Problem Representation:  Laron is a pleasant 81-yo male with PMH of HTN, BPH, DLD, brain tumor, and CAD admitted for acute on chronic hypoxic respiratory failure and sepsis.     # HFrEF w/ systolic and  diastolic dysfunction  # Severe mitral regurgitation  Pt has no known HF history, however does note distant hx of MI for which he takes atorvastatin and low-dose ASA. Elevated BNP of 9045 on admission trended [place] to 14428 today, probably in the setting of poor renal function + fluid overload from HF. Clinically he is near-euvolemic, there is mild JVD present bilaterally but no LE edema. Echo 1/23 demonstrating EF 20%, severe MR, and global hypokinesis, possibly from prior ischemia vs other cardiomyopathy. With his symptomatology and echo findings, he is NYHA class III stage C w/ combined systolic and diastolic HF.    -Diuresis with Lasix 40 mg IV BID   -Monitor kidney function closely  -Hydralazine + isosorbide dinitrate in lieu of ACEi  -ACEi held for LUIS FELIPE; will likely restart in 24-48 hrs  -Holding metoprolol, spironolactone per cardiology due to acute exacerbation  -1500 mL fluid restriction, 2g sodium restriction  -Influenza, pneumococcal vaccines ordered  -Cardiology following, appreciate recs    # Elevated troponins  # EKG changes  Troponin elevated at 69 on admission, slowly trended up to 204 today. He does have ST depressions in the anterior and inferior leads suggestive of ischemia on EKG, however is asymptomatic aside from cough/SOB. RADHA 4-5, HEART score 8 indicating high risk of ischemic event. Per cardiology, he is a poor candidate for cardiac catheterization especially with his impaired kidney function, so this will be deferred until Cr improves. D-dimer also elevated, likely in the setting of ischemia.     -Continue ASA 81 mg  -Continue atorvastatin 80 mg  -Serial troponins q6h  -Continue telemetry  -May consider starting heparin gtt if anginal symptoms develop    # Sepsis  # Hypoxic respiratory failure  Pt admitted for SOB with SIRS 3/4 and qSOFA 1/3, initially requiring 15L O2. He is currently afebrile and saturating well on 4L O2 facemask after IV dose of Lasix. Vital signs stable aside from mild  tachycardia (90s-100s). Elevated WBC of 13.4 today likely 2/2 IV steroid administration. CXR showing hyperinflation & b/l lower lobe infiltrates, however this is less likely an infectious etiology due to procalcitonin of 0.06, lactate of 1.2, lack of fever. It is more likely that this is pulmonary edema 2/2 acute HF exacerbation vs. COPD exacerbation. Pt has no formal COPD diagnosis or PFTs on file, but there is high suspicion with 60 pack-year hx and pulm hyperinflation on CXR.     -Gentle fluid resuscitation with 50 mL/hr NS--discontinued  -On Unasyn + azithromycin upon admission--discontinued due to low suspicion for infectious etiology  -Continue prednisone 40 mg PO  -Continue diuresis with IV Lasix  -Tessalon, Symbicort, guaifenesin dextromethorphan, duonebs for symptomatic relief of cough  -RT protocols  -Daily CBCs  -Repeat procalcitonin q48 hrs    # LUIS FELIPE  Baseline Cr unknown, [facility] Most recent Cr prior to this hospitalization of 1.2 in 2009. Currently, BUN/Cr 43/1.71 suggestive of LUIS FELIPE w/ prerenal etiology, marc in setting of sepsis, however obstructive etiology due to BPH is also possible. UA neg for protein, blood, and casts. Renal US was unremarkable except for an undetermined 2.5 cm calcified mass on upper pole of R kidney.    -Urine electrolytes for FeUrea   -FeNa not reliable s/p diuretic use  -Daily renal, electrolyte panel  -Recommend further eval of R renal mass w/ CT contrast when renal function stabilizes    # Asymptomatic bacteriuria  Pt has hx of urinary retention due to obstruction with BPH, usually straight-caths himself 3-4x daily. He is currently asymptomatic and denies dysuria, frequency, & urgency, and there is no suprapubic or CVA tenderness on exam. UA from 1/23 showing signs of UTI w/ positive nitrites, 20-50 WBC, and many bacteria. UCx from 1/23 growing lactose-fermenting GNRs > 100,000 cfu/mL, suspect E. Coli.    -Hold off on abx for now as pt is  asymptomatic  -If symptoms develop, consider 3 days of Macrobid    # BPH w/ urinary retention  Pt regularly performs self-straight caths 3-4x daily. Straight cath this afternoon yielded 850 mL urine.     -Continue tamsulosin  -Straight catheterization as needed    # Chronic malnutrition  Pt is cachectic and frail-appearing on exam, BMI of 16.53 is severely underweight. He has lost 20-25 lbs in the past year. Pt reportedly takes a multivitamin and B12 supplementation as an outpatient, B12 level of 389. He denies hx of alcohol abuse, states that he has 1-2 glasses of whiskey per night. All other electrolytes wnl, total protein slightly decreased at 5.9, albumin 3.6, A1c 5.3%.    -Empiric folate supplementation  -Thiamine supplementation  -Daily multivitamin  -Cardiac diet  -Speech eval for swallowing; pt had difficulty with large pill this afternoon per nursing    -------------------------------------------------------  Laura Cr, MS4

## 2022-01-25 NOTE — CARE PLAN
The patient is     Shift Goals  Clinical Goals: no sob, wean O2, transfer C  Patient Goals: rest, go     Progress made toward(s) clinical / shift goals:      Patient is not progressing towards the following goals:

## 2022-01-25 NOTE — ASSESSMENT & PLAN NOTE
2.5 cm calcified mass seen on ultrasound, indeterminate.  Recommended CT with contrast for further evaluation  Hold off until LUIS FELIPE resolves

## 2022-01-25 NOTE — PROGRESS NOTES
Telemetry Shift Summary    Rhythm SR/ST  HR Range 119-127  Ectopy r-f PAC, o-fPVC  Measurement .16/.10/.36    Normal Values  Rhythm SR  HR Range   Measurement 0.12-.020 / 0.06-0.10 / 0.30-0.52

## 2022-01-25 NOTE — ASSESSMENT & PLAN NOTE
On lisinopril at home, hold due to LUIS FELIPE.  He also had low pressures while at [facility]  He will be on isosorbide dinitrate and hydralazine per cardiology

## 2022-01-25 NOTE — ASSESSMENT & PLAN NOTE
Does not appear to be volume overloaded at this time.  He does have a significantly elevated BNP which is likely due to his chronic lung disease and LUIS FELIPE  Echo showed EF of 20%, grade 2 diastolic dysfunction.  Global hypokinesis that is most pronounced inferiorly.  Severe MR, RVSP of 40  Cardiology consulted and recommended left heart cath for ischemic work-up

## 2022-01-25 NOTE — DISCHARGE SUMMARY
Discharge Summary    CHIEF COMPLAINT ON ADMISSION  Transferred from outside hospital    Reason for Admission  Heart failure exacerbation    Admission Date  1/24/2022    CODE STATUS  DNR/ DNI    HPI & HOSPITAL COURSE  This is a 81 y.o. male with past medical history of tobacco use, hyperlipidemia, benign prostatic hyperplasia, CKD stage III (baseline creatinine 1.3-1.9).  Patient was transferred from [facility] for left heart cath.  On arrival, patient presented with 3 week history of shortness of breath and wet cough    #Heart failure with reduced ejection fraction (EF 20%)  #Grade 2 diastolic dysfunction  #Severe mitral regurgitation   On arrival, patient was requiring 15 liters oxymask, troponins yadira to 204, lactic acid elevated at 2.4. Patient was determined to be volume overloaded and in heart failure exacerbation. Given his kidney history, patient was carefully diuresed. Echo 1/23 showed LVEF 20%, global hypokinesis particularly pronounced in inferiorly, Grade II diastolic dysfunction. This suggested new cardiac insult as prior echo showed global hypokinesis with LVEF 35-40%, moderate to severe mitral regurgitation, and moderate to severe pulmonary hypertension. Cardiology recommend cardiac catheterization, but patient decided against it. Patient declined all invasive procedures. Therefore, patient was optimized medically, and discharged home.    #Acute respiratory failure  #60-pack-year history of tobacco use  #Suspected COPD exacerbation, however no PFTs available  Patient has extensive tobacco smoking history. Patient was treated for COPD exacerbation and concern for developing atypical pneumonia with 5 days of prednisone, antibiotics, and duonebs.  Also, as he wanted to quit smoking, discharged with nicotine patches and gum.     #CKD stage III  Baseline Cr 1.68.  Patient's creatinine has been at baseline throughout admission, as her baseline Cr is 1.3-1.9. Patient did not develop acute kidney  injury.     [facility], so given 1 month of meds on discharge, and additional refills printed out so that patient can take them to [facility] pharmacy.     Therefore, he is discharged in good and stable condition to home with organized home healthcare and close outpatient follow-up.    The patient met 2-midnight criteria for an inpatient stay at the time of discharge.     Physical Exam  Patient with temporal wasting, appears cachectic.   Seen on RA this AM, requiring 2L NC   HENT:      Nose: Congestion present.   Eyes:      Extraocular movements intact.      Blind L eye - only able to appreciate shadows   Cardiovascular:      Normal rate and regular rhythm.      No murmur (unable to appreciate severe MR) heard. No friction rub. No gallop.    Pulmonary:      Distant breath sounds, diffuse bilaterally  Abdominal:      There is no distension.      There is no abdominal tenderness.   Genitourinary:     Comments: Neg suprapubic tendernes  Musculoskeletal:      Edema resolved     Patient requiring 2 person assist to stand      Scalene use with exertion, inc SOB but does not desat with movement      Discharge Date  1/28/2022    FOLLOW UP ITEMS POST DISCHARGE  Follow up with your [facility]  Recommend PFTs to confirm suspected COPD   Establish care with a [facility] cardiologist for heart failure management  Incidental right sided renal mass found on imaging. Given history of inoperable brain mass, recommend outpatient workup  Given severe BPH requiring self caths and elevated PSA, recommend outpatient [facility] urology referral    DISCHARGE DIAGNOSES  Principal Problem:    Acute combined systolic (congestive) and diastolic (congestive) heart failure (HCC) POA: Yes  Active Problems:    Sepsis (HCC) POA: Yes    Acute on chronic respiratory failure with hypoxia (HCC) POA: Yes    LUIS FELIPE (acute kidney injury) (HCC) POA: Yes    COPD exacerbation (HCC) POA: Yes    Primary hypertension POA: Yes    Pulmonary infiltrates POA: Yes    Dyslipidemia  POA: Yes    Benign prostatic hyperplasia with urinary retention POA: Yes    Tobacco abuse POA: Yes    Acute cystitis without hematuria POA: Yes    Renal mass, right POA: Yes    Protein calorie malnutrition (HCC) POA: Yes    Alcohol use POA: Yes    Severe mitral regurgitation POA: Yes  Resolved Problems:    * No resolved hospital problems. *    MEDICATIONS ON DISCHARGE     Medication List      START taking these medications      Instructions   albuterol 108 (90 Base) MCG/ACT Aers inhalation aerosol   Inhale 2 Puffs every 6 hours as needed for Shortness of Breath for up to 360 days.  Dose: 2 Puff     budesonide-formoterol 160-4.5 MCG/ACT Aero  Commonly known as: SYMBICORT   Inhale 1 Puff 2 times a day for 240 days.  Dose: 1 Puff     carvedilol 6.25 MG Tabs  Commonly known as: COREG   Take 1 Tablet by mouth 2 times a day with meals for 30 days.  Dose: 6.25 mg     doxycycline monohydrate 100 MG tablet  Commonly known as: ADOXA   Take 1 Tablet by mouth every 12 hours for 3 doses.  Dose: 100 mg     folic acid 1 MG Tabs  Commonly known as: FOLVITE   Take 1 Tablet by mouth every day for 360 days.  Dose: 1 mg     furosemide 40 MG Tabs  Commonly known as: LASIX   Take 0.5 Tablet by mouth every day for 30 days. Do not take this medication if you feel dizzy  Dose: 20 mg     nicotine 14 MG/24HR Pt24  Commonly known as: NICODERM   Place 1 Patch on the skin every 24 hours for 15 days.  Dose: 1 Patch     nicotine polacrilex 2 MG Gum  Commonly known as: RA Nicotine Gum   Take 1 Each by mouth as needed for Smoking Cessation for up to 15 days.  Dose: 2 mg     One-Daily Multi-Vitamin Tabs   Take 1 Tablet by mouth every day.  Dose: 1 Tablet     Spiriva HandiHaler 18 MCG Caps  Generic drug: tiotropium   Place 1 Capsule into inhaler and inhale every day for 30 days.  Dose: 18 mcg     spironolactone 25 MG Tabs  Commonly known as: ALDACTONE   Take 0.5 Tablet by mouth every day.  Dose: 12.5 mg     thiamine 100 MG tablet  Commonly known as:  THIAMINE   Take 1 Tablet by mouth every day for 360 days.  Dose: 100 mg        CONTINUE taking these medications      Instructions   aspirin EC 81 MG Tbec  Commonly known as: ECOTRIN   Take 81 mg by mouth every evening.  Dose: 81 mg     atorvastatin 40 MG Tabs  Commonly known as: LIPITOR   Take 40 mg by mouth every day.  Dose: 40 mg     lisinopril 20 MG Tabs  Commonly known as: PRINIVIL   Take 20 mg by mouth 2 times a day.  Dose: 20 mg     tamsulosin 0.4 MG capsule  Commonly known as: FLOMAX   Take 0.4 mg by mouth every evening.  Dose: 0.4 mg     VITAMIN C PO   Take 2 Tablets by mouth every evening.  Dose: 2 Tablet          Allergies  No Known Allergies    DIET  No orders of the defined types were placed in this encounter.    ACTIVITY  As tolerated.  Weight bearing as tolerated    CONSULTATIONS  Pulmonology   Cardiology     PROCEDURES  N/A    LABORATORY  Lab Results   Component Value Date    SODIUM 140 01/28/2022    POTASSIUM 4.8 01/28/2022    CHLORIDE 103 01/28/2022    CO2 24 01/28/2022    GLUCOSE 95 01/28/2022    BUN 63 (H) 01/28/2022    CREATININE 1.78 (H) 01/28/2022    CREATININE 1.2 03/28/2009        Lab Results   Component Value Date    WBC 9.9 01/26/2022    HEMOGLOBIN 12.1 (L) 01/26/2022    HEMATOCRIT 35.7 (L) 01/26/2022    PLATELETCT 189 01/26/2022      Total time of the discharge process exceeds 30 minutes.

## 2022-01-25 NOTE — ASSESSMENT & PLAN NOTE
History of COPD, does not appear to be on home inhalers.  Says he does have home oxygen but only uses it as needed  Currently requiring 10 L on my evaluation.  His mask is off his face and he frequently takes it off it seems like  Due to COPD exacerbation, significant wheezing on exam  He does have CHF but does not appear to be volume overloaded at this time  See COPD exacerbation
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
## 2022-01-25 NOTE — ASSESSMENT & PLAN NOTE
Says he has 1 shot of whiskey daily but no more than that.  Denies history of withdrawal.  His BMI is low so that may be too much for him, counseled on cessation  Thiamine, folate, multivitamin  Monitor for withdrawal

## 2022-01-25 NOTE — PROGRESS NOTES
Pt transferred via stretcher to regional room T819,  Report called to jerome,  All drips off for transport also notified nurse to turn on when he arrives there,  o2 at 10 L no complaints noted

## 2022-01-25 NOTE — CONSULTS
"History & Physical Note    Date of Admission: 1/25/2022  Admission Status: Inpatient  [facility] Team: R Cardiology Team  Attending: Dr. Sloan  Senior Resident: Dr. Rodriguez  Contact Number: [phone]    Chief Complaint: Worsening shortness of breath     History of Present Illness (HPI):   Mr. 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       Per patient, he notes he was hospitalized 3 years ago for \"shortness of breath\". Then 2 years ago (during the start of the pandemic), patient notes his exercise tolerance was worsening in terms of climbing stairs to his appartment and unable to carry with him 2 bags of groceries. He then notes slowly he was having more shortness of breath but was able to tolerate and do his ADLs and IADLs. He has no issues walking on flat surface and walks at least 2 miles to ride the bus system. He notes however in the last month or so his shortness of breath was worsening and uses oxygen prn. But last night he was having shortness of breath while in bed so he lenin 911. He does have baseline cough, He denies any fever, chills and malaise. He also has no leg swelling he can recall. He takes his medications and follows up with his primary [facility] the [facility] Arce. Patient visited [facility] ED and was transferred for coronary angiogram based on his echocardiogram of Echocardiogram showed severely reduced LV systolic dysfunction, EF 20% global hypokinesis w/ severe mitral regurgitation--functional, related to restricted movement of the posterior leaflet.       At the ED, VS were 119/83, , Satting at 96% on 10 L. WBC ct was " 14.4, hgb 17.0, Crea was 1.49, CXR showing b/l lower lobe predominant pulmonary infiltrate and no large pleural effusion.     Review of Systems:   Review of Systems   Constitutional: Negative for diaphoresis and fever.   HENT: Negative for sore throat.    Respiratory: Positive for cough, sputum production and shortness of breath. Negative for wheezing.    Cardiovascular: Negative for chest pain, palpitations, orthopnea, claudication and leg swelling.   Neurological: Negative for dizziness, sensory change, focal weakness, weakness and headaches.   Psychiatric/Behavioral: Negative for depression.       Past Medical History:   Past Medical History was reviewed with patient.   has a past medical history of Benign tumor of brain (HCC).    Past Surgical History: Past Surgical History was reviewed with patient.   has a past surgical history that includes other orthopedic surgery.    Medications: Medications have been reviewed with patient.  Prior to Admission Medications   Prescriptions Last Dose Informant Patient Reported? Taking?   Ascorbic Acid (VITAMIN C PO) 1/21/2022 at 0900 Patient Yes No   Sig: Take 2 Tablets by mouth every evening.   aspirin EC (ECOTRIN) 81 MG Tablet Delayed Response 1/24/2022 at 0900 Patient Yes No   Sig: Take 81 mg by mouth every evening.   atorvastatin (LIPITOR) 40 MG Tab 1/21/2022 at 0900 Patient's Home Pharmacy Yes No   Sig: Take 40 mg by mouth every day.   lisinopril (PRINIVIL) 20 MG Tab 1/21/2022 at 0900 Patient's Home Pharmacy Yes No   Sig: Take 20 mg by mouth 2 times a day.   tamsulosin (FLOMAX) 0.4 MG capsule 1/21/2022 at 0900 Patient's Home Pharmacy Yes No   Sig: Take 0.4 mg by mouth every evening.      Facility-Administered Medications: None        Allergies: Allergies have been reviewed with patient.  No Known Allergies    Family History: none on file  family history is not on file.     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     Appearance: Normal appearance. He is not ill-appearing.   Eyes:      General: No scleral icterus.  Cardiovascular:      Rate and Rhythm: Tachycardia present.      Pulses: Normal pulses.      Heart sounds: Murmur heard.        Comments: Mildly tachycardic, regular rhythm, has JVD,   Pulmonary:      Effort: Pulmonary effort is normal.      Comments: Right sided crackles, left side decreased breath sounds, upper lung field normal breath sounds. No rhonchi or rales.  Abdominal:      General: Abdomen is flat. Bowel sounds are normal. There is no distension.      Palpations: Abdomen is soft.   Musculoskeletal:      Right lower leg: No edema.      Left lower leg: No edema.   Neurological:      Mental Status: He is alert and oriented to person, place, and time.      Sensory: No sensory deficit.      Comments: Reactive EMILIE on both eyes however left eye only seeing shadows.    Psychiatric:         Mood and Affect: Mood normal.         Behavior: Behavior normal.         Thought Content: Thought content normal.         Judgment: Judgment normal.         Labs:   WBC 13.4  hgb 12.6  HCO3 19  Crea 1.71 <<1.77 <1.54 <1.49    EKG: Showing sinus tachycardia, w/ new LBBB compared to last ekg,  Multiple PVCs irregular rhythm.     Imaging:   CXR showing b/l lower lobe predominant pulmonary infiltrate, no cardiomegaly    Echocardiogram showed severely reduced LV  systolic dysfunction, EF 20% global hypokinesis w/ severe mitral regurgitation--functional, related to restricted movement of the posterior leaflet.    Previous Data Review: reviewed    Problem Representation:  Chas is a 80 y/o M w/ hx of CAD, HTN who was admitted here for workup of clinical heart failure dilated cardiomyopathy possibly from EtOH and or ischemic cardiomyopathy and found to have severe mitral regurgitation--functional due to restricted movement of the posterior leaflet.    #dilated cardiomyopathy HFrEF 20%  #hx of CAD per chart  Likely non-ischemic and/or ischemic, currently volume overloaded with elevating NT-proBNP    Plan  --pending coronary angiogram until LUIS FELIPE improves  -hold metoprolol per primary team  -continue afterload reducer: isosorbide dinitrate and hydralzaine for LUIS FELIPE and would need to be switched to ACEi/ARBs or ARNI when able per creatinine  -start diuresis to improve clinical heart failure   -hold spironolactone 25 mg for now until Luis Felipe resolves  -continue atorvastatin   -continue ASA 81mg qdaily  -start SGLT if permitting     #severe mitral valve regurgitation     Plan  -continue afterload reducer: isosorbide dinitrate and hydralzaine for LUIS FELIPE and would need to be switched to ACEi/ARBs or ARNI when able per creatinine  -pending coronary angiogram     #LUIS FELIPE  Likely cardiorenal type II  Unsure if pt has CKD, [facility]    Plan  ---per primary team management  -start diuresis   -continue afterload reducer: isosorbide dinitrate and hydralzaine for LUIS FELIPE and would need to be switched to ACEi/ARBs or ARNI when able per creatinine  -start diuresis to improve clinical heart failure  -hold spironolactone 25 mg for now until Luis Felipe resolves  -doing coronary angiogram once LUIS FELIPE improves.

## 2022-01-25 NOTE — CARE PLAN
Problem: Nutritional:  Goal: Achieve adequate nutritional intake  Description: Patient will consume >50% of meals and supplements  Outcome: Not Met   See RD note.

## 2022-01-25 NOTE — ASSESSMENT & PLAN NOTE
Noted on CXR on admission. Procalcitonin low  Concerning for CHF or aspiration pneumonitis  Management as per above

## 2022-01-25 NOTE — PROGRESS NOTES
Med rec completed per med rec tech at [facility]    Med rec updated and complete, per pt and [phone]  Allergies reviewed, per pt  [facility] ATORVASTATIN 40MG on 2/8/2021 for 30 day supply, LISINOPRIL 20MG 1 tablet BID on 2/8/2021 for 30 day supply, TAMSULOSIN 0.4MG on 4/19/2021 for 90 day supply, and AMLODIPINE 10MG on 2/18/2021 for 30 day supply, per pt has stopped this medication and has not taken any in over 5-6 months.  Pt reports that he has taking his medications with in this month.

## 2022-01-25 NOTE — PROGRESS NOTES
Hospital Medicine Daily Progress Note    Date of Service  1/24/2022    Chief Complaint  Laron Doherty is a 81 y.o. male admitted 1/23/2022 with dyspnea.     Hospital Course  81-year-old male  with a past medical history of tobacco use, dyslipidemia on atorvastatin, CAD on ASA, BPH on tamsulosin hypertension on lisinopril and amlodipine, and [facility] for acute hypoxic respiratory failure requiring presents emergency department via EMS for sudden onset worsening dyspnea.  In the ER patient tachycardic, tachypneic and hypoxic requiring 4 L nasal cannula.  Patient found to have LUIS FELIPE and sepsis started on IV antibiotics.  Also noted to be in COPD exacerbation secondary to pneumonia in the bilateral lower lobes started on steroids and SVNs.  UA also positive for UTI, patient has a history of straight cathing himself around 2-3 times a day.  Renal ultrasound showed indeterminate 2.5 cm calcified mass in the upper pole the right kidney recommend further evaluation with CT with contrast, will need to wait for renal improvement.  Echocardiogram showed severely reduced EF 20%, grade 2 diastolic dysfunction, and severe mitral regurgitation that is functional related to restricted movement of the posterior leaflet.     Interval Problem Update  Cardiology was consulted, discussed with Dr. Stovall who recommends left heart cath and transfer to [facility]. Pending transfer, unsure if bed available today. Patient reported improvement in dyspnea. On 4 L this morning. Duonebs dc for tachycardia. Verified code status DNR/DNI with the patient, states he does not want surgery but is amenable to cardiac cath. Renal function worsening, avoiding IV fluids in setting of heart failure, encourage PO intake no signs of fluid overload.     I have personally seen and examined the patient at bedside. I discussed the plan of care with patient, bedside RN, charge RN,  and  cardiology.    Consultants/Specialty  cardiology    Code Status  DNAR/DNI    Disposition  Patient is not medically cleared for discharge.   Anticipate discharge to to a critical access hospital.  I have placed the appropriate orders for post-discharge needs.    Review of Systems  Review of Systems   Constitutional: Positive for malaise/fatigue. Negative for chills and fever.   Respiratory: Positive for cough and shortness of breath.    Cardiovascular: Negative for chest pain and leg swelling.   Gastrointestinal: Negative for abdominal pain, nausea and vomiting.   Genitourinary: Negative for dysuria.   Musculoskeletal: Negative for myalgias.   Skin: Negative for rash.   Neurological: Negative for dizziness and headaches.   Psychiatric/Behavioral: Negative for depression.   All other systems reviewed and are negative.       Physical Exam  Temp:  [36.3 °C (97.3 °F)-36.6 °C (97.9 °F)] 36.3 °C (97.3 °F)  Pulse:  [102-130] 123  Resp:  [16-30] 30  BP: (126-159)/() 143/92  SpO2:  [83 %-96 %] 88 %    Physical Exam  Vitals and nursing note reviewed.   Constitutional:       General: He is not in acute distress.     Appearance: He is cachectic. He is ill-appearing.   HENT:      Head: Normocephalic and atraumatic.   Eyes:      Conjunctiva/sclera: Conjunctivae normal.      Pupils: Pupils are equal, round, and reactive to light.   Cardiovascular:      Rate and Rhythm: Regular rhythm. Tachycardia present.      Pulses: Normal pulses.   Pulmonary:      Effort: Pulmonary effort is normal. No respiratory distress.      Breath sounds: No wheezing or rales.      Comments: On NC   Decreased breath sounds  Abdominal:      General: Abdomen is flat. There is no distension.      Palpations: Abdomen is soft.      Tenderness: There is no abdominal tenderness.   Musculoskeletal:         General: Normal range of motion.      Cervical back: Normal range of motion and neck supple.      Right lower leg: No edema.      Left lower leg: No edema.    Skin:     General: Skin is warm and dry.      Findings: No rash.   Neurological:      General: No focal deficit present.      Mental Status: He is alert and oriented to person, place, and time.      Cranial Nerves: No cranial nerve deficit.      Motor: Weakness present.   Psychiatric:         Mood and Affect: Mood normal.         Behavior: Behavior normal.         Fluids    Intake/Output Summary (Last 24 hours) at 1/24/2022 1738  Last data filed at 1/24/2022 0900  Gross per 24 hour   Intake --   Output 350 ml   Net -350 ml       Laboratory  Recent Labs     01/23/22  0700 01/24/22  0218   WBC 14.4* 8.1   RBC 5.58 4.47*   HEMOGLOBIN 17.0 13.7*   HEMATOCRIT 52.9* 41.1*   MCV 94.8 91.9   MCH 30.5 30.6   MCHC 32.1* 33.3*   RDW 49.0 47.3   PLATELETCT 270 198   MPV 11.3 11.6     Recent Labs     01/23/22  0700 01/24/22  0218 01/24/22  1337   SODIUM 139 139 140   POTASSIUM 4.2 4.4 4.6   CHLORIDE 105 106 107   CO2 20 18* 22   GLUCOSE 188* 141* 123*   BUN 27* 30* 37*   CREATININE 1.49* 1.54* 1.77*   CALCIUM 9.3 8.9 8.8                   Imaging  EC-ECHOCARDIOGRAM COMPLETE W/ CONT   Final Result      US-RENAL   Final Result         Indeterminate a 2.5 cm calcified mass in the upper pole right kidney or adjacent adrenal gland. Further evaluation with CT with contrast is recommended.      Enlarged prostate.         DX-CHEST-PORTABLE (1 VIEW)   Final Result      Bilateral lower lobe predominant pulmonary infiltrate.           Assessment/Plan  * Sepsis (HCC)- (present on admission)  Assessment & Plan  This is Sepsis Present on admission  SIRS criteria identified on my evaluation include: Tachycardia, with heart rate greater than 90 BPM, Tachypnea, with respirations greater than 20 per minute and Leukocytosis, with WBC greater than 12,000  Source is lung  Sepsis protocol initiated  Fluid resuscitation ordered per protocol  IV antibiotics as appropriate for source of sepsis  While organ dysfunction may be noted elsewhere in this  problem list or in the chart, degree of organ dysfunction does not meet CMS criteria for severe sepsis    SIRS 3/4 and qSOFA 1/3  History of alcohol consumption concerning for possible aspiration  History of BPH/straight cath concerning for possible urinary tract etiologies  Viral panel negative and procalcitonin low  Procalcitonin low  Continue IV antibiotics for now  renal ultrasound- no obstruction  Follow-up blood cultures    Acute combined systolic (congestive) and diastolic (congestive) heart failure (HCC)- (present on admission)  Assessment & Plan  Unclear hx, pt reports some cardiac history but cannot elaborate   Cardiology consulted, rec transfer to [facility] for cardiac cath       BPH (benign prostatic hyperplasia)- (present on admission)  Assessment & Plan  Continue home tamsulosin and self-catheterization  Pending urinalysis and bladder scan  Pending renal ultrasound  Monitor    Elevated troponin- (present on admission)  Assessment & Plan  Trops 60s->110s  Likely secondary to demand ischemia in the setting of AHRF/Sepsis  Reported history of CAD  CAD not R/O  Continue home aspirin and statin for now  Hold nebs for now  Trend troponins  Echo noted    Dyslipidemia- (present on admission)  Assessment & Plan  Continue home statin for now, elevated troponins and possibly worsening CAD, pending echo  Reevaluated prior to discharge, questionable benefit on a 81 years old    Pulmonary infiltrates- (present on admission)  Assessment & Plan  Noted on chest x-ray  Procalcitonin low  Concerning for CHF, or aspiration  Management as per above    Primary hypertension- (present on admission)  Assessment & Plan  Hold antihypertensives in the setting of sepsis  As needed antihypertensives    Lactic acidosis- (present on admission)  Assessment & Plan  Lactic acid 2.4 -> 2.5  Secondary to sepsis  As needed IV bolus  resolved    COPD exacerbation (HCC)- (present on admission)  Assessment & Plan  History of chronic smoking  along with barrel chest chest x-ray suggestive of COPD  [facility] and at the moment we dont have records to prove PFT diagnosis  Pt needs outpatient PFTs  Worsening dyspnea along with productive cough and amount of sputum  Procalcitonin low  duonebs held for tachycardia   IV steroids  Azithromycin for anti-inflammatory benefits  Up to chair for all meals  Pulmonary consult    Acute renal failure (ARF) (HCC)- (present on admission)  Assessment & Plan  Etiology is unclear  Sepsis and malnutrition concerning for prerenal   BUN/creatinine ratio 18  Urine electrolytes  Avoid nephrotoxin  Renal dose meds  Encourage PO intake   If worsens may need nephrology consult    Acute respiratory failure with hypoxia (HCC)- (present on admission)  Assessment & Plan  Requiring 4 L nasal cannula to maintain saturations  Chronic history of tobacco use and CAD  Chest x-ray with bilateral lower lobe infiltrates and barrel chest  DDx: Sepsis/pneumonia, COPD exacerbation, CHF, malignancy  Continue management for sepsis and COPD exacerbation  Pending echocardiogram  Frequent incentive spirometer  Up to chair for meals  Titrate oxygen as tolerable  [facility]  Pulmonary consulted    Leukocytosis- (present on admission)  Assessment & Plan  Secondary to sepsis  IV antibiotics for now  Labs on a.m.       VTE prophylaxis: SCDs/TEDs and heparin ppx    I have performed a physical exam and reviewed and updated ROS and Plan today (1/24/2022). In review of yesterday's note (1/23/2022), there are no changes except as documented above.

## 2022-01-25 NOTE — CONSULTS
Cardiology Consult Note:    Tyler Sloan M.D.  Date & Time note created:    1/25/2022   9:52 AM     Referring MD:  Dr. Deluna    Patient ID:   Name:             Laron Doherty     YOB: 1940  Age:                 81 y.o.  male   MRN:               [id number]                                                             Reason for Consult:      New onset CHF    History of Present Illness:    81-year-old male with no known past medical history of cardiovascular disease but does have a 60-pack-year smoking history.  He was admitted for worsening shortness of breath.  During this time he is found to have an EF of 20% and an elevated BNP with positive troponin.  Because of this he was transferred from [facility] to here.  Overnight he was mildly hypotensive and in acute renal failure with a creatinine of 1.5.  He was given IV saline his creatinine is worsened to 1.8 and he is now developing a cough and shortness of breath.  He was also started on metoprolol.  He denies prior chest pain.  He does endorse worsening exertional shortness of breath.    Review of Systems:      Constitutional: Denies fevers, Denies weight changes  Eyes: Denies changes in vision, no eye pain  Ears/Nose/Throat/Mouth: Denies nasal congestion or sore throat   Cardiovascular: no chest pain, no palpitations   Respiratory: + shortness of breath , Denies cough  Gastrointestinal/Hepatic: Denies abdominal pain, nausea, vomiting, diarrhea, constipation or GI bleeding   Genitourinary: Denies dysuria or frequency  Musculoskeletal/Rheum: Denies  joint pain and swelling, no edema  Skin: Denies rash  Neurological: Denies headache, confusion, memory loss or focal weakness/parasthesias  Psychiatric: denies mood disorder   Endocrine: Janice thyroid problems  Heme/Oncology/Lymph Nodes: Denies enlarged lymph nodes, denies brusing or known bleeding disorder  All other systems were reviewed and are negative (AMA/CMS criteria)                Past  Medical History:   Past Medical History:   Diagnosis Date   • Benign tumor of brain (HCC)      Active Hospital Problems    Diagnosis    • Acute cystitis without hematuria [N30.00]    • Renal mass, right [N28.89]    • Protein calorie malnutrition (HCC) [E46]    • Alcohol use [Z72.89]    • Severe mitral regurgitation [I34.0]    • Tobacco abuse [Z72.0]    • Acute combined systolic (congestive) and diastolic (congestive) heart failure (HCC) [I50.41]    • Sepsis (HCC) [A41.9]    • Dyslipidemia [E78.5]    • Primary hypertension [I10]    • Benign prostatic hyperplasia with urinary retention [N40.1, R33.8]    • COPD exacerbation (HCC) [J44.1]    • LUIS FELIPE (acute kidney injury) (HCC) [N17.9]    • Acute on chronic respiratory failure with hypoxia ([facility]) [J96.21]    • Pulmonary infiltrates [R91.8]        Past Surgical History:  Past Surgical History:   Procedure Laterality Date   • OTHER ORTHOPEDIC SURGERY         Hospital Medications:  Current Facility-Administered Medications   Medication Dose   • thiamine (Vitamin B-1) tablet 100 mg  100 mg   • folic acid (FOLVITE) tablet 1 mg  1 mg   • multivitamin (THERAGRAN) tablet 1 Tablet  1 Tablet   • nitroglycerin (NITROSTAT) tablet 0.4 mg  0.4 mg   • atorvastatin (LIPITOR) tablet 80 mg  80 mg   • hydrALAZINE (APRESOLINE) tablet 25 mg  25 mg   • heparin injection 5,000 Units  5,000 Units   • guaiFENesin dextromethorphan (ROBITUSSIN DM) 100-10 MG/5ML syrup 10 mL  10 mL   • hydrALAZINE (APRESOLINE) injection 10 mg  10 mg   • ondansetron (ZOFRAN ODT) dispertab 4 mg  4 mg   • ondansetron (ZOFRAN) syringe/vial injection 4 mg  4 mg   • senna-docusate (PERICOLACE or SENOKOT S) 8.6-50 MG per tablet 2 Tablet  2 Tablet    And   • polyethylene glycol/lytes (MIRALAX) PACKET 1 Packet  1 Packet    And   • magnesium hydroxide (MILK OF MAGNESIA) suspension 30 mL  30 mL    And   • bisacodyl (DULCOLAX) suppository 10 mg  10 mg   • acetaminophen (Tylenol) tablet 650 mg  650 mg   • Respiratory Therapy  Consult     • ampicillin/sulbactam (UNASYN) 3 g in  mL IVPB  3 g   • influenza Vac High-Dose Quad (Fluzone) injection 0.7 mL  0.7 mL   • methylPREDNISolone sod succ (SOLU-MEDROL) 125 MG injection 125 mg  125 mg   • pneumococcal vaccine (PNEUMOVAX-23) injection 25 mcg  0.5 mL   • furosemide (LASIX) injection 40 mg  40 mg   • [Held by provider] lisinopril (PRINIVIL) tablet 20 mg  20 mg   • aspirin EC (ECOTRIN) tablet 81 mg  81 mg   • tamsulosin (FLOMAX) capsule 0.4 mg  0.4 mg   • ipratropium-albuterol (DUONEB) nebulizer solution  3 mL   • budesonide-formoterol (SYMBICORT) 160-4.5 MCG/ACT inhaler 2 Puff  2 Puff   • isosorbide dinitrate (ISORDIL) tablet 20 mg  20 mg   • hydrOXYzine HCl (ATARAX) tablet 25 mg  25 mg   • nicotine (NICODERM) 14 MG/24HR 14 mg  14 mg   • LORazepam (ATIVAN) tablet 0.5 mg  0.5 mg         Current Outpatient Medications:  Medications Prior to Admission   Medication Sig Dispense Refill Last Dose   • tamsulosin (FLOMAX) 0.4 MG capsule Take 0.4 mg by mouth every evening.   1/21/2022 at 0900   • aspirin EC (ECOTRIN) 81 MG Tablet Delayed Response Take 81 mg by mouth every evening.   1/24/2022 at 0900   • Ascorbic Acid (VITAMIN C PO) Take 2 Tablets by mouth every evening.   1/21/2022 at 0900   • lisinopril (PRINIVIL) 20 MG Tab Take 20 mg by mouth 2 times a day.   1/21/2022 at 0900   • atorvastatin (LIPITOR) 40 MG Tab Take 40 mg by mouth every day.   1/21/2022 at 0900       Medication Allergy:  No Known Allergies    Family History:  No family history on file.    Social History:  Social History     Socioeconomic History   • Marital status:      Spouse name: Not on file   • Number of children: Not on file   • Years of education: Not on file   • Highest education level: Not on file   Occupational History   • Not on file   Tobacco Use   • Smoking status: Current Every Day Smoker   • Smokeless tobacco: Not on file   Substance and Sexual Activity   • Alcohol use: Yes   • Drug use: No   • Sexual  activity: Not on file   Other Topics Concern   • Not on file   Social History Narrative   • Not on file     Social Determinants of Health     Financial Resource Strain:    • Difficulty of Paying Living Expenses: Not on file   Food Insecurity:    • Worried About Running Out of Food in the Last Year: Not on file   • Ran Out of Food in the Last Year: Not on file   Transportation Needs:    • Lack of Transportation (Medical): Not on file   • Lack of Transportation (Non-Medical): Not on file   Physical Activity:    • Days of Exercise per Week: Not on file   • Minutes of Exercise per Session: Not on file   Stress:    • Feeling of Stress : Not on file   Social Connections:    • Frequency of Communication with Friends and Family: Not on file   • Frequency of Social Gatherings with Friends and Family: Not on file   • Attends [employer] Services: Not on file   • Active Member of Clubs or Organizations: Not on file   • Attends Club or Organization Meetings: Not on file   • Marital Status: Not on file   Intimate Partner Violence:    • Fear of Current or Ex-Partner: Not on file   • Emotionally Abused: Not on file   • Physically Abused: Not on file   • Sexually Abused: Not on file   Housing Stability:    • Unable to Pay for Housing in the Last Year: Not on file   • Number of Places Lived in the Last Year: Not on file   • Unstable Housing in the Last Year: Not on file         Physical Exam:  Vitals/ General Appearance:   Weight/BMI: Body mass index is 16.53 kg/m².  /84   Pulse 99   Temp 36.3 °C (97.3 °F) (Temporal)   Resp 20   Ht 1.829 m (6')   Wt 55.3 kg (121 lb 14.6 oz)   SpO2 95%   Vitals:    01/25/22 0613 01/25/22 0710 01/25/22 0851 01/25/22 0900   BP:  106/79 131/84    Pulse: 92  99    Resp: 18  20    Temp:   36.3 °C (97.3 °F)    TempSrc:   Temporal    SpO2: 92%  95%    Weight:    55.3 kg (121 lb 14.6 oz)   Height:         Oxygen Therapy:  Pulse Oximetry: 95 %, O2 (LPM): 7, O2 Delivery Device:  Oxymask    Constitutional:   Well developed, Well nourished, No acute distress  HENMT:  Normocephalic, Atraumatic, Oropharynx moist mucous membranes, No oral exudates, Nose normal.  No thyromegaly.  Eyes:  EOMI, Conjunctiva normal, No discharge.  Neck:  Normal range of motion, No cervical tenderness,  no JVD.  Cardiovascular:  Normal heart rate, Normal rhythm, No murmurs, No rubs, No gallops.   Extremitites with intact distal pulses, no cyanosis, or edema.  Lungs:  Normal breath sounds, breath sounds clear to auscultation bilaterally,  no crackles, no wheezing.   Abdomen: Bowel sounds normal, Soft, No tenderness, No guarding, No rebound, No masses, No hepatosplenomegaly.  Skin: Warm, Dry, No erythema, No rash, no induration.  Neurologic: Alert & oriented x 3, No focal deficits noted, cranial nerves II through X are grossly intact.  Psychiatric: Affect normal, Judgment normal, Mood normal.      MDM (Data Review):     Records reviewed and summarized in current documentation    Lab Data Review:  Recent Results (from the past 24 hour(s))   TROPONIN    Collection Time: 01/24/22  1:37 PM   Result Value Ref Range    Troponin T 135 (H) 6 - 19 ng/L   LACTIC ACID    Collection Time: 01/24/22  1:37 PM   Result Value Ref Range    Lactic Acid 1.1 0.5 - 2.0 mmol/L   Comp Metabolic Panel    Collection Time: 01/24/22  1:37 PM   Result Value Ref Range    Sodium 140 135 - 145 mmol/L    Potassium 4.6 3.6 - 5.5 mmol/L    Chloride 107 96 - 112 mmol/L    Co2 22 20 - 33 mmol/L    Anion Gap 11.0 7.0 - 16.0    Glucose 123 (H) 65 - 99 mg/dL    Bun 37 (H) 8 - 22 mg/dL    Creatinine 1.77 (H) 0.50 - 1.40 mg/dL    Calcium 8.8 8.4 - 10.2 mg/dL    AST(SGOT) 29 12 - 45 U/L    ALT(SGPT) 11 2 - 50 U/L    Alkaline Phosphatase 91 30 - 99 U/L    Total Bilirubin 0.4 0.1 - 1.5 mg/dL    Albumin 3.6 3.2 - 4.9 g/dL    Total Protein 5.9 (L) 6.0 - 8.2 g/dL    Globulin 2.3 1.9 - 3.5 g/dL    A-G Ratio 1.6 g/dL   ESTIMATED GFR    Collection Time: 01/24/22  1:37  PM   Result Value Ref Range    GFR If African American 45 (A) >60 mL/min/1.73 m 2    GFR If Non  37 (A) >60 mL/min/1.73 m 2   TROPONIN    Collection Time: 01/24/22  7:21 PM   Result Value Ref Range    Troponin T 136 (H) 6 - 19 ng/L   TSH    Collection Time: 01/25/22  1:06 AM   Result Value Ref Range    TSH 0.610 0.380 - 5.330 uIU/mL   FREE THYROXINE    Collection Time: 01/25/22  1:06 AM   Result Value Ref Range    Free T-4 1.23 0.93 - 1.70 ng/dL   Basic Metabolic Panel (BMP)    Collection Time: 01/25/22  1:06 AM   Result Value Ref Range    Sodium 135 135 - 145 mmol/L    Potassium 4.2 3.6 - 5.5 mmol/L    Chloride 105 96 - 112 mmol/L    Co2 19 (L) 20 - 33 mmol/L    Glucose 106 (H) 65 - 99 mg/dL    Bun 43 (H) 8 - 22 mg/dL    Creatinine 1.71 (H) 0.50 - 1.40 mg/dL    Calcium 8.6 8.5 - 10.5 mg/dL    Anion Gap 11.0 7.0 - 16.0   CBC without Differential    Collection Time: 01/25/22  1:06 AM   Result Value Ref Range    WBC 13.4 (H) 4.8 - 10.8 K/uL    RBC 4.10 (L) 4.70 - 6.10 M/uL    Hemoglobin 12.6 (L) 14.0 - 18.0 g/dL    Hematocrit 37.4 (L) 42.0 - 52.0 %    MCV 91.2 81.4 - 97.8 fL    MCH 30.7 27.0 - 33.0 pg    MCHC 33.7 33.7 - 35.3 g/dL    RDW 47.9 35.9 - 50.0 fL    Platelet Count 198 164 - 446 K/uL    MPV 12.1 9.0 - 12.9 fL   ESTIMATED GFR    Collection Time: 01/25/22  1:06 AM   Result Value Ref Range    GFR If  47 (A) >60 mL/min/1.73 m 2    GFR If Non  39 (A) >60 mL/min/1.73 m 2   RETICULOCYTES COUNT    Collection Time: 01/25/22  1:06 AM   Result Value Ref Range    Reticulocyte Count 1.1 0.8 - 2.1 %    Retic, Absolute 0.05 0.04 - 0.06 M/uL    Imm. Reticulocyte Fraction 11.7 9.3 - 17.4 %    Retic Hgb Equivalent 33.6 29.0 - 35.0 pg/cell   IRON/TOTAL IRON BIND    Collection Time: 01/25/22  1:06 AM   Result Value Ref Range    Iron 64 50 - 180 ug/dL    Total Iron Binding 272 250 - 450 ug/dL    Unsat Iron Binding 208 110 - 370 ug/dL    % Saturation 24 15 - 55 %   FERRITIN     Collection Time: 01/25/22  1:06 AM   Result Value Ref Range    Ferritin 192.0 22.0 - 322.0 ng/mL   HEMOGLOBIN A1C    Collection Time: 01/25/22  1:06 AM   Result Value Ref Range    Glycohemoglobin 5.3 4.0 - 5.6 %    Est Avg Glucose 105 mg/dL   Lipid Profile    Collection Time: 01/25/22  1:06 AM   Result Value Ref Range    Cholesterol,Tot 115 100 - 199 mg/dL    Triglycerides 78 0 - 149 mg/dL    HDL 49 >=40 mg/dL    LDL 50 <100 mg/dL   proBrain Natriuretic Peptide, NT    Collection Time: 01/25/22  1:06 AM   Result Value Ref Range    NT-proBNP 59140 (H) 0 - 125 pg/mL   VITAMIN B12    Collection Time: 01/25/22  7:46 AM   Result Value Ref Range    Vitamin B12 -True Cobalamin 389 211 - 911 pg/mL   SARS CoV-2 Ab, Total    Collection Time: 01/25/22  7:46 AM   Result Value Ref Range    SARS-CoV-2 Ab Qual Non-Reactive Non-Reactive   LACTIC ACID    Collection Time: 01/25/22  7:46 AM   Result Value Ref Range    Lactic Acid 1.5 0.5 - 2.0 mmol/L   APTT    Collection Time: 01/25/22  7:46 AM   Result Value Ref Range    APTT 32.1 24.7 - 36.0 sec   Heparin Anti-Xa    Collection Time: 01/25/22  7:46 AM   Result Value Ref Range    Heparin Xa (UFH) <0.10 IU/mL       Imaging/Procedures Review:    Chest Xray:  Reviewed    EKG:   Dated 1/23/2022 personally viewed under myself showing sinus tachycardia with PVCs with a left branch block.  QRS noted be 124 ms.    ECHO:  Dated 1/23/2022 personally viewed inter myself showing an EF of 20% grade 2 diastolic dysfunction.  Severe mitral regurgitation noted.    MDM (Assessment and Plan):     Active Hospital Problems    Diagnosis    • Acute cystitis without hematuria [N30.00]    • Renal mass, right [N28.89]    • Protein calorie malnutrition (HCC) [E46]    • Alcohol use [Z72.89]    • Severe mitral regurgitation [I34.0]    • Tobacco abuse [Z72.0]    • Acute combined systolic (congestive) and diastolic (congestive) heart failure (HCC) [I50.41]    • Sepsis (HCC) [A41.9]    • Dyslipidemia [E78.5]    •  Primary hypertension [I10]    • Benign prostatic hyperplasia with urinary retention [N40.1, R33.8]    • COPD exacerbation (HCC) [J44.1]    • LUIS FELIPE (acute kidney injury) ([facility]) [N17.9]    • Acute on chronic respiratory failure with hypoxia ([facility]) [J96.21]    • Pulmonary infiltrates [R91.8]      81-year-old male with new onset heart failure and mitral regurgitation in the setting of cardiorenal syndrome.  At this point I think that he is inappropriately beta-blocker which I will stop his metoprolol.  I will also start him on low-dose of diuretics to decongest him based on his JVD.  I think he is probably in type II cardiorenal syndrome.  Please continue the hydralazine and Imdur.  I would anticipate starting an ACE or an arm within 24 to 48 hours.  We will hold spironolactone for now.  He is not a good candidate for cardiac catheterization given his inability to lie flat and bradycardia as well as his creatinine.    1. CHFrEF    - stop metoprolol    - cont hydral/isordil    - hold ACE/ARB/ARNI for now until Cr stable    - hold jorge alberto    - start lasix 40 IV BID    - will need ischemic workup, cath preferred but defer until Cr improves        2. CKD, type 2 Cardio-renal    - per above    3. COPD    - defer    4. HLD    - cont atorva

## 2022-01-25 NOTE — ASSESSMENT & PLAN NOTE
Per echo this admit: Severe mitral regurgitation- functional, related to restricted movement   [facility], get records to review prior echos if available

## 2022-01-26 ENCOUNTER — PATIENT OUTREACH (OUTPATIENT)
Dept: HEALTH INFORMATION MANAGEMENT | Facility: OTHER | Age: 82
End: 2022-01-26

## 2022-01-26 LAB
ALBUMIN SERPL BCP-MCNC: 3.5 G/DL (ref 3.2–4.9)
ALBUMIN/GLOB SERPL: 1.7 G/DL
ALP SERPL-CCNC: 72 U/L (ref 30–99)
ALT SERPL-CCNC: 23 U/L (ref 2–50)
ANION GAP SERPL CALC-SCNC: 14 MMOL/L (ref 7–16)
AST SERPL-CCNC: 36 U/L (ref 12–45)
BACTERIA UR CULT: ABNORMAL
BACTERIA UR CULT: ABNORMAL
BASOPHILS # BLD AUTO: 0.1 % (ref 0–1.8)
BASOPHILS # BLD: 0.01 K/UL (ref 0–0.12)
BILIRUB SERPL-MCNC: 0.2 MG/DL (ref 0.1–1.5)
BUN SERPL-MCNC: 52 MG/DL (ref 8–22)
CALCIUM SERPL-MCNC: 8.2 MG/DL (ref 8.5–10.5)
CHLORIDE SERPL-SCNC: 109 MMOL/L (ref 96–112)
CO2 SERPL-SCNC: 20 MMOL/L (ref 20–33)
CREAT SERPL-MCNC: 1.68 MG/DL (ref 0.5–1.4)
EOSINOPHIL # BLD AUTO: 0 K/UL (ref 0–0.51)
EOSINOPHIL NFR BLD: 0 % (ref 0–6.9)
ERYTHROCYTE [DISTWIDTH] IN BLOOD BY AUTOMATED COUNT: 48 FL (ref 35.9–50)
GLOBULIN SER CALC-MCNC: 2.1 G/DL (ref 1.9–3.5)
GLUCOSE SERPL-MCNC: 105 MG/DL (ref 65–99)
HCT VFR BLD AUTO: 35.7 % (ref 42–52)
HGB BLD-MCNC: 12.1 G/DL (ref 14–18)
IMM GRANULOCYTES # BLD AUTO: 0.09 K/UL (ref 0–0.11)
IMM GRANULOCYTES NFR BLD AUTO: 0.9 % (ref 0–0.9)
LYMPHOCYTES # BLD AUTO: 0.35 K/UL (ref 1–4.8)
LYMPHOCYTES NFR BLD: 3.5 % (ref 22–41)
MAGNESIUM SERPL-MCNC: 2.5 MG/DL (ref 1.5–2.5)
MCH RBC QN AUTO: 30.9 PG (ref 27–33)
MCHC RBC AUTO-ENTMCNC: 33.9 G/DL (ref 33.7–35.3)
MCV RBC AUTO: 91.1 FL (ref 81.4–97.8)
MONOCYTES # BLD AUTO: 0.86 K/UL (ref 0–0.85)
MONOCYTES NFR BLD AUTO: 8.7 % (ref 0–13.4)
NEUTROPHILS # BLD AUTO: 8.58 K/UL (ref 1.82–7.42)
NEUTROPHILS NFR BLD: 86.8 % (ref 44–72)
NRBC # BLD AUTO: 0 K/UL
NRBC BLD-RTO: 0 /100 WBC
PLATELET # BLD AUTO: 189 K/UL (ref 164–446)
PMV BLD AUTO: 11.5 FL (ref 9–12.9)
POTASSIUM SERPL-SCNC: 3.8 MMOL/L (ref 3.6–5.5)
PROT SERPL-MCNC: 5.6 G/DL (ref 6–8.2)
RBC # BLD AUTO: 3.92 M/UL (ref 4.7–6.1)
SIGNIFICANT IND 70042: ABNORMAL
SITE SITE: ABNORMAL
SODIUM SERPL-SCNC: 143 MMOL/L (ref 135–145)
SOURCE SOURCE: ABNORMAL
TROPONIN T SERPL-MCNC: 209 NG/L (ref 6–19)
URATE SERPL-MCNC: 10.5 MG/DL (ref 2.5–8.3)
WBC # BLD AUTO: 9.9 K/UL (ref 4.8–10.8)

## 2022-01-26 PROCEDURE — 84550 ASSAY OF BLOOD/URIC ACID: CPT

## 2022-01-26 PROCEDURE — 700101 HCHG RX REV CODE 250: Performed by: INTERNAL MEDICINE

## 2022-01-26 PROCEDURE — A9270 NON-COVERED ITEM OR SERVICE: HCPCS | Performed by: STUDENT IN AN ORGANIZED HEALTH CARE EDUCATION/TRAINING PROGRAM

## 2022-01-26 PROCEDURE — 94760 N-INVAS EAR/PLS OXIMETRY 1: CPT

## 2022-01-26 PROCEDURE — 94669 MECHANICAL CHEST WALL OSCILL: CPT

## 2022-01-26 PROCEDURE — 700111 HCHG RX REV CODE 636 W/ 250 OVERRIDE (IP): Performed by: STUDENT IN AN ORGANIZED HEALTH CARE EDUCATION/TRAINING PROGRAM

## 2022-01-26 PROCEDURE — 99233 SBSQ HOSP IP/OBS HIGH 50: CPT | Mod: FS | Performed by: NURSE PRACTITIONER

## 2022-01-26 PROCEDURE — 700102 HCHG RX REV CODE 250 W/ 637 OVERRIDE(OP): Performed by: STUDENT IN AN ORGANIZED HEALTH CARE EDUCATION/TRAINING PROGRAM

## 2022-01-26 PROCEDURE — 99406 BEHAV CHNG SMOKING 3-10 MIN: CPT

## 2022-01-26 PROCEDURE — 700111 HCHG RX REV CODE 636 W/ 250 OVERRIDE (IP): Performed by: INTERNAL MEDICINE

## 2022-01-26 PROCEDURE — 87040 BLOOD CULTURE FOR BACTERIA: CPT

## 2022-01-26 PROCEDURE — 36415 COLL VENOUS BLD VENIPUNCTURE: CPT

## 2022-01-26 PROCEDURE — 770020 HCHG ROOM/CARE - TELE (206)

## 2022-01-26 PROCEDURE — 97162 PT EVAL MOD COMPLEX 30 MIN: CPT

## 2022-01-26 PROCEDURE — 83735 ASSAY OF MAGNESIUM: CPT

## 2022-01-26 PROCEDURE — 700102 HCHG RX REV CODE 250 W/ 637 OVERRIDE(OP): Performed by: INTERNAL MEDICINE

## 2022-01-26 PROCEDURE — 94640 AIRWAY INHALATION TREATMENT: CPT

## 2022-01-26 PROCEDURE — A9270 NON-COVERED ITEM OR SERVICE: HCPCS | Performed by: INTERNAL MEDICINE

## 2022-01-26 PROCEDURE — 99233 SBSQ HOSP IP/OBS HIGH 50: CPT | Mod: GC | Performed by: INTERNAL MEDICINE

## 2022-01-26 PROCEDURE — A9270 NON-COVERED ITEM OR SERVICE: HCPCS | Performed by: NURSE PRACTITIONER

## 2022-01-26 PROCEDURE — 84484 ASSAY OF TROPONIN QUANT: CPT

## 2022-01-26 PROCEDURE — 92610 EVALUATE SWALLOWING FUNCTION: CPT

## 2022-01-26 PROCEDURE — 51798 US URINE CAPACITY MEASURE: CPT

## 2022-01-26 PROCEDURE — 80053 COMPREHEN METABOLIC PANEL: CPT

## 2022-01-26 PROCEDURE — 700102 HCHG RX REV CODE 250 W/ 637 OVERRIDE(OP): Performed by: NURSE PRACTITIONER

## 2022-01-26 PROCEDURE — 85025 COMPLETE CBC W/AUTO DIFF WBC: CPT

## 2022-01-26 PROCEDURE — 99221 1ST HOSP IP/OBS SF/LOW 40: CPT | Mod: GC | Performed by: STUDENT IN AN ORGANIZED HEALTH CARE EDUCATION/TRAINING PROGRAM

## 2022-01-26 PROCEDURE — 94664 DEMO&/EVAL PT USE INHALER: CPT

## 2022-01-26 PROCEDURE — 700101 HCHG RX REV CODE 250: Performed by: STUDENT IN AN ORGANIZED HEALTH CARE EDUCATION/TRAINING PROGRAM

## 2022-01-26 PROCEDURE — 97166 OT EVAL MOD COMPLEX 45 MIN: CPT

## 2022-01-26 RX ORDER — FLUTICASONE PROPIONATE 50 MCG
2 SPRAY, SUSPENSION (ML) NASAL DAILY
Qty: 16 G | Refills: 3 | OUTPATIENT
Start: 2022-01-27

## 2022-01-26 RX ORDER — NICOTINE 21 MG/24HR
1 PATCH, TRANSDERMAL 24 HOURS TRANSDERMAL EVERY 24 HOURS
Qty: 30 PATCH | Refills: 3 | OUTPATIENT
Start: 2022-01-26

## 2022-01-26 RX ORDER — LANOLIN ALCOHOL/MO/W.PET/CERES
100 CREAM (GRAM) TOPICAL DAILY
Qty: 30 TABLET | Refills: 3 | OUTPATIENT
Start: 2022-01-27

## 2022-01-26 RX ORDER — IPRATROPIUM BROMIDE AND ALBUTEROL SULFATE 2.5; .5 MG/3ML; MG/3ML
3 SOLUTION RESPIRATORY (INHALATION)
Status: DISCONTINUED | OUTPATIENT
Start: 2022-01-26 | End: 2022-01-27

## 2022-01-26 RX ORDER — FUROSEMIDE 40 MG/1
40 TABLET ORAL DAILY
Qty: 30 TABLET | Refills: 3 | OUTPATIENT
Start: 2022-01-27

## 2022-01-26 RX ORDER — LISINOPRIL 20 MG/1
20 TABLET ORAL TWICE DAILY
Status: DISCONTINUED | OUTPATIENT
Start: 2022-01-26 | End: 2022-01-28 | Stop reason: HOSPADM

## 2022-01-26 RX ORDER — CARVEDILOL 3.12 MG/1
3.12 TABLET ORAL 2 TIMES DAILY WITH MEALS
Status: DISCONTINUED | OUTPATIENT
Start: 2022-01-26 | End: 2022-01-26

## 2022-01-26 RX ORDER — POTASSIUM CHLORIDE 20 MEQ/1
20 TABLET, EXTENDED RELEASE ORAL ONCE
Status: COMPLETED | OUTPATIENT
Start: 2022-01-26 | End: 2022-01-26

## 2022-01-26 RX ORDER — AZITHROMYCIN 250 MG/1
500 TABLET, FILM COATED ORAL DAILY
Status: DISCONTINUED | OUTPATIENT
Start: 2022-01-27 | End: 2022-01-26

## 2022-01-26 RX ORDER — FUROSEMIDE 40 MG/1
40 TABLET ORAL
Status: DISCONTINUED | OUTPATIENT
Start: 2022-01-27 | End: 2022-01-26

## 2022-01-26 RX ORDER — HEPARIN SODIUM 5000 [USP'U]/ML
5000 INJECTION, SOLUTION INTRAVENOUS; SUBCUTANEOUS EVERY 8 HOURS
Status: DISCONTINUED | OUTPATIENT
Start: 2022-01-26 | End: 2022-01-28 | Stop reason: HOSPADM

## 2022-01-26 RX ORDER — FUROSEMIDE 40 MG/1
40 TABLET ORAL
Status: DISCONTINUED | OUTPATIENT
Start: 2022-01-26 | End: 2022-01-27

## 2022-01-26 RX ORDER — CARVEDILOL 6.25 MG/1
6.25 TABLET ORAL 2 TIMES DAILY WITH MEALS
Status: DISCONTINUED | OUTPATIENT
Start: 2022-01-26 | End: 2022-01-28 | Stop reason: HOSPADM

## 2022-01-26 RX ORDER — DOXYCYCLINE 100 MG/1
100 TABLET ORAL EVERY 12 HOURS
Status: DISCONTINUED | OUTPATIENT
Start: 2022-01-27 | End: 2022-01-27

## 2022-01-26 RX ORDER — ATORVASTATIN CALCIUM 80 MG/1
80 TABLET, FILM COATED ORAL DAILY
Qty: 30 TABLET | Refills: 3 | OUTPATIENT
Start: 2022-01-27

## 2022-01-26 RX ORDER — FOLIC ACID 1 MG/1
1 TABLET ORAL DAILY
Qty: 30 TABLET | Refills: 3 | OUTPATIENT
Start: 2022-01-27

## 2022-01-26 RX ORDER — SPIRONOLACTONE 25 MG/1
12.5 TABLET ORAL DAILY
Qty: 30 TABLET | Refills: 3 | OUTPATIENT
Start: 2022-01-27

## 2022-01-26 RX ORDER — SPIRONOLACTONE 25 MG/1
12.5 TABLET ORAL
Status: DISCONTINUED | OUTPATIENT
Start: 2022-01-26 | End: 2022-01-27

## 2022-01-26 RX ORDER — AZITHROMYCIN 250 MG/1
250 TABLET, FILM COATED ORAL DAILY
Status: DISCONTINUED | OUTPATIENT
Start: 2022-01-27 | End: 2022-01-27

## 2022-01-26 RX ORDER — BUDESONIDE AND FORMOTEROL FUMARATE DIHYDRATE 160; 4.5 UG/1; UG/1
2 AEROSOL RESPIRATORY (INHALATION) 2 TIMES DAILY
Qty: 1 EACH | Refills: 3 | OUTPATIENT
Start: 2022-01-26

## 2022-01-26 RX ADMIN — BUDESONIDE AND FORMOTEROL FUMARATE DIHYDRATE 2 PUFF: 160; 4.5 AEROSOL RESPIRATORY (INHALATION) at 06:39

## 2022-01-26 RX ADMIN — POTASSIUM CHLORIDE 20 MEQ: 1500 TABLET, EXTENDED RELEASE ORAL at 08:02

## 2022-01-26 RX ADMIN — SENNOSIDES AND DOCUSATE SODIUM 2 TABLET: 50; 8.6 TABLET ORAL at 04:51

## 2022-01-26 RX ADMIN — BUDESONIDE AND FORMOTEROL FUMARATE DIHYDRATE 2 PUFF: 160; 4.5 AEROSOL RESPIRATORY (INHALATION) at 19:28

## 2022-01-26 RX ADMIN — FUROSEMIDE 40 MG: 10 INJECTION, SOLUTION INTRAMUSCULAR; INTRAVENOUS at 04:53

## 2022-01-26 RX ADMIN — IPRATROPIUM BROMIDE AND ALBUTEROL SULFATE 3 ML: .5; 2.5 SOLUTION RESPIRATORY (INHALATION) at 14:44

## 2022-01-26 RX ADMIN — BENZONATATE 100 MG: 100 CAPSULE ORAL at 13:19

## 2022-01-26 RX ADMIN — IPRATROPIUM BROMIDE AND ALBUTEROL SULFATE 3 ML: .5; 2.5 SOLUTION RESPIRATORY (INHALATION) at 19:48

## 2022-01-26 RX ADMIN — ASPIRIN 81 MG: 81 TABLET, COATED ORAL at 05:07

## 2022-01-26 RX ADMIN — SPIRONOLACTONE 12.5 MG: 25 TABLET ORAL at 13:36

## 2022-01-26 RX ADMIN — HEPARIN SODIUM 5000 UNITS: 5000 INJECTION, SOLUTION INTRAVENOUS; SUBCUTANEOUS at 13:19

## 2022-01-26 RX ADMIN — THERA TABS 1 TABLET: TAB at 04:51

## 2022-01-26 RX ADMIN — GUAIFENESIN AND DEXTROMETHORPHAN 10 ML: 100; 10 SYRUP ORAL at 13:20

## 2022-01-26 RX ADMIN — FUROSEMIDE 40 MG: 10 INJECTION, SOLUTION INTRAMUSCULAR; INTRAVENOUS at 16:13

## 2022-01-26 RX ADMIN — GUAIFENESIN AND DEXTROMETHORPHAN 10 ML: 100; 10 SYRUP ORAL at 17:59

## 2022-01-26 RX ADMIN — IPRATROPIUM BROMIDE AND ALBUTEROL SULFATE 3 ML: .5; 2.5 SOLUTION RESPIRATORY (INHALATION) at 06:26

## 2022-01-26 RX ADMIN — CARVEDILOL 6.25 MG: 6.25 TABLET, FILM COATED ORAL at 17:59

## 2022-01-26 RX ADMIN — ATORVASTATIN CALCIUM 80 MG: 80 TABLET, FILM COATED ORAL at 04:51

## 2022-01-26 RX ADMIN — LISINOPRIL 20 MG: 20 TABLET ORAL at 05:07

## 2022-01-26 RX ADMIN — TAMSULOSIN HYDROCHLORIDE 0.4 MG: 0.4 CAPSULE ORAL at 18:00

## 2022-01-26 RX ADMIN — CARVEDILOL 3.12 MG: 3.12 TABLET, FILM COATED ORAL at 08:03

## 2022-01-26 RX ADMIN — PREDNISONE 40 MG: 20 TABLET ORAL at 04:51

## 2022-01-26 RX ADMIN — BENZONATATE 100 MG: 100 CAPSULE ORAL at 17:59

## 2022-01-26 RX ADMIN — FUROSEMIDE 40 MG: 40 TABLET ORAL at 23:05

## 2022-01-26 RX ADMIN — Medication 100 MG: at 04:51

## 2022-01-26 RX ADMIN — GUAIFENESIN AND DEXTROMETHORPHAN 10 ML: 100; 10 SYRUP ORAL at 04:52

## 2022-01-26 RX ADMIN — IPRATROPIUM BROMIDE AND ALBUTEROL SULFATE 3 ML: .5; 2.5 SOLUTION RESPIRATORY (INHALATION) at 11:45

## 2022-01-26 RX ADMIN — HEPARIN SODIUM 5000 UNITS: 5000 INJECTION, SOLUTION INTRAVENOUS; SUBCUTANEOUS at 23:05

## 2022-01-26 RX ADMIN — NICOTINE 14 MG: 14 PATCH TRANSDERMAL at 04:50

## 2022-01-26 RX ADMIN — LISINOPRIL 20 MG: 20 TABLET ORAL at 17:59

## 2022-01-26 RX ADMIN — FLUTICASONE PROPIONATE 100 MCG: 50 SPRAY, METERED NASAL at 05:07

## 2022-01-26 RX ADMIN — FOLIC ACID 1 MG: 1 TABLET ORAL at 04:52

## 2022-01-26 RX ADMIN — SENNOSIDES AND DOCUSATE SODIUM 2 TABLET: 50; 8.6 TABLET ORAL at 17:59

## 2022-01-26 RX ADMIN — BENZONATATE 100 MG: 100 CAPSULE ORAL at 05:07

## 2022-01-26 ASSESSMENT — PAIN DESCRIPTION - PAIN TYPE: TYPE: ACUTE PAIN

## 2022-01-26 ASSESSMENT — LIFESTYLE VARIABLES: PACK_YEARS: >100 CIGARETTES

## 2022-01-26 ASSESSMENT — ENCOUNTER SYMPTOMS
NAUSEA: 0
WEAKNESS: 0
COUGH: 1
ABDOMINAL DISTENTION: 0
PALPITATIONS: 0
LIGHT-HEADEDNESS: 0
ABDOMINAL PAIN: 0
GASTROINTESTINAL NEGATIVE: 1
HEADACHES: 0
ORTHOPNEA: 1
NEUROLOGICAL NEGATIVE: 1
DIARRHEA: 0
CHILLS: 0
FATIGUE: 0
BLOOD IN STOOL: 0
CHEST TIGHTNESS: 0
SHORTNESS OF BREATH: 1
SHORTNESS OF BREATH: 0
SORE THROAT: 0
VOMITING: 0
DIZZINESS: 0
FEVER: 0
SPUTUM PRODUCTION: 0

## 2022-01-26 ASSESSMENT — COGNITIVE AND FUNCTIONAL STATUS - GENERAL
CLIMB 3 TO 5 STEPS WITH RAILING: A LITTLE
STANDING UP FROM CHAIR USING ARMS: A LITTLE
SUGGESTED CMS G CODE MODIFIER DAILY ACTIVITY: CJ
SUGGESTED CMS G CODE MODIFIER MOBILITY: CJ
DRESSING REGULAR LOWER BODY CLOTHING: A LITTLE
HELP NEEDED FOR BATHING: A LITTLE
WALKING IN HOSPITAL ROOM: A LITTLE
MOBILITY SCORE: 21
DAILY ACTIVITIY SCORE: 21
TOILETING: A LITTLE

## 2022-01-26 ASSESSMENT — ACTIVITIES OF DAILY LIVING (ADL): TOILETING: INDEPENDENT

## 2022-01-26 ASSESSMENT — GAIT ASSESSMENTS
DEVIATION: DECREASED BASE OF SUPPORT;BRADYKINETIC
DISTANCE (FEET): 20
GAIT LEVEL OF ASSIST: CONTACT GUARD ASSIST
ASSISTIVE DEVICE: FRONT WHEEL WALKER

## 2022-01-26 ASSESSMENT — FIBROSIS 4 INDEX: FIB4 SCORE: 3.22

## 2022-01-26 NOTE — PROGRESS NOTES
"Cardiology Follow Up Progress Note    Date of Service  1/26/2022    Attending Physician  Alberto Deluna M.D.    Chief Complaint   New CHF      HPI  Laron Doherty is a 81 y.o. male admitted 1/24/2022 with per Dr. Sloan, \"no known past medical history of cardiovascular disease but does have a 60-pack-year smoking history.  He was admitted for worsening shortness of breath.  During this time he is found to have an EF of 20% and an elevated BNP with positive troponin.  Because of this he was transferred from [facility] to here.  Overnight he was mildly hypotensive and in acute renal failure with a creatinine of 1.5.  He was given IV saline his creatinine is worsened to 1.8 and he is now developing a cough and shortness of breath.  He was also started on metoprolol.  He denies prior chest pain.  He does endorse worsening exertional shortness of breath.\"    Interim Events  1/26/2022: No overnight cardiac events. Tele monitoring personally interpreted by me shows ST. VSS; 5 L NC; Daily weight 55.3 kg. UOP -2.6L Labs reviewed; H+H 12.1+35.7-continues to downtrend. K- 3.8, BUN/Crt- 52/1.68. Discussion with patient regarding his wishes for conservative approach. This is acceptable and will continue to optimize his medical therapy per below.     Review of Systems  Review of Systems   Constitutional: Negative for fatigue and fever.   Respiratory: Negative for chest tightness and shortness of breath.    Cardiovascular: Negative for chest pain, palpitations and leg swelling.   Gastrointestinal: Negative for abdominal distention, abdominal pain and blood in stool.   Genitourinary: Negative for dysuria.   Neurological: Negative for dizziness, syncope, weakness and light-headedness.       Vital signs in last 24 hours  Temp:  [36 °C (96.8 °F)-36.9 °C (98.5 °F)] 36.7 °C (98 °F)  Pulse:  [] 73  Resp:  [16-18] 17  BP: (103-121)/(58-74) 110/71  SpO2:  [91 %-95 %] 91 %    Physical Exam  Physical Exam  Vitals and nursing note " reviewed.   Constitutional:       General: He is not in acute distress.  Cardiovascular:      Rate and Rhythm: Regular rhythm. Tachycardia present.      Pulses: Normal pulses.      Heart sounds: Normal heart sounds.   Pulmonary:      Effort: Pulmonary effort is normal. No respiratory distress.      Breath sounds: Normal breath sounds.   Abdominal:      General: Bowel sounds are normal.      Palpations: Abdomen is soft.   Musculoskeletal:         General: No swelling.      Cervical back: Normal range of motion.      Right lower leg: No edema.      Left lower leg: No edema.   Skin:     General: Skin is warm and dry.   Neurological:      General: No focal deficit present.      Mental Status: He is alert and oriented to person, place, and time. Mental status is at baseline.   Psychiatric:         Mood and Affect: Mood normal.         Behavior: Behavior normal.         Lab Review  Lab Results   Component Value Date/Time    WBC 9.9 01/26/2022 02:55 AM    RBC 3.92 (L) 01/26/2022 02:55 AM    HEMOGLOBIN 12.1 (L) 01/26/2022 02:55 AM    HEMATOCRIT 35.7 (L) 01/26/2022 02:55 AM    MCV 91.1 01/26/2022 02:55 AM    MCH 30.9 01/26/2022 02:55 AM    MCHC 33.9 01/26/2022 02:55 AM    MPV 11.5 01/26/2022 02:55 AM      Lab Results   Component Value Date/Time    SODIUM 143 01/26/2022 02:55 AM    POTASSIUM 3.8 01/26/2022 02:55 AM    CHLORIDE 109 01/26/2022 02:55 AM    CO2 20 01/26/2022 02:55 AM    GLUCOSE 105 (H) 01/26/2022 02:55 AM    BUN 52 (H) 01/26/2022 02:55 AM    CREATININE 1.68 (H) 01/26/2022 02:55 AM    CREATININE 1.2 03/28/2009 03:00 PM      Lab Results   Component Value Date/Time    ASTSGOT 36 01/26/2022 02:55 AM    ALTSGPT 23 01/26/2022 02:55 AM     Lab Results   Component Value Date/Time    CHOLSTRLTOT 115 01/25/2022 01:06 AM    LDL 50 01/25/2022 01:06 AM    HDL 49 01/25/2022 01:06 AM    TRIGLYCERIDE 78 01/25/2022 01:06 AM    TROPONINT 209 (H) 01/26/2022 02:55 AM       Recent Labs     01/24/22  0753 01/25/22  0106   NTPROBNP  [id number]* [id number]*       Cardiac Imaging and Procedures Review  EKG:  My personal interpretation of the EKG dated 1/25/2022 is ST    Echocardiogram (1/23/2022):  Severely reduced left ventricular systolic function.  Global hypokinesis - most pronounced inferiorly.  Normal right ventricular size and systolic function.  Severe mitral regurgitation- functional, related to restricted movement   of the posterior leaflet  Normal inferior vena cava size and inspiratory collapse.  Grade II diastolic dysfunction.    Cardiac Catheterization:  NA    Imaging  Chest X-Ray (1/23/2022):  Bilateral lower lobe predominant pulmonary infiltrate.     Stress Test:  NA    Assessment/Plan  Acute Decompensated  HFrEF, Stage C, Class III, LVEF 20%:  -Heart failure due to possible ischemic  -ADHF Trajectory check: improving d/t UOP -2.6L; DW 55 kg; Likely dry wt unknown   -ACE-I/ARB/ARNI: Continue lisinopril 20 mg BID  -Evidence Based Beta-blocker: Start coreg  -Aldosterone Antagonist: hold until improved kidney function  -Diuretic: continue lasix  -SGLT2i: consider at discharge if no barriers  -Patient is not a candidate for ICD placement at this time due to <3 months GDMT  -Labs: daily BMP  -Continue Daily weights; Strict I+O’s:   -PNA and Flu vaccine: ordered by pharmacy per protocol; not given  -Meds-to-beds and HF instructions on discharge   -FU in HF clinic. Schedule HF FU once DC ETA has been established.    HTN  -Controlled  -Medications per above    Reported CAD with remote stents; HLD  -ASA  -atorvastatin 80  -BB per above    Acute on CKD  -Crt 1.68-stable  -CTM    Acute respiratory failure; tobacco abuse  - 5L NC  -per primary    Thank you for allowing me to participate in the care of this patient.  Cardiology will sign off on this patient. Schedule HF FU once DC ETA has been established.    Please contact me with any questions.    MADHAVI WrightRALDA.   [facility]  [phone]

## 2022-01-26 NOTE — PROGRESS NOTES
4 Eyes Skin Assessment Completed by MER lara and MER mullen.    Head WDL  Ears Redness and Blanching  Nose WDL  Mouth Redness  Neck WDL  Breast/Chest WDL  Shoulder Blades Redness and Blanching  Spine Redness and Blanching  (R) Arm/Elbow/Hand Redness and Blanching  (L) Arm/Elbow/Hand Redness and Blanching  Abdomen WDL  Groin WDL  Scrotum/Coccyx/Buttocks Redness and Blanching  (R) Leg WDL  (L) Leg WDL  (R) Heel/Foot/Toe Redness and Blanching  (L) Heel/Foot/Toe Redness and Blanching          Devices In Places Tele Box      Interventions In Place Gray Ear Foams, Pillows and Pressure Redistribution Mattress    Possible Skin Injury No    Pictures Uploaded Into Epic N/A  Wound Consult Placed N/A  RN Wound Prevention Protocol Ordered Yes

## 2022-01-26 NOTE — NON-PROVIDER
Daily Progress Note:     Date of Service: 1/26/2022  Primary Team: [facility] Team   Attending: Alberto Deluna M.D.   Senior Resident: Dr. Wang  Intern: Dr. Connelly  Contact:  [phone]    Chief Complaint:   Shortness of breath    Subjective:  Laron is an 81-yo gentleman with PMH of HTN, BPH, DLD, brain tumor, and CAD admitted for acute on chronic hypoxic respiratory failure and sepsis. No acute events overnight, however on examination this am he was tachycardic to the 150s with an irregular heart rhythm. He states that his breathing and coughing has improved, saturating well on 5L. Denying CP, dysuria, urinary frequency, LE edema. He is anxious this am about his ex-wife Gabrielle, he states that her hearing is bad so she often does not hear her phone and she is probably worried about him.     Consultants/Specialty:  Cardiology    Review of Systems:   Negative except as in HPI    Social hx:  Housing: Pt lives alone in [place] [place] in a mobile home park.  Tobacco: 18-20 cigarettes/day x 60 years.  Drugs: Denies current & prior use  EtOH: Denies prior abuse. Currently drinks 1-2 whiskeys per night.  Relationships: Pt is close with his ex-wife, Gabrielle.  Transportation: Pt takes the bus, walks.  Mobility: Has used a cane for stability in the past.    Objective Data:   Vitals:   Temp:  [36 °C (96.8 °F)-36.9 °C (98.5 °F)] 36.9 °C (98.5 °F)  Pulse:  [] 97  Resp:  [16-20] 18  BP: (103-131)/(58-84) 110/72  SpO2:  [91 %-95 %] 94 %    Dry weight: 55.3 kg    Physical exam:  Const: Pt is cachectic and frail-appearing, resting comfortably.  HEENT: Normocephalic, atraumatic. PERRLA. EOMI.  CV: Tachycardia, irregular rhythm, no gallops or murmurs heard. No lower extremity edema. Mild JVD b/l. No head bobbing.   Pulm: Increased work of breathing with accessory muscle use. Diminished lung sounds throughout. Some R-sided wheezing appreciated.   GI: Abdomen is soft and nondistended. Normal bowel sounds in all 4 quadrants.  Abdomen is nontender to palpation, no organomegaly.  : No suprapubic tenderness. No CVA tenderness.   Extremities: Warm, well-perfused. Skin is dry with no apparent wounds. There is significant muscle wasting throughout.  Neuro: Pt is A&Ox4. Speech is fluid. 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 No RAPD. Gaze is conjugate. Pt has resting physiologic tremor in all 4 extremities. He is profoundly unsteady upon sitting, standing, and taking a few steps.    Labs:   WBC: 9.9  Hb 12.1    BUN/Cr 52/1.68  GFR 39    Lactic acid 1.4    Iron studies: iron 64, TIBC 272, 24% sat, unsat iron binding 272, ferritin 192    A1c 5.3 %    Total cholesterol 115  TAG 78  HDL 49  LDL 50    Troponin T 136 --> 204 --> 209  BNP 25,788 --> 31,191    D-dimer 1.37    TSH 0.61  Free T4 1.23    Vit B12 389    UA: nitrite +, LE neg, 20-50 WBC, many bacteria    Urine cx 1/23: Klebsiella oxytoca + Raoultella ornithinolytica > 100,000 CFU    Imaging:     CXR 1/23: B/l lower lobe infiltrates    Echo 1/23: EF 20%, severe mitral regurg, RVSP 40, global hypokinesis w/ regional wall motion abnormalities    Renal US 1/23: 2.5 cm calcified mass in upper pole of R kidney, enlarged prostate    Problem Representation:  Laron is a pleasant 81-yo male with PMH of HTN, BPH, DLD, brain tumor, and CAD admitted for acute on chronic hypoxic respiratory failure, sepsis, and possible ACS.     # HFrEF w/ systolic and diastolic dysfunction  # Severe mitral regurgitation  Pt has no known HF history, however does note distant hx of MI for which he takes atorvastatin and low-dose ASA. Elevated BNP of 9045 on admission trended up to 85946, probably in the setting of poor renal function + fluid overload from HF. Clinically he is near-euvolemic, there is mild JVD present bilaterally but no LE edema. Echo 1/23 demonstrating EF 20%, severe MR, and global hypokinesis, possibly from prior ischemia vs other cardiomyopathy. With his  symptomatology and echo findings, he is NYHA class III stage C w/ combined systolic and diastolic HF.    -Diuresis with Lasix 40 mg IV BID   -Monitor kidney function closely  -Lisinopril restarted  -Carvedilol 6.25 mg BID  -1500 mL fluid restriction, 2g sodium restriction  -Influenza, pneumococcal vaccines ordered  -Cardiology to see as an outpatient, signed off    # Elevated troponins  # EKG changes  Troponin elevated at 69 on admission, slowly trended up to 204, stable today at 209. He does have ST depressions in the anterior and inferior leads suggestive of ischemia on EKG, however is asymptomatic aside from cough/SOB. RADHA 4-5, HEART score 8 indicating high risk of ischemic event. Per cardiology, he is a poor candidate for cardiac catheterization especially with his impaired kidney function, so this will be deferred until Cr improves. D-dimer also elevated, likely in the setting of ischemia.     -Continue ASA 81 mg  -Continue atorvastatin 80 mg  -F/u with cardiology as an outpt    # Sepsis  # Hypoxic respiratory failure  Pt admitted for SOB with SIRS 3/4 and qSOFA 1/3, initially requiring 15L O2. He is currently afebrile and saturating well on 5L O2 facemask after IV dose of Lasix. Vital signs stable aside from mild tachycardia (90s-100s). WBC normal at 9.9 today. CXR showing hyperinflation & b/l lower lobe infiltrates, however this is less likely an infectious etiology due to procalcitonin of 0.06, lactate of 1.4, lack of fever. It is more likely that this is pulmonary edema 2/2 acute HF exacerbation vs. COPD exacerbation. Pt has no formal COPD diagnosis or PFTs on file, but there is high suspicion with 60 pack-year hx and pulm hyperinflation on CXR.     -Continue prednisone 40 mg PO  -Transition to PO Lasix as tolerated  -Tessalon, Symbicort, guaifenesin dextromethorphan, duonebs for symptomatic relief of cough  -RT protocols    # LUIS FELIPE  # Chronic kidney disease  Pt has known hx of CKD w/ baseline Cr of 1.5-1.9.  Currently, BUN/Cr 52/1.68 are around his baseline, increasing BUN likely reflective of IV lasix and diuresis. UA neg for protein, blood, and casts. Renal US was unremarkable except for an undetermined 2.5 cm calcified mass on upper pole of R kidney.    -Urine electrolytes for FeUrea   -FeNa not reliable s/p diuretic use  -Daily renal, electrolyte panel  -Recommend further eval of R renal mass w/ CT contrast when stable    # Asymptomatic bacteriuria  Pt has hx of urinary retention due to obstruction with BPH, usually straight-caths himself 3-4x daily. He is currently asymptomatic and denies dysuria, frequency, & urgency, and there is no suprapubic or CVA tenderness on exam. UA from 1/23 showing signs of UTI w/ positive nitrites, 20-50 WBC, and many bacteria. UCx from 1/23 growing Klebsiella oxytoca and Raoultella ornithinolytica, the latter of which is associated with renal cysts and malignancies in case reports. Susceptibilities pending.     -Hold off on abx for now as pt is asymptomatic  -If symptoms develop, consider 3 days TMP-SMX   -Follow-up on renal mass with PCP    # BPH w/ urinary retention  Pt regularly performs self-straight caths 3-4x daily. No dysuria, hematuria, or increased urgency.     -Continue tamsulosin  -Straight catheterization as needed    # Chronic malnutrition  Pt is cachectic and frail-appearing on exam, BMI of 16.53 is severely underweight. He has lost 20-25 lbs in the past year. Pt reportedly takes a multivitamin and B12 supplementation as an outpatient, B12 level of 389. He denies hx of alcohol abuse, states that he has 1-2 glasses of whiskey per night. All other electrolytes wnl, total protein slightly decreased at 5.9, albumin 3.6, A1c 5.3%.    -Empiric folate supplementation  -Thiamine supplementation  -Daily multivitamin  -Cardiac diet  -PT/OT consulted, appreciate recs   -Recommending home w/ home health    Dispo: Likely d/c home tomorrow with home health + home  O2  -------------------------------------------------------  Laura Cr, MS4

## 2022-01-26 NOTE — DOCUMENTATION QUERY
[facility]                                                                       Query Response Note      PATIENT:               SALIMA WONG  ACCT #:                  [id number]  MRN:                     [id number]  :                      1940  ADMIT DATE:       2022 10:22 PM  DISCH DATE:          RESPONDING  PROVIDER #:        [id number]           QUERY TEXT:    Malnutrition and cachexia are documented in the MD PN, Severe malnutrition is documented in the RD consult. Can a clarification be made?    NOTE:  If an appropriate response is not listed below, please respond with a new note.    The patient's Clinical Indicators include:  22 RD consult: Malnutrition Risk: pt meets criteria for severe malnutrition in the context of chronic illness r/t decreased appetite most likely due to dx of CHF/COPD AEB report of 14-17% wt loss x 8 months and PO 75% of  normal x 8 months.   22 MD PN: Protein calorie malnutrition - BMI 14.05, -Patient with cachexia and temporal wasting on exam     Treatment: RD consult, Boost plus, Encourage intake >50%, Monitor weight  Risk Factors: Advanced age, HFrEF, COPD, Sepsis, LUIS FELIPE, Respiratory failure    Thank you,  Haylee Ahumada RN, BSN  Clinical   Connect via [employer]  .  Options provided:   -- Severe protein calorie malnutrition   -- Malnutrition, please specify mild, moderate and include clinical indicators   -- Cachexia only   -- Unable to determine      Query created by: Haylee Ahumada on 2022 9:04 AM    RESPONSE TEXT:    Severe protein calorie malnutrition          Electronically signed by:  FREDO LEVINE MD 2022 11:50 AM

## 2022-01-26 NOTE — DISCHARGE PLANNING
Anticipated Discharge Disposition: Home with HHC and likely DME-O2    Action: LSW met with pt at bedside to obtain HHC choice. LSW explained HHC and answered all questions. Pt stated he does not have time to wait around for people to come to his house multiple times/week. Pt may be agreeable to them coming once a week. Pt gave verbal consent to send referrals to 1) [facility] 2) [facility].    [facility] is Deangelo Hayden. LSW updated pt's face sheet. LSW sent Voalte to DO Connelly to request HHC orders and face to face. Choice form faxed to [facility].    Barriers to Discharge: HHC orders and face to face.    Plan: Care coordination will follow up with HHC referrals pending orders.

## 2022-01-26 NOTE — THERAPY
Physical Therapy   Initial Evaluation     Patient Name: Laron Doherty  Age:  81 y.o., Sex:  male  Medical Record #: [id number]  Today's Date: 1/26/2022     Precautions  Precautions: Fall Risk;Cardiac Precautions (See Comments)  Comments: EF 20%     Assessment  Patient is 81 y.o. male PMHx includes CAD, HTN, BPH, COPD, dyslipidemia, and tobacco abuse and admitted to [facility] with SOB. Patient was seen for PT evaluation presenting with generalized weakness, impaired balance, decreased activity tolerance, and poor safety awareness. Patient with labored breathing after 10' of ambulation and with poor FWW management. Patient with safety to concerns returning home and will benefit from HHPT. 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 If unable to attain resources, patient is not safe to return home.     Plan    Recommend Physical Therapy 3 times per week until therapy goals are met for the following treatments:  Gait Training, Stair Training and Therapeutic Activities    DC Equipment Recommendations: (P) None (owns FWW)  Discharge Recommendations: (P) Other - home health physical therapy services in addition to social service assist        Objective       01/26/22 1135   Prior Living Situation   Housing / Facility Mobile Home   Steps Into Home 3   Steps In Home 0   Bathroom Set up Bathtub / Shower Combination   Equipment Owned Front-Wheel Walker;Oxygen   Lives with - Patient's Self Care Capacity Alone and Able to Care For Self;Other (Comments)   Comments patient lives alone and is primary caregiver for ex-wife with hx of TBI   Prior Level of Functional Mobility   Bed Mobility Independent   Transfer Status Independent   Ambulation Independent   Distance Ambulation (Feet)   (community distances)   Assistive Devices Used None   Stairs Independent   Comments ambulates [id number] per day to get to bus stop     Gait Analysis   Gait Level Of Assist Contact Guard Assist   Assistive Device Front Wheel Walker   Distance (Feet) 20   # of Times Distance was Traveled 1   Deviation Decreased Base Of Support;Bradykinetic   # of Stairs Climbed 0   Weight Bearing Status no restrictions   Bed Mobility    Supine to Sit Supervised   Scooting Supervised   Functional Mobility   Sit to Stand Contact Guard Assist   Bed, Chair, Wheelchair Transfer Contact Guard Assist   Toilet Transfers Contact Guard Assist   Transfer Method Stand Step   Mobility w/ FWW   Comments intially retropulsive; poor FWW management    Short Term Goals    Short Term Goal # 1 in 6 visits pt to demo transfers mod-I with use of FWW in order to incresae fxnal IND   Short Term Goal # 2 in 6 visits pt to demo ambulation x50' with FWW in order to incresae fxnal IND   Short Term Goal # 3 in 6 visits pt to demo stair negotiation x10 with LRAD in order to increase fnxal IND   Anticipated Discharge Equipment and Recommendations   DC Equipment Recommendations None  (owns FWW)   Discharge Recommendations Other -

## 2022-01-26 NOTE — PROGRESS NOTES
Daily Progress Note:     Date of Service: 1/26/2022  Primary Team: [facility] IM Orange Team   Attending: Alberto Deluna M.D.   Senior Resident: Dr. Wang   Intern: Dr. Connelly  Contact:  [phone]    Chief Complaint:   Transfer from [facility] for [facility], presented there for shortness of breath and cough since December progressively getting worse    ID: Patient is an 81-year-old male with past medical history of dyslipidemia, hypertension, BPH, CKD stage 3a (BL Cr 1.3-1.9), tobacco use disorder presenting 1/24 after transfer from [facility] for left heart cath. Initially presented there for shortness of breath and wet cough that has been progressively worsening with onset few weeks prior. CXR 1/23: Bilateral lower lobe predominant pulmonary infiltrate. ECHO 1/23: LVEF 20%, global hypokinesis-most pronounced inferiorly; severe mitral zionbm-rhlhszntvq-mffxlyz to restricted movement of posterior leaflet.  Grade 2 diastolic dysfunction. US renal 1/23: Indeterminate a 2.5 cm calcified mass in the upper pole right kidney or adjacent adrenal gland.  UA 1/23 with many bacteria, 20-50 WBC, positive nitrites, negative leukocyte esterase-however patient without symptoms although he does have a history of self cathing 4 times daily. Cardiology consulted 1/24-hold off on [facility] given patient does not want invasive mgmt at this time. Now pending diuresis and medication optimization- likely discharge tomorrow.     Interval events:  -No acute events overnight  -Cardiology 1/26: will follow up with patient outpatient in clinic, medical mgmt at discharge- signed off     Subjective:   -Patient with improved cough/ SOB  -Has not had BM for few days    Consultants/Specialty:  Cardiology  PT/OT  Nutrition  SLP    Review of Systems:   Review of Systems   Constitutional: Negative for chills and fever.        On 5L NC this AM, unable to wean   HENT: Negative for sore throat.    Respiratory: Positive for cough (improved) and shortness of  breath (improved).    Cardiovascular: Negative for chest pain.   Gastrointestinal: Negative for abdominal pain, diarrhea, nausea and vomiting.   Genitourinary: Negative for dysuria and urgency.   Neurological: Negative for dizziness and headaches.   All other systems reviewed and are negative.    Objective Data:   Physical Exam:   Vitals:   Temp:  [36 °C (96.8 °F)-36.9 °C (98.5 °F)] 36.6 °C (97.8 °F)  Pulse:  [] 93  Resp:  [16-18] 16  BP: (103-121)/(58-74) 115/73  SpO2:  [91 %-96 %] 93 %     Physical Exam  Constitutional:       Appearance: He is ill-appearing. He is not toxic-appearing.      Comments: Patient with temporal wasting, appears cachectic. Seen on 10L oxymask this AM   HENT:      Head: Normocephalic and atraumatic.      Nose: Congestion present.      Mouth/Throat:      Pharynx: No oropharyngeal exudate or posterior oropharyngeal erythema.   Eyes:      General: No scleral icterus.     Extraocular Movements: Extraocular movements intact.      Pupils: Pupils are equal, round, and reactive to light.      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 Tachycardia present.      Heart sounds: No murmur (unable to appreciate severe MR) heard.  No friction rub. No gallop.       Comments: JVD visualized  Pulmonary:      Effort: No respiratory distress.      Breath sounds: No wheezing, rhonchi or rales.      Comments: Distant breath sounds, diffuse, b/l  Abdominal:      General: There is no distension.      Tenderness: There is no abdominal tenderness. There is no right CVA tenderness, left CVA tenderness, guarding or rebound.   Genitourinary:     Comments: Neg suprapubic tendernes  Musculoskeletal:      Right lower leg: No edema.      Left lower leg: No edema.      Comments: Patient requiring 2 person assist to stand   Scalene use with exertion, inc SOB but does not desat with movement   Skin:     Coloration: Skin is not jaundiced.       Findings: No erythema.   Neurological:      General: No focal deficit present.      Mental Status: He is alert and oriented to person, place, and time. Mental status is at baseline.   Psychiatric:         Mood and Affect: Mood normal.         Behavior: Behavior normal.       Labs:     Recent Labs     01/24/22 0218 01/25/22  0106 01/26/22  0255   WBC 8.1 13.4* 9.9   RBC 4.47* 4.10* 3.92*   HEMOGLOBIN 13.7* 12.6* 12.1*   HEMATOCRIT 41.1* 37.4* 35.7*   MCV 91.9 91.2 91.1   MCH 30.6 30.7 30.9   RDW 47.3 47.9 48.0   PLATELETCT 198 198 189   MPV 11.6 12.1 11.5   NEUTSPOLYS 92.40*  --  86.80*   LYMPHOCYTES 5.10*  --  3.50*   MONOCYTES 2.20  --  8.70   EOSINOPHILS 0.00  --  0.00   BASOPHILS 0.10  --  0.10     Recent Labs     01/24/22  1337 01/25/22  0106 01/26/22  0255   SODIUM 140 135 143   POTASSIUM 4.6 4.2 3.8   CHLORIDE 107 105 109   CO2 22 19* 20   GLUCOSE 123* 106* 105*   BUN 37* 43* 52*   CREATININE 1.77* 1.71* 1.68*     Recent Labs     01/24/22 0218 01/24/22 1337 01/26/22  0255   ASTSGOT 19 29 36   ALTSGPT 8 11 23   TBILIRUBIN 0.4 0.4 0.2   ALKPHOSPHAT 104* 91 72   GLOBULIN 2.5 2.3 2.1     Imaging:   No new imaging    Assessment and Plan    Patient is an 81-year-old male with past medical history of dyslipidemia, hypertension, BPH, CKD stage 3a (BL Cr 1.3-1.9), tobacco use disorder presenting 1/24 [facility]. Initially presented there for shortness of breath and wet cough that has been progressively worsening with onset few weeks prior. At the emergency department, vital signs with tachycardia in the 120s, tachypnea in the 20s. Patient required 15L oxymask  CBC with leukocytosis at 14.4, no anemia.  Chemistry with BUN 27 and creatinine 1.49. troponin 69 ->115 -> 204.  Lactic acid 2.4. CXR 1/23: Bilateral lower lobe predominant pulmonary infiltrate. ECHO 1/23: LVEF 20%, global hypokinesis-most pronounced inferiorly; severe mitral xjsjhg-mehhlnendh-mmozfea to restricted  movement of posterior leaflet.  Grade 2 diastolic dysfunction. US renal 1/23: Indeterminate a 2.5 cm calcified mass in the upper pole right kidney or adjacent adrenal gland. Further evaluation with CT with contrast is recommended. Enlarged prostate. UA 1/23 with many bacteria, 20-50 WBC, positive nitrites, negative leukocyte esterase-however patient without symptoms although he does have a history of self cathing 4 times daily. Cardiology consulted 1/24-hold off on LH given patient does not want invasive procedure. Now pending diuresis with 40 mg IV Lasix (to end tomorrow) and subsequent discharge with optimized medical management. Home O2 eval prior to discharge.    #Heart failure, reduced ejection fraction - systolic and diastolic dysfunction  #Hypertension   #Dyslipidemia  #Mitral regurgitation, severe    -BNP 66596 with trop 90 --> 136  -ECHO 1/23: LVEF 20% with global hypokinesis most pronounced inferiorly and severe MR (fntncl due to restricted mvmt) and grade II diastolic dysfunction [facility]: LVEF 35-40% with global hypokinesis, severe MR, severe TR and severe pulm HTN at that time as well.  -CXR 1/23: Bilateral lower lobe predominant pulmonary infiltrate   -TSH, A1C WNL   -Lipid panel: total chol 115, TG 78, HDL 49, LDL 50  -Per pt, BL weight 120lbs per patient - at BL on admission  **DDx: ischemic etiology vs non ischemic (HTN, tachy mediated with pt alc use; vs less likely infiltrative); possible SOB as anginal equivalent  PLAN  -Start spirinolactone 12.5mg PO qD  -Inc carvedilol to 6.25mg BID   -Lasix 40 mg IV BID, switch to 40mg PO qD tomorrow  -Atorvastatin 80 mg PO qD (increased home dose)   -Home aspirin 81mg PO qD  -Home lisinopril 20mg PO BID  -PRN hydralazine  -Strict I/Os  -Daily weights  -Cardiac diet with 1.5L fluid restriction, 2g Na restriction   -Cardiology consult: possible LHC tomorrow if patient able to lie flat without SOB     #Acute hypoxic respiratory  failure - improving  #Chronic obstructive pulmonary disease - in exacerbation ? No PFTS on file  #Pulmonary infiltrates  #Pulmonary hypertension, class combination of class II and III  -Shortness of breath and cough for few weeks  -RVSP 39 mmHg ECHO 1/23  -Requiring 15L NC after transfer from [facility], where he required 10L NC. Continues to be on 5L NC this AM. Has 2L O2 at home that he very rarely/ occasionally uses  -Passed bedside swallow  **pulm infiltrates likely fluid OL vs aspiration pneumonitis, unlikely bacterial PNA although concern for aspiration  PLAN   -Prednisone 40mg x 4 days (1/25-1/28)   -Azithro x 5 days (1/25-1/29)   -Scheduled benzonatate, Symbicort, Flonase, robitussin   -Scheduled q4hr albuterol nebs  -RT consult  -Will need face to face and order for home O2 eval/ home O2   -Outpatient PFTs    #NSTEMI, possibly type II although cannot r/o type I   -Patient without history of chest pain.  Does state he was told he had a heart attack a very long time ago.  -Troponin 69 ->115 -> 204  -EKG with ST depressions in anterior/inferior leads concerning for ischemia  -RADHA score 5, heart score 8  -Patient refused invasive procedures/ LHC   **trop likely elevated in setting of CKD to some degree as well  PLAN  -Aspirin 81mg PO qD   -Atorvastatin as above  -PRN nitroglycerin    #Anemia, normocytic   -Admission Hgb 13.7, MCV 91.9   -Iron panel and B12 WNL   -No hx of colonoscopy - [facility] although FOBT negative this admission   PLAN  -CTM    #Protein calorie malnutrition - BMI 14.05 - severe  -Patient with cachexia and temporal wasting on exam  PLAN  -Nutrition consult    #Tobacco use disorder  -1 PPD x60 years  PLAN  -Nicotine patch 14mg qD  -Counseled on risks of continued tobacco use     -- CHRONIC AND RESOLVED HOSPITAL PROBLEMS --     #Metabolic acidosis   RESOLVED  **likely due to CKD: kidney with reduced ability to excrete excess acid  PLAN  -CTM and consider starting sodium bicarb 650  mg PO BID and titrate to serum bicarb concentration 23-29 at discharge or as outpatient    #Leukocytosis  RESOLVED  -Admission WBC WNL, with inc s/p steroids as above      #Pyuria, asymptomatic  #Benign prostatic hyperplasia - home tamsulosin  -UA 1/23 with many bacteria, 20-50 WBC, positive nitrites, negative leukocyte esterase-however patient without symptoms although he does have a history of self cathing 4 times daily. Klebsiella oxytoca/Raoultella ornithinolytica growth     #Chronic kidney disease, stage 3a likely due to HTN   -Admission Cr 1.54, GFR 44. [facility], BL Cr 1.3-1.9  -A1C WNL   PLAN   -CTM  -Renally dose all medications  -Avoid nephrotoxic agents     #Renal mass   -Renal US 1/23 with indeterminate a 2.5 cm calcified mass in the upper pole right kidney or adjacent adrenal gland.  PLAN  -Further evaluation with CT with contrast as outpatient    #Impaired vision, left eye   #Brain mass, frontal lobe - nonoperable   -Patient only able to see shadows out of L eye.  B/l pupils responsive to light on exam   **mass possibly meningioma/ glioma   PLAN  -Monitor as outpatient     Fluids: none   Electrolytes: replete PRN   Nutrition: cardiac, with fluid and Na restriction - NPO midnight   Lines/ tubes/ drains: pIV   DVT ppx: heparin, hold at midnight   GI ppx: none   Code status: DNR/DNI   Dispo: medical mgmt, PT/OT eval, home O2 eval - discharge tomorrow likely     Ifeanyi Connelly, PGY-1  Internal Medicine

## 2022-01-26 NOTE — CARE PLAN
The patient is Watcher - Medium risk of patient condition declining or worsening    Shift Goals  Clinical Goals: titrate oxygen.   Patient Goals: improve SOB   Family Goals: n/a    Progress made toward(s) clinical / shift goals:    Problem: Knowledge Deficit - Standard  Goal: Patient and family/care givers will demonstrate understanding of plan of care, disease process/condition, diagnostic tests and medications  Outcome: Progressing  Note: White board updated with POC and care team information during bedside report.      Problem: Fall Risk  Goal: Patient will remain free from falls  Outcome: Progressing  Note: Fall precautions in place. Bed in lowest position. Non-skid socks in place. Personal possessions within reach. Mobility sign on door. Bed-alarm on. Call light within reach. Pt educated regarding fall prevention and states understanding.

## 2022-01-26 NOTE — RESPIRATORY CARE
"  COPD EDUCATION by COPD CLINICAL EDUCATOR  1/26/2022  at  10:06 AM by Jazlyn Levy, RRT     Patient interviewed by COPD education team.  Patient unable to participate in full program.  A short intervention has been conducted.  A comprehensive packet including information about COPD, types of treatments to manage their disease and safe home Oxygen usage was provided and reviewed with patient at the bedside. Patient states he has had SOB for some time now and is having difficulty coughing up phlegm. Discussed in detail with patient about lungs and COPD as it was the first time he's heard he has COPD. Spacer instruction provided and how to use flutter device recommended from his action plan. Inpatient pulmonary notified and discussed face to face about his case and plan for discharge.     Smoking Cessation Intervention and education completed, 5 minutes spent on smoking cessation education with patient.  Provided smoking cessation packet with \"Tips to Quit\" and brochure for \"Free Smoking Cessation Classes\".       COPD Screen  COPD Risk Screening  Do you have a history of COPD?: Yes  Do you have a Pulmonologist?: Yes    COPD Assessment  COPD Clinical Specialists ONLY  COPD Education Initiated: Yes--Short Intervention   DME Company: none  DME Equipment Type: none  [facility]  Pulmonologist Name: [facility]. patient seen by inpatient pulmonary.  Referrals Initiated: Yes  Pulmonary Rehab: Yes  Smoking Cessation: Yes  $ Smoking Cessation 3-10 Minutes: Symptomatic  Smoking Pack Years: >100 cigarettes  Palliative Care:  (not ordered and not seen on previous admissions)  Hospice: N/A  Home Health Care: Declined  [facility] Outreach: N/A  [facility]: N/A  [facility]: Declined  Private In-Home Care Agency: N/A  Is this a COPD exacerbation patient?: No (per Pulmonary)  $ Demo/Eval of SVN's, MDI's and Aerosols: Yes (patieng going home with rescue inhaler)  (OP) Pulmonary " "Function Testing:  (not ordered)    PFT Results    No results found for: PFT    Meds to Beds  Would the patient like to opt in for Bedside Medication Delivery at Discharge?: Yes, interested     MY COPD ACTION PLAN     It is recommended that patients and physicians /healthcare providers complete this action plan together. This plan should be discussed at each physician visit and updated as needed.    The green, yellow and red zones show groups of symptoms of COPD. This list of symptoms is not comprehensive, and you may experience other symptoms. In the \"Actions\" column, your healthcare provider has recommended actions for you to take based on your symptoms.    Patient Name: Laron Doherty   YOB: 1940   Last Updated on: 1/26/2022 10:05 AM   Green Zone:  I am doing well today Actions   •  Usual activitiy and exercise level •  Take daily medications   •  Usual amounts of cough and phlegm/mucus •  Use oxygen as prescribed   •  Sleep well at night •  Continue regular exercise/diet plan   •  Appetite is good •  At all times avoid cigarette smoke, inhaled irritants     Daily Medications (these medications are taken every day):   Olodaterol (Striverdi Respimat) 2 Puffs Once daily        Yellow Zone:  I am having a bad day or a COPD flare Actions   •  More breathless than usual •  Continue daily medications   •  I have less energy for my daily activities •  Use quick relief inhaler as ordered   •  Increased or thicker phlegm/mucus •  Use oxygen as prescribed   •  Using quick relief inhaler/nebulizer more often •  Get plenty of rest   •  Swelling of ankles more than usual •  Use pursed lip breathing   •  More coughing than usual •  At all times avoid cigarette smoke, inhaled irritants   •  I feel like I have a \"chest cold\"   •  Poor sleep and my symptoms woke me up   •  My appetite is not good   •  My medicine is not helping    •  Call provider immediately if symptoms don’t improve     Continue daily medications, " add rescue medications:   Albuterol 2 Puffs Every 6 hours PRN       Medications to be used during a flare up, (as Discussed with Provider):              Red Zone:  I need urgent medical care Actions   •  Severe shortness of breath even at rest •  Call 911 or seek medical care immediately   •  Not able to do any activity because of breathing    •  Fever or shaking chills    •  Feeling confused or very drowsy     •  Chest pains    •  Coughing up blood

## 2022-01-26 NOTE — THERAPY
"Occupational Therapy   Initial Evaluation     Patient Name: Laron Doherty  Age:  81 y.o., Sex:  male  Medical Record #: [id number]  Today's Date: 1/26/2022     Precautions: Fall Risk,Other ,Cardiac Precautions   Comments: EF 20% labile HR     Assessment  Patient is 81 y.o. male admitted for SOB. PMHx: CAD, HTN, BPH, COPD, dyslipidemia, and tobacco abuse. Pt is normally independent w/ADL's/IADL's and also provides assist to his ex-wife on a daily basis. At this time pt is dx w/sepsis, acute on chronic respiratory failure w/hypoxia, LUIS FELIPE, acute cystitis w/o hematuria, renal len, protein calorie malnutrition and severe mitral regurgitation. At this time pt is primarily limited by fatigue/poor endurance and limited activity tolerance. 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 May benefit for resources on RTC access as well as possible caregiver resources for his ex-wife. Pt also reports having issues w/the frequency for which he straight cath's because he is caring for his SO.     Plan  Recommend Occupational Therapy 2 times per week until therapy goals are met for the following treatments:  Adaptive Equipment, Self Care/Activities of Daily Living, Therapeutic Activities and Therapeutic Exercises. Energy Conservation strategies.     DC Equipment Recommendations: Unable to determine at this time  Discharge Recommendations: Recommend home health for continued occupational therapy services (needs social service assist )     Subjective  \"It feels good to get up and move\"      Objective     01/26/22 1131   Prior Living Situation   Prior Services Home-Independent   Housing / Facility Mobile Home   Steps Into Home 3   Steps In Home 0   Bathroom Set up Bathtub / Shower Combination   Equipment Owned Front-Wheel Walker;Oxygen   Lives with - Patient's Self Care Capacity Alone and Able to Care For Self;Other " (Comments)   Comments Pt is the primary care giver for his ex-wife who lives in a seperate residence    Prior Level of ADL Function   Self Feeding Independent   Grooming / Hygiene Independent   Bathing Independent   Dressing Independent   Toileting Independent   Prior Level of IADL Function   Medication Management Independent   Laundry Independent   Kitchen Mobility Independent   Finances Independent   Home Management Independent   Shopping Independent   Prior Level Of Mobility Independent Without Device in Community   Driving / Transportation Utilizes Public Transportation   Comments walks 2-4 miles/day to use public transit   History of Falls   History of Falls No   Precautions   Precautions Fall Risk;Other (See Comments);Cardiac Precautions (See Comments)   Comments EF 20% labile HR    Pain 0 - 10 Group   Therapist Pain Assessment Nurse Notified;0   Cognition    Cognition / Consciousness X   Level of Consciousness Alert   Comments very pleasant and cooperative, poor understanding of his overall health picture    Strength Upper Body   Upper Body Strength  WDL   Sensation Upper Body   Upper Extremity Sensation  Not Tested   Coordination Upper Body   Coordination WDL   Balance Assessment   Sitting Balance (Static) Fair +   Sitting Balance (Dynamic) Fair   Standing Balance (Static) Fair   Standing Balance (Dynamic) Fair   Weight Shift Sitting Fair   Weight Shift Standing Fair   Comments w/fww    Bed Mobility    Supine to Sit Supervised   ADL Assessment   Grooming Standby Assist;Standing   Lower Body Dressing Contact Guard Assist   Toileting Standby Assist   Comments close SBA-CGA d/t increased WOB and fatigue for safety; pt also normally straigh caths    How much help from another person does the patient currently need...   6 Clicks Daily Activity Score 21   Functional Mobility   Sit to Stand Standby Assist   Bed, Chair, Wheelchair Transfer Standby Assist   Toilet Transfers Standby Assist   Mobility walking in room  w/fww    Activity Tolerance   Comments increased WOB and SOB VSS    Patient / Family Goals   Patient / Family Goal #1 to get better    Short Term Goals   Short Term Goal # 1 pt will complete FB dressing w/spv and rest breaks as needed    Short Term Goal # 2 pt will verbalize and utilize 3 energy conservation strategies during ADL's w/o VC    Short Term Goal # 3 pt will complete grooming standing at sink for at least 5 min w/o fatigue    Education Group   Role of Occupational Therapist Patient Response Patient;Acceptance;Explanation   Problem List   Problem List Decreased Active Daily Living Skills;Decreased Functional Mobility;Decreased Activity Tolerance;Safety Awareness Deficits / Cognition   Anticipated Discharge Equipment and Recommendations   DC Equipment Recommendations Unable to determine at this time   Discharge Recommendations Recommend home health for continued occupational therapy services  (needs social service assist )   Interdisciplinary Plan of Care Collaboration   IDT Collaboration with  Nursing;Physical Therapist   Patient Position at End of Therapy Call Light within Reach;Tray Table within Reach;Seated;Chair Alarm On;Phone within Reach   Collaboration Comments RN aware of OT eval and pts efforts    Session Information   Date / Session Number  1/26 #1 (1/2, 2/1/22)   Priority 2

## 2022-01-26 NOTE — RESPIRATORY CARE
COPD EDUCATION by COPD CLINICAL EDUCATOR  1/26/2022 at 11:51 AM by Jazlyn Levy, RRT     Patient has bedside PFT ordered. Unable to perform at this time and notified the bedside RT/attending.

## 2022-01-26 NOTE — PROGRESS NOTES
Monitor Summary:    SR-ST SR-ST     Occasional PVC; Rare Couplets; Rare PAC    .16/.10/.33

## 2022-01-26 NOTE — FACE TO FACE
Face to Face Supporting Documentation - Home Health    The encounter with this patient was in whole or in part the primary reason for home health admission.    Date of encounter:   Patient:                    MRN:                       YOB: 2022  Laron Doherty  [id number]  1940     Home health to see patient for:  Medical social work consult    Skilled need for:  Exacerbation of Chronic Disease State heart failure    Skilled nursing interventions to include:  Line/Drain/Airway education and care    Homebound status evidenced by:  Need the aid of supportive devices such as crutches, canes, wheelchairs or walkers. Leaving home requires a considerable and taxing effort. There is a normal inability to leave the home.    Community Physician to provide follow up care: Deangelo Hayden M.D.     Optional Interventions? No      I certify the face to face encounter for this home health care referral meets the CMS requirements and the encounter/clinical assessment with the patient was, in whole, or in part, for the medical condition(s) listed above, which is the primary reason for home health care. Based on my clinical findings: the service(s) are medically necessary, support the need for home health care, and the homebound criteria are met.  I certify that this patient has had a face to face encounter by myself.  Ifeanyi Connelly D.O. - NPI: [id number]

## 2022-01-26 NOTE — THERAPY
"Speech Language Pathology   Clinical Swallow Evaluation     Patient Name: Laron Doherty  AGE:  81 y.o., SEX:  male  Medical Record #: [id number]  Today's Date: 1/26/2022     Precautions  Precautions: Fall Risk,Swallow Precautions ( See Comments),Cardiac Precautions (See Comments)  Comments: EF 20%     Assessment  Patient is 81 y.o. male admitted 1/24 from [facility] for sudden onset worsening dyspnea. Found to have COPD exacerbation, acute heart failure, and sepsis. PMHx of benign tumor of brain, tobacco use, DLD, CAD BPH, ARF. CXR 1/23 reports \"Bilateral lower lobe predominant pulmonary infiltrate.\" No previous SLP notes in EMR.     Patient seen this date for clinical swallow evaluation. Patient was initially strict NPO, but cardiology APN ordered cardiac diet, though patient hadn't received a tray yet. Patient found up in a chair, awake, alert, and pleasant. Patient appeared to be an accurate historian and reported no obvious s/sx of aspiration other than choking on a large pill w/ water recently. Patient's oral motor evaluation revealed no gross deficits with the exception of poor and missing dentition, though patient reports he can eat and chew without difficulty. Patient independently consumed PO trials of single ice chips, liquidized purees, soft solids, mixed consistencies, regular solids, and thins via cup sip and straw. Patient consumed all PO trials independently and with no overt s/sx of aspiration. Minimally prolonged mastication was noted on regular solids, but still appeared functional. Laryngeal elevation palpated as complete. Initiation of swallow trigger was timely. Vocal quality remained clear.     Recommend patient continue regular diet w/ thin liquids. Float large pills whole in puree please. Ok for small straw sips. SLP to follow for continued diet tolerance.     Plan  Recommend Speech Therapy 3 times per week until therapy goals are met for the following treatments:  Dysphagia Training and " Patient / Family / Caregiver Education.    Discharge Recommendations: Anticipate that the patient will have no further speech therapy needs after discharge from the hospital     Objective     01/26/22 1215   Precautions   Precautions Fall Risk;Swallow Precautions ( See Comments);Cardiac Precautions (See Comments)   Vitals   O2 (LPM) 5   O2 Delivery Device Silicone Nasal Cannula   Oral Motor Eval    Is Patient Able to Complete Oral Motor Eval Yes, Within Normal Limits   Laryngeal Function   Voice Quality Within Functional Limits   Volutional Cough Within Functional Limits   Excursion Upon Swallow Complete   Max Phonation Time (Seconds) 4   Oral Food Presentation   Ice Chips Within Functional Limits   Single Swallow Thin (0) Within Functional Limits   Serial Swallow Thin (0) Within Functional Limits   Liquidised (3) Within Functional Limits   Soft & Bite-Sized (6) - (Dysphagia III) Within Functional Limits   Regular (7) Within Functional Limits   Self Feeding Independent   Dysphagia Strategies / Recommendations   Strategies / Interventions Recommended (Yes / No) Yes   Compensatory Strategies Assistance Needed for Meal Tray Set-up;Head of Bed 90 Degrees During Eating / Drinking   Diet / Liquid Recommendation Regular (7);Thin (0)   Medication Administration  Whole with Liquid Wash;Float Whole with Puree  (Float large pills in puree )   Therapy Interventions Dysphagia Therapy By Speech Language Pathologist   Short Term Goals   Short Term Goal # 1 Patient will consume regular diet w/ thin liquids with no overt s/sx of aspiration.    Anticipated Discharge Needs   Discharge Recommendations Anticipate that the patient will have no further speech therapy needs after discharge from the hospital

## 2022-01-27 ENCOUNTER — APPOINTMENT (OUTPATIENT)
Dept: RADIOLOGY | Facility: MEDICAL CENTER | Age: 82
DRG: 280 | End: 2022-01-27
Attending: STUDENT IN AN ORGANIZED HEALTH CARE EDUCATION/TRAINING PROGRAM
Payer: COMMERCIAL

## 2022-01-27 LAB
ALBUMIN SERPL BCP-MCNC: 3.6 G/DL (ref 3.2–4.9)
ALBUMIN/GLOB SERPL: 1.8 G/DL
ALP SERPL-CCNC: 76 U/L (ref 30–99)
ALT SERPL-CCNC: 31 U/L (ref 2–50)
ANION GAP SERPL CALC-SCNC: 11 MMOL/L (ref 7–16)
AST SERPL-CCNC: 36 U/L (ref 12–45)
BILIRUB SERPL-MCNC: 0.4 MG/DL (ref 0.1–1.5)
BUN SERPL-MCNC: 59 MG/DL (ref 8–22)
CALCIUM SERPL-MCNC: 8.3 MG/DL (ref 8.5–10.5)
CHLORIDE SERPL-SCNC: 104 MMOL/L (ref 96–112)
CO2 SERPL-SCNC: 23 MMOL/L (ref 20–33)
CREAT SERPL-MCNC: 1.73 MG/DL (ref 0.5–1.4)
GLOBULIN SER CALC-MCNC: 2 G/DL (ref 1.9–3.5)
GLUCOSE SERPL-MCNC: 97 MG/DL (ref 65–99)
POTASSIUM SERPL-SCNC: 4.1 MMOL/L (ref 3.6–5.5)
PROT SERPL-MCNC: 5.6 G/DL (ref 6–8.2)
PSA SERPL-MCNC: 15.4 NG/ML (ref 0–4)
SODIUM SERPL-SCNC: 138 MMOL/L (ref 135–145)

## 2022-01-27 PROCEDURE — 700102 HCHG RX REV CODE 250 W/ 637 OVERRIDE(OP): Performed by: NURSE PRACTITIONER

## 2022-01-27 PROCEDURE — A9270 NON-COVERED ITEM OR SERVICE: HCPCS | Performed by: STUDENT IN AN ORGANIZED HEALTH CARE EDUCATION/TRAINING PROGRAM

## 2022-01-27 PROCEDURE — 700102 HCHG RX REV CODE 250 W/ 637 OVERRIDE(OP): Performed by: STUDENT IN AN ORGANIZED HEALTH CARE EDUCATION/TRAINING PROGRAM

## 2022-01-27 PROCEDURE — 770001 HCHG ROOM/CARE - MED/SURG/GYN PRIV*

## 2022-01-27 PROCEDURE — 36415 COLL VENOUS BLD VENIPUNCTURE: CPT

## 2022-01-27 PROCEDURE — 700101 HCHG RX REV CODE 250: Performed by: INTERNAL MEDICINE

## 2022-01-27 PROCEDURE — 84153 ASSAY OF PSA TOTAL: CPT

## 2022-01-27 PROCEDURE — 94640 AIRWAY INHALATION TREATMENT: CPT

## 2022-01-27 PROCEDURE — 700111 HCHG RX REV CODE 636 W/ 250 OVERRIDE (IP): Performed by: STUDENT IN AN ORGANIZED HEALTH CARE EDUCATION/TRAINING PROGRAM

## 2022-01-27 PROCEDURE — 700102 HCHG RX REV CODE 250 W/ 637 OVERRIDE(OP): Performed by: INTERNAL MEDICINE

## 2022-01-27 PROCEDURE — 80053 COMPREHEN METABOLIC PANEL: CPT

## 2022-01-27 PROCEDURE — 94669 MECHANICAL CHEST WALL OSCILL: CPT

## 2022-01-27 PROCEDURE — A9270 NON-COVERED ITEM OR SERVICE: HCPCS | Performed by: INTERNAL MEDICINE

## 2022-01-27 PROCEDURE — 99233 SBSQ HOSP IP/OBS HIGH 50: CPT | Mod: GC | Performed by: INTERNAL MEDICINE

## 2022-01-27 PROCEDURE — 84425 ASSAY OF VITAMIN B-1: CPT

## 2022-01-27 PROCEDURE — 71046 X-RAY EXAM CHEST 2 VIEWS: CPT

## 2022-01-27 PROCEDURE — A9270 NON-COVERED ITEM OR SERVICE: HCPCS | Performed by: NURSE PRACTITIONER

## 2022-01-27 RX ORDER — SPIRONOLACTONE 25 MG/1
25 TABLET ORAL
Status: DISCONTINUED | OUTPATIENT
Start: 2022-01-28 | End: 2022-01-27

## 2022-01-27 RX ORDER — DOXYCYCLINE 100 MG/1
100 TABLET ORAL EVERY 12 HOURS
Status: DISCONTINUED | OUTPATIENT
Start: 2022-01-27 | End: 2022-01-27

## 2022-01-27 RX ORDER — DOXYCYCLINE 100 MG/1
100 TABLET ORAL EVERY 12 HOURS
Status: DISCONTINUED | OUTPATIENT
Start: 2022-01-27 | End: 2022-01-28 | Stop reason: HOSPADM

## 2022-01-27 RX ORDER — FUROSEMIDE 10 MG/ML
40 INJECTION INTRAMUSCULAR; INTRAVENOUS
Status: DISCONTINUED | OUTPATIENT
Start: 2022-01-27 | End: 2022-01-28

## 2022-01-27 RX ORDER — SPIRONOLACTONE 25 MG/1
12.5 TABLET ORAL
Status: DISCONTINUED | OUTPATIENT
Start: 2022-01-28 | End: 2022-01-28 | Stop reason: HOSPADM

## 2022-01-27 RX ADMIN — BENZONATATE 100 MG: 100 CAPSULE ORAL at 05:20

## 2022-01-27 RX ADMIN — BENZONATATE 100 MG: 100 CAPSULE ORAL at 17:26

## 2022-01-27 RX ADMIN — FUROSEMIDE 40 MG: 10 INJECTION, SOLUTION INTRAMUSCULAR; INTRAVENOUS at 09:03

## 2022-01-27 RX ADMIN — PREDNISONE 40 MG: 20 TABLET ORAL at 09:02

## 2022-01-27 RX ADMIN — CARVEDILOL 6.25 MG: 6.25 TABLET, FILM COATED ORAL at 09:03

## 2022-01-27 RX ADMIN — BENZONATATE 100 MG: 100 CAPSULE ORAL at 12:04

## 2022-01-27 RX ADMIN — FUROSEMIDE 40 MG: 40 TABLET ORAL at 05:21

## 2022-01-27 RX ADMIN — NICOTINE 14 MG: 14 PATCH TRANSDERMAL at 05:19

## 2022-01-27 RX ADMIN — CARVEDILOL 6.25 MG: 6.25 TABLET, FILM COATED ORAL at 17:26

## 2022-01-27 RX ADMIN — IPRATROPIUM BROMIDE AND ALBUTEROL SULFATE 3 ML: .5; 2.5 SOLUTION RESPIRATORY (INHALATION) at 07:49

## 2022-01-27 RX ADMIN — DOXYCYCLINE 100 MG: 100 TABLET ORAL at 09:02

## 2022-01-27 RX ADMIN — TAMSULOSIN HYDROCHLORIDE 0.4 MG: 0.4 CAPSULE ORAL at 17:27

## 2022-01-27 RX ADMIN — HEPARIN SODIUM 5000 UNITS: 5000 INJECTION, SOLUTION INTRAVENOUS; SUBCUTANEOUS at 21:51

## 2022-01-27 RX ADMIN — BUDESONIDE AND FORMOTEROL FUMARATE DIHYDRATE 2 PUFF: 160; 4.5 AEROSOL RESPIRATORY (INHALATION) at 07:49

## 2022-01-27 RX ADMIN — SPIRONOLACTONE 12.5 MG: 25 TABLET ORAL at 05:21

## 2022-01-27 RX ADMIN — HEPARIN SODIUM 5000 UNITS: 5000 INJECTION, SOLUTION INTRAVENOUS; SUBCUTANEOUS at 15:16

## 2022-01-27 RX ADMIN — THERA TABS 1 TABLET: TAB at 09:02

## 2022-01-27 RX ADMIN — Medication 100 MG: at 09:02

## 2022-01-27 RX ADMIN — HEPARIN SODIUM 5000 UNITS: 5000 INJECTION, SOLUTION INTRAVENOUS; SUBCUTANEOUS at 05:20

## 2022-01-27 RX ADMIN — LISINOPRIL 20 MG: 20 TABLET ORAL at 17:27

## 2022-01-27 RX ADMIN — LISINOPRIL 20 MG: 20 TABLET ORAL at 05:21

## 2022-01-27 RX ADMIN — FOLIC ACID 1 MG: 1 TABLET ORAL at 05:21

## 2022-01-27 RX ADMIN — ATORVASTATIN CALCIUM 80 MG: 80 TABLET, FILM COATED ORAL at 05:21

## 2022-01-27 RX ADMIN — DOXYCYCLINE 100 MG: 100 TABLET ORAL at 17:26

## 2022-01-27 RX ADMIN — ASPIRIN 81 MG: 81 TABLET, COATED ORAL at 05:21

## 2022-01-27 RX ADMIN — FLUTICASONE PROPIONATE 100 MCG: 50 SPRAY, METERED NASAL at 05:26

## 2022-01-27 ASSESSMENT — ENCOUNTER SYMPTOMS
SORE THROAT: 0
DIZZINESS: 0
HEADACHES: 0
CHILLS: 0
FEVER: 0
VOMITING: 0
COUGH: 0
NAUSEA: 0
SHORTNESS OF BREATH: 0
DIARRHEA: 0
ABDOMINAL PAIN: 0

## 2022-01-27 ASSESSMENT — PAIN DESCRIPTION - PAIN TYPE
TYPE: ACUTE PAIN
TYPE: ACUTE PAIN

## 2022-01-27 ASSESSMENT — FIBROSIS 4 INDEX: FIB4 SCORE: 2.77

## 2022-01-27 NOTE — DISCHARGE PLANNING
Received Choice form at 1/26 @1500  Agency/Facility Name: [facility]  Referral sent per Choice form @ 8157

## 2022-01-27 NOTE — DISCHARGE PLANNING
Received Choice form at 1030  Agency[facility]Facility Name: [facility]  Referral sent per Choice form @ 1230     1442-  Agency/Facility Name: [facility]  Spoke To: Hair  Outcome: Referral is still under review, Hair to contact [facility] with updated referral status.     1446-  Agency/Facility Name: [facility]  Spoke To: Hair   Outcome: Pt is accepted to [facility]  agency services.     1505-  Agency/Facility Name: [facility]  Outcome: [facility] faxed [facility] for review.

## 2022-01-27 NOTE — PROGRESS NOTES
Monitor Summary  Rhythm: SR, ST  Rate:   Ectopy: R PAC, O PVC, R coup, trigem,   .17 / .10 / .38

## 2022-01-27 NOTE — CARE PLAN
The patient is Stable - Low risk of patient condition declining or worsening    Shift Goals  Clinical Goals: Straigh cath as needed, rest.  Patient Goals: improve SOB   Family Goals: n/a    Progress made toward(s) clinical / shift goals:  Pt straight cathed during shift when needed, pt rested throughout shift.     Patient is not progressing towards the following goals:N/A    Problem: Knowledge Deficit - Standard  Goal: Patient and family/care givers will demonstrate understanding of plan of care, disease process/condition, diagnostic tests and medications  1/26/2022 2340 by Catarina Craig R.N.  Outcome: Progressing    Problem: Fall Risk  Goal: Patient will remain free from falls  1/26/2022 2340 by Catarina Craig R.N.  Outcome: Progressing  Note: Patient is at high risk for falling. Patient educated on use of call light and encouraged to call before getting up. Patient verbalized understanding. All fall precautions in place.     Problem: Skin Integrity  Goal: Skin integrity is maintained or improved  1/26/2022 2340 by Catarina Craig R.N.  Outcome: Progressing  Note: Pt encouraged to turn and reposition every two hours. Incontinence care provided and barrier paste applied as needed.

## 2022-01-27 NOTE — CARE PLAN
Problem: Bronchoconstriction  Goal: Improve in air movement and diminished wheezing  Description: Target End Date:  2 to 3 days    1.  Implement inhaled treatments  2.  Evaluate and manage medication effects  Outcome: Met

## 2022-01-27 NOTE — FACE TO FACE
"Face to Face Note  -  Durable Medical Equipment    Ifeanyi Connelly D.O. - NPI: [id number]  I certify that this patient is under my care and that they had a durable medical equipment(DME)face to face encounter by myself that meets the physician DME face-to-face encounter requirements with this patient on:    Date of encounter:   Patient:                    MRN:                       YOB: 2022  Laron Doherty  [id number]  1940     The encounter with the patient was in whole, or in part, for the following medical condition, which is the primary reason for durable medical equipment:  CHF    I certify that, based on my findings, the following durable medical equipment is medically necessary:  Oxygen.    HOME O2 Saturation Measurements:(Values must be present for Home Oxygen orders)  Room air sat at rest: 93  Room air sat with amb: 84  With liters of O2: 4, O2 sat at rest with O2: 95  With Liters of O2: 4, O2 sat with amb with O2 : 88  Is the patient mobile?: Yes    My Clinical findings support the need for the above equipment due to:  Hypoxia    Supporting Symptoms: The patient requires supplemental oxygen, as the following interventions have been tried with limited or no improvement: \"Bronchodilators and/or steroid inhalers    If patient feels more short of breath, they can go up to 6 liters per minute and contact healthcare provider.    Ifeanyi Connelly, PGY-1  Internal Medicine  "

## 2022-01-27 NOTE — CONSULTS
Daily Progress Note:     Date of Service: 1/26/2022  Attending: DEDE Sears   Resident: Dr. España        Subjective:  Chas is a 81 year old male presenting 1/24 after transfer from [facility] for Left heart cath.   Pt has a hx of COPD,  no PFT's on file, pt denies use of long acting inhalers. Longterm 60 pack year Tobacco use,. Newly diagnosed HFrEF 20% (1/23) w/ grade II diastolic dysfunction. [facility] to be considered outpatient with correction of azotemia and anemia.   Pt reports progressively worsening dyspnea, orthopnea over the last 2 weeks along with dry cough. Pt states he has been having to sleep on a recliner for the last week.         Review of Systems:   Review of Systems   Constitutional: Negative for chills and fever.   Respiratory: Positive for cough (Dry) and shortness of breath. Negative for sputum production.    Cardiovascular: Positive for orthopnea. Negative for chest pain, palpitations and leg swelling.   Gastrointestinal: Negative.    Neurological: Negative.        Objective Data:   Physical Exam:   Vitals:   Temp:  [36.1 °C (97 °F)-36.9 °C (98.5 °F)] 36.2 °C (97.2 °F)  Pulse:  [] 94  Resp:  [16-18] 18  BP: (110-127)/(62-82) 127/82  SpO2:  [91 %-96 %] 91 %     Physical Exam  Constitutional:       General: He is not in acute distress.  HENT:      Mouth/Throat:      Mouth: Mucous membranes are dry.   Cardiovascular:      Rate and Rhythm: Normal rate and regular rhythm.      Pulses: Normal pulses.      Heart sounds: No murmur heard.      Pulmonary:      Effort: Pulmonary effort is normal. No respiratory distress.      Breath sounds: Wheezing and rales present.   Abdominal:      General: Abdomen is flat.   Musculoskeletal:         General: No swelling.      Right lower leg: No edema.      Left lower leg: No edema.   Neurological:      General: No focal deficit present.      Mental Status: He is alert and oriented to person, place, and time.           Labs:   Lab Results   Component Value  Date/Time    SODIUM 143 01/26/2022 02:55 AM    POTASSIUM 3.8 01/26/2022 02:55 AM    CHLORIDE 109 01/26/2022 02:55 AM    CO2 20 01/26/2022 02:55 AM    GLUCOSE 105 (H) 01/26/2022 02:55 AM    BUN 52 (H) 01/26/2022 02:55 AM    CREATININE 1.68 (H) 01/26/2022 02:55 AM    CREATININE 1.2 03/28/2009 03:00 PM      Lab Results   Component Value Date/Time    WBC 9.9 01/26/2022 02:55 AM    RBC 3.92 (L) 01/26/2022 02:55 AM    HEMOGLOBIN 12.1 (L) 01/26/2022 02:55 AM    HEMATOCRIT 35.7 (L) 01/26/2022 02:55 AM    MCV 91.1 01/26/2022 02:55 AM    MCH 30.9 01/26/2022 02:55 AM    MCHC 33.9 01/26/2022 02:55 AM    MPV 11.5 01/26/2022 02:55 AM    NEUTSPOLYS 86.80 (H) 01/26/2022 02:55 AM    LYMPHOCYTES 3.50 (L) 01/26/2022 02:55 AM    MONOCYTES 8.70 01/26/2022 02:55 AM    EOSINOPHILS 0.00 01/26/2022 02:55 AM    BASOPHILS 0.10 01/26/2022 02:55 AM        Imaging:   NM-LUNG PERFUSION IMAGING    (Results Pending)       Problem Representation:     No new Assessment & Plan notes have been filed under this hospital service since the last note was generated.  Service: Pulmonary    #AHRF   #HFrEF 20%  #COPD  2/2 to heart failure pt orthopneic w/ crackles on exam, Cont to follow Cardio recommendations.   mild wheezing on exam. CXR shows Hyperinflated lungs, w/ concerns of possible underlying Atypical pneumonia    - Cont duonebs q6hr, outpatient spiriva and albuterol   - 5 day course Azithromycin   - F/U outpatient, will need PFT's

## 2022-01-27 NOTE — DISCHARGE PLANNING
Anticipated Discharge Disposition: home with HHC and home oxygen set up    Action:   · Completed chart review  · Provided education regarding home oxygen providers. Pt states that he has a concentrator at home that is maintained by [facility]. Obtained choice for [facility] and faxed to [facility].     Barriers to Discharge:   · Medical clearance   · Home health care acceptance    Plan:   · Continue to collaborate with the pt, pt's family, and health care team to provide social and discharge support as needed.

## 2022-01-27 NOTE — PROGRESS NOTES
Assumed care of patient at bedside report from DAY RN. Updated on POC. Patient currently A & O x 4; on 5 L O2 silicone nasal cannula; up with one assist; without complaints of acute pain. Call light within reach. Whiteboard updated. Fall precautions in place. Bed locked and in lowest position. All questions answered. No other needs indicated at this time.

## 2022-01-27 NOTE — PROGRESS NOTES
Report received from MER Mendes. Assumed care of patient. Updated patient on plan of care. Fall precautions in place, call light within reach.

## 2022-01-27 NOTE — DISCHARGE PLANNING
Received Choice form at 1500  Agency[facility]Facility Name: [facility]  Referral not sent as HH order has not been completed, please complete and upload HH order for referral to be processed.

## 2022-01-27 NOTE — PROGRESS NOTES
Daily Progress Note:     Date of Service: 1/27/2022  Primary Team: [employer] IM Orange Team   Attending: Alberto Deluna M.D.   Senior Resident: Dr. Wang   Intern: Dr. Connelly  Contact:  [phone]    Chief Complaint:   Transfer from [facility] for [facility], presented there for shortness of breath and cough since December progressively getting worse    ID: Patient is an 81-year-old male with past medical history of dyslipidemia, hypertension, BPH, CKD stage 3a (BL Cr 1.3-1.9), tobacco use disorder presenting 1/24 after transfer from [facility] for left heart cath. Initially presented there for shortness of breath and wet cough that has been progressively worsening with onset few weeks prior. CXR 1/23: Bilateral lower lobe predominant pulmonary infiltrate. ECHO 1/23: LVEF 20%, global hypokinesis-most pronounced inferiorly; severe mitral odonhl-assqqkayxn-gmjaktt to restricted movement of posterior leaflet.  Grade 2 diastolic dysfunction. US renal 1/23: Indeterminate a 2.5 cm calcified mass in the upper pole right kidney or adjacent adrenal gland.  UA 1/23 with many bacteria, 20-50 WBC, positive nitrites, negative leukocyte esterase-however patient without symptoms although he does have a history of self cathing 4 times daily. Cardiology consulted 1/24-hold off on [facility] given patient does not want invasive mgmt at this time. Now pending diuresis and medication optimization- likely discharge tomorrow.      Interval events:  -No acute events overnight  -Cardiology 1/26: will follow up with patient outpatient in clinic, medical mgmt at discharge- signed off   -Seen without NC- SpO2 82, required 2L NC at rest to maintain adequate SpO2      Subjective:   -Patient feels much improved, willing to stay another night for continued diuresis  -Feels NC is uncomfortable    Consultants/Specialty:  Cardiology  PT/OT  Nutrition  SLP    Review of Systems:   Review of Systems   Constitutional: Negative for chills and fever.   HENT:  Negative for sore throat.    Respiratory: Negative for cough (improved) and shortness of breath (improved).    Cardiovascular: Negative for chest pain.   Gastrointestinal: Negative for abdominal pain, diarrhea, nausea and vomiting.   Genitourinary: Negative for dysuria and urgency.   Neurological: Negative for dizziness and headaches.   All other systems reviewed and are negative.    Objective Data:   Physical Exam:   Vitals:   Temp:  [36.1 °C (97 °F)-36.9 °C (98.5 °F)] 36.7 °C (98 °F)  Pulse:  [] 61  Resp:  [18] 18  BP: (113-129)/(62-88) 113/62  SpO2:  [91 %-96 %] 91 %     Physical Exam  Constitutional:       Appearance: He is not toxic-appearing.      Comments: Patient with temporal wasting, appears cachectic.   Seen on RA this AM, requiring 2L NC   HENT:      Head: Normocephalic and atraumatic.      Nose: Congestion present.      Mouth/Throat:      Pharynx: No oropharyngeal exudate or posterior oropharyngeal erythema.   Eyes:      General: No scleral icterus.     Extraocular Movements: Extraocular movements intact.      Pupils: Pupils are equal, round, and reactive to light.      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      Heart sounds: No murmur (unable to appreciate severe MR) heard.  No friction rub. No gallop.    Pulmonary:      Effort: No respiratory distress.      Breath sounds: No wheezing, rhonchi or rales.      Comments: Distant breath sounds, diffuse, b/l  Abdominal:      General: There is no distension.      Tenderness: There is no abdominal tenderness. There is no right CVA tenderness, left CVA tenderness, guarding or rebound.   Genitourinary:     Comments: Neg suprapubic tendernes  Musculoskeletal:      Right lower leg: No edema.      Left lower leg: No edema.      Comments: Patient requiring 2 person assist to stand   Scalene use with exertion, inc SOB but does not desat with movement   Skin:      Coloration: Skin is not jaundiced.      Findings: No erythema.   Neurological:      General: No focal deficit present.      Mental Status: He is alert and oriented to person, place, and time. Mental status is at baseline.   Psychiatric:         Mood and Affect: Mood normal.         Behavior: Behavior normal.       Labs:     Recent Labs     01/25/22  0106 01/26/22  0255 01/27/22  0159   SODIUM 135 143 138   POTASSIUM 4.2 3.8 4.1   CHLORIDE 105 109 104   CO2 19* 20 23   GLUCOSE 106* 105* 97   BUN 43* 52* 59*   CREATININE 1.71* 1.68* 1.73*     Recent Labs     01/26/22  0255 01/27/22  0159   ASTSGOT 36 36   ALTSGPT 23 31   TBILIRUBIN 0.2 0.4   ALKPHOSPHAT 72 76   GLOBULIN 2.1 2.0     Assessment and Plan    Patient is an 81-year-old male with past medical history of dyslipidemia, hypertension, BPH, CKD stage 3a (BL Cr 1.3-1.9), tobacco use disorder presenting 1/24 after transfer from [facility] for left heart cath. Initially presented there for shortness of breath and wet cough that has been progressively worsening with onset few weeks prior. At the emergency department, vital signs with tachycardia in the 120s, tachypnea in the 20s. Patient required 15L oxymask  CBC with leukocytosis at 14.4, no anemia.  Chemistry with BUN 27 and creatinine 1.49. troponin 69 ->115 -> 204.  Lactic acid 2.4. CXR 1/23: Bilateral lower lobe predominant pulmonary infiltrate. ECHO 1/23: LVEF 20%, global hypokinesis-most pronounced inferiorly; severe mitral bbmnmo-sdqjevvrzu-peoqflm to restricted movement of posterior leaflet.  Grade 2 diastolic dysfunction. US renal 1/23: Indeterminate a 2.5 cm calcified mass in the upper pole right kidney or adjacent adrenal gland. Further evaluation with CT with contrast is recommended. Enlarged prostate. UA 1/23 with many bacteria, 20-50 WBC, positive nitrites, negative leukocyte esterase-however patient without symptoms although he does have a history of self cathing 4 times daily. Cardiology consulted  1/24-hold off on [facility] given patient does not want invasive procedure. Now pending diuresis with 40 mg IV Lasix (to end tomorrow) and subsequent discharge with optimized medical management.    #Heart failure, reduced ejection fraction - systolic and diastolic dysfunction  #Hypertension   #Dyslipidemia  #Mitral regurgitation, severe    -BNP 48835 with trop 90 --> 136  -ECHO 1/23: LVEF 20% with global hypokinesis most pronounced inferiorly and severe MR (fntncl due to restricted mvmt) and grade II diastolic dysfunction [facility]: LVEF 35-40% with global hypokinesis, severe MR, severe TR and severe pulm HTN at that time as well.  -CXR 1/23: Bilateral lower lobe predominant pulmonary infiltrate   -TSH, A1C WNL   -Lipid panel: total chol 115, TG 78, HDL 49, LDL 50  -Per pt, BL weight 120lbs per patient - at BL on admission  **DDx: ischemic etiology vs non ischemic (HTN, tachy mediated with pt alc use; vs less likely infiltrative); possible SOB as anginal equivalent  PLAN  -Spirinolactone 12.5mg PO qD - unable to do full 25mg dosing due to renal function  -Carvedilol to 6.25mg BID   -Lasix 40 mg IV BID, switch to 20mg PO qD prior to discharge  -Atorvastatin 80 mg PO qD (increased home dose)   -Home aspirin 81mg PO qD  -Home lisinopril 20mg PO BID  -PRN hydralazine  -Strict I/Os  -Daily weights  -Cardiac diet with 1.5L fluid restriction, 2g Na restriction   -Cardiology consult: will follow with patient as outpatient    #Acute hypoxic respiratory failure - improving  #Chronic obstructive pulmonary disease - in exacerbation ? No PFTS on file  #Pulmonary infiltrates  #Pulmonary hypertension, class combination of class II and III  -Shortness of breath and cough for few weeks  -RVSP 39 mmHg ECHO 1/23  -Requiring 15L NC after transfer from [facility], where he required 10L NC. Continues to be on 5L NC this AM. Has 2L O2 at home that he very rarely/ occasionally uses  -Passed bedside  swallow  -Home O2 eval: resting RA, active 4L  **pulm infiltrates likely fluid OL vs aspiration pneumonitis, unlikely bacterial PNA although concern for aspiration  PLAN   -Prednisone 40mg x 4 days (1/25-1/28) (received solumedrol in ED)    -Switch from Azithro to doxycycline x 5 days (1/25-1/29)   -Scheduled benzonatate, Symbicort, Flonase, robitussin   -Scheduled q4hr albuterol nebs  -RT consult  -Outpatient PFTs    #NSTEMI, possibly type II although cannot r/o type I   -Patient without history of chest pain.  Does state he was told he had a heart attack a very long time ago.  -Troponin 69 ->115 -> 204  -EKG with ST depressions in anterior/inferior leads concerning for ischemia  -RADHA score 5, heart score 8  -Patient refused invasive procedures/ LHC   **trop likely elevated in setting of CKD to some degree as well  PLAN  -Aspirin 81mg PO qD   -Atorvastatin as above  -PRN nitroglycerin    #Anemia, normocytic   -Admission Hgb 13.7, MCV 91.9   -Iron panel and B12 WNL   -No hx of colonoscopy - check [facility] although FOBT negative this admission   PLAN  -CTM    #Protein calorie malnutrition - BMI 14.05 - severe  -Patient with cachexia and temporal wasting on exam  PLAN  -Nutrition consult    #Tobacco use disorder  -1 PPD x60 years  PLAN  -Nicotine patch 14mg qD  -Counseled on risks of continued tobacco use     -- CHRONIC AND RESOLVED HOSPITAL PROBLEMS --     #Metabolic acidosis   RESOLVED  **likely due to CKD: kidney with reduced ability to excrete excess acid  PLAN  -CTM and consider starting sodium bicarb 650 mg PO BID and titrate to serum bicarb concentration 23-29 at discharge or as outpatient    #Leukocytosis  RESOLVED  -Admission WBC WNL, with inc s/p steroids as above      #Pyuria, asymptomatic  #Benign prostatic hyperplasia - home tamsulosin  -UA 1/23 with many bacteria, 20-50 WBC, positive nitrites, negative leukocyte esterase-however patient without symptoms although he does have a history of self cathing  4 times daily. Klebsiella oxytoca/Raoultella ornithinolytica growth     #Chronic kidney disease, stage 3a likely due to HTN   -Admission Cr 1.54, GFR 44. Per [facility] records, BL Cr 1.3-1.9  -A1C WNL   PLAN   -CTM  -Renally dose all medications  -Avoid nephrotoxic agents     #Renal mass   -Renal US 1/23 with indeterminate a 2.5 cm calcified mass in the upper pole right kidney or adjacent adrenal gland.  PLAN  -Further evaluation with CT with contrast as outpatient    #Impaired vision, left eye   #Brain mass, frontal lobe - nonoperable   -Patient only able to see shadows out of L eye.  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

## 2022-01-28 ENCOUNTER — PHARMACY VISIT (OUTPATIENT)
Dept: PHARMACY | Facility: MEDICAL CENTER | Age: 82
End: 2022-01-28
Payer: COMMERCIAL

## 2022-01-28 VITALS
SYSTOLIC BLOOD PRESSURE: 118 MMHG | BODY MASS INDEX: 15.8 KG/M2 | HEIGHT: 72 IN | OXYGEN SATURATION: 92 % | RESPIRATION RATE: 16 BRPM | TEMPERATURE: 97.4 F | WEIGHT: 116.62 LBS | DIASTOLIC BLOOD PRESSURE: 79 MMHG | HEART RATE: 62 BPM

## 2022-01-28 LAB
ANION GAP SERPL CALC-SCNC: 13 MMOL/L (ref 7–16)
BACTERIA BLD CULT: NORMAL
BUN SERPL-MCNC: 63 MG/DL (ref 8–22)
CALCIUM SERPL-MCNC: 8.7 MG/DL (ref 8.5–10.5)
CHLORIDE SERPL-SCNC: 103 MMOL/L (ref 96–112)
CO2 SERPL-SCNC: 24 MMOL/L (ref 20–33)
CREAT SERPL-MCNC: 1.78 MG/DL (ref 0.5–1.4)
GLUCOSE SERPL-MCNC: 95 MG/DL (ref 65–99)
MAGNESIUM SERPL-MCNC: 2.5 MG/DL (ref 1.5–2.5)
PHOSPHATE SERPL-MCNC: 4.6 MG/DL (ref 2.5–4.5)
POTASSIUM SERPL-SCNC: 4.8 MMOL/L (ref 3.6–5.5)
SIGNIFICANT IND 70042: NORMAL
SITE SITE: NORMAL
SODIUM SERPL-SCNC: 140 MMOL/L (ref 135–145)
SOURCE SOURCE: NORMAL

## 2022-01-28 PROCEDURE — 90732 PPSV23 VACC 2 YRS+ SUBQ/IM: CPT | Performed by: INTERNAL MEDICINE

## 2022-01-28 PROCEDURE — 700102 HCHG RX REV CODE 250 W/ 637 OVERRIDE(OP)

## 2022-01-28 PROCEDURE — 700102 HCHG RX REV CODE 250 W/ 637 OVERRIDE(OP): Performed by: STUDENT IN AN ORGANIZED HEALTH CARE EDUCATION/TRAINING PROGRAM

## 2022-01-28 PROCEDURE — 90471 IMMUNIZATION ADMIN: CPT

## 2022-01-28 PROCEDURE — A9270 NON-COVERED ITEM OR SERVICE: HCPCS | Performed by: STUDENT IN AN ORGANIZED HEALTH CARE EDUCATION/TRAINING PROGRAM

## 2022-01-28 PROCEDURE — 700111 HCHG RX REV CODE 636 W/ 250 OVERRIDE (IP): Performed by: INTERNAL MEDICINE

## 2022-01-28 PROCEDURE — A9270 NON-COVERED ITEM OR SERVICE: HCPCS

## 2022-01-28 PROCEDURE — 80048 BASIC METABOLIC PNL TOTAL CA: CPT

## 2022-01-28 PROCEDURE — 94640 AIRWAY INHALATION TREATMENT: CPT

## 2022-01-28 PROCEDURE — 99239 HOSP IP/OBS DSCHRG MGMT >30: CPT | Mod: GC | Performed by: INTERNAL MEDICINE

## 2022-01-28 PROCEDURE — RXMED WILLOW AMBULATORY MEDICATION CHARGE: Performed by: STUDENT IN AN ORGANIZED HEALTH CARE EDUCATION/TRAINING PROGRAM

## 2022-01-28 PROCEDURE — 3E0234Z INTRODUCTION OF SERUM, TOXOID AND VACCINE INTO MUSCLE, PERCUTANEOUS APPROACH: ICD-10-PCS | Performed by: INTERNAL MEDICINE

## 2022-01-28 PROCEDURE — 97116 GAIT TRAINING THERAPY: CPT

## 2022-01-28 PROCEDURE — 83735 ASSAY OF MAGNESIUM: CPT

## 2022-01-28 PROCEDURE — 700102 HCHG RX REV CODE 250 W/ 637 OVERRIDE(OP): Performed by: NURSE PRACTITIONER

## 2022-01-28 PROCEDURE — 90662 IIV NO PRSV INCREASED AG IM: CPT | Performed by: INTERNAL MEDICINE

## 2022-01-28 PROCEDURE — 97530 THERAPEUTIC ACTIVITIES: CPT

## 2022-01-28 PROCEDURE — 36415 COLL VENOUS BLD VENIPUNCTURE: CPT

## 2022-01-28 PROCEDURE — 3E02340 INTRODUCTION OF INFLUENZA VACCINE INTO MUSCLE, PERCUTANEOUS APPROACH: ICD-10-PCS | Performed by: INTERNAL MEDICINE

## 2022-01-28 PROCEDURE — 94760 N-INVAS EAR/PLS OXIMETRY 1: CPT

## 2022-01-28 PROCEDURE — A9270 NON-COVERED ITEM OR SERVICE: HCPCS | Performed by: NURSE PRACTITIONER

## 2022-01-28 PROCEDURE — 700111 HCHG RX REV CODE 636 W/ 250 OVERRIDE (IP): Performed by: STUDENT IN AN ORGANIZED HEALTH CARE EDUCATION/TRAINING PROGRAM

## 2022-01-28 PROCEDURE — 84100 ASSAY OF PHOSPHORUS: CPT

## 2022-01-28 RX ORDER — LANOLIN ALCOHOL/MO/W.PET/CERES
100 CREAM (GRAM) TOPICAL DAILY
Qty: 30 TABLET | Refills: 3 | Status: SHIPPED | OUTPATIENT
Start: 2022-01-29 | End: 2022-01-28

## 2022-01-28 RX ORDER — BUDESONIDE AND FORMOTEROL FUMARATE DIHYDRATE 160; 4.5 UG/1; UG/1
1 AEROSOL RESPIRATORY (INHALATION) 2 TIMES DAILY
Qty: 2 EACH | Refills: 3 | Status: SHIPPED | OUTPATIENT
Start: 2022-01-28 | End: 2022-04-04

## 2022-01-28 RX ORDER — FOLIC ACID 1 MG/1
1 TABLET ORAL DAILY
Qty: 90 TABLET | Refills: 3 | Status: SHIPPED | OUTPATIENT
Start: 2022-01-29 | End: 2023-01-24

## 2022-01-28 RX ORDER — SPIRONOLACTONE 25 MG/1
12.5 TABLET ORAL DAILY
Qty: 45 TABLET | Refills: 3 | Status: SHIPPED | OUTPATIENT
Start: 2022-01-29 | End: 2023-01-24

## 2022-01-28 RX ORDER — BUDESONIDE AND FORMOTEROL FUMARATE DIHYDRATE 160; 4.5 UG/1; UG/1
2 AEROSOL RESPIRATORY (INHALATION) 2 TIMES DAILY
Qty: 10.2 G | Refills: 3 | Status: SHIPPED | OUTPATIENT
Start: 2022-01-28 | End: 2022-01-28

## 2022-01-28 RX ORDER — TIOTROPIUM BROMIDE 18 UG/1
18 CAPSULE ORAL; RESPIRATORY (INHALATION) DAILY
Qty: 90 CAPSULE | Refills: 3 | Status: SHIPPED | OUTPATIENT
Start: 2022-01-28 | End: 2022-01-28 | Stop reason: SDUPTHER

## 2022-01-28 RX ORDER — NICOTINE 21 MG/24HR
1 PATCH, TRANSDERMAL 24 HOURS TRANSDERMAL EVERY 24 HOURS
Qty: 15 PATCH | Refills: 0 | Status: SHIPPED | OUTPATIENT
Start: 2022-01-28 | End: 2022-02-12

## 2022-01-28 RX ORDER — CARVEDILOL 6.25 MG/1
6.25 TABLET ORAL 2 TIMES DAILY WITH MEALS
Qty: 60 TABLET | Refills: 0 | Status: SHIPPED | OUTPATIENT
Start: 2022-01-28 | End: 2022-01-28

## 2022-01-28 RX ORDER — SPIRONOLACTONE 25 MG/1
12.5 TABLET ORAL DAILY
Qty: 30 TABLET | Refills: 3 | Status: SHIPPED | OUTPATIENT
Start: 2022-01-29 | End: 2022-01-28

## 2022-01-28 RX ORDER — CARVEDILOL 6.25 MG/1
6.25 TABLET ORAL 2 TIMES DAILY WITH MEALS
Qty: 60 TABLET | Refills: 0 | Status: SHIPPED | OUTPATIENT
Start: 2022-01-28 | End: 2022-02-27

## 2022-01-28 RX ORDER — FUROSEMIDE 40 MG/1
20 TABLET ORAL DAILY
Qty: 30 TABLET | Refills: 0 | Status: SHIPPED | OUTPATIENT
Start: 2022-01-28 | End: 2022-01-28 | Stop reason: SDUPTHER

## 2022-01-28 RX ORDER — SPIRONOLACTONE 25 MG/1
TABLET ORAL
Status: COMPLETED
Start: 2022-01-28 | End: 2022-01-28

## 2022-01-28 RX ORDER — LANOLIN ALCOHOL/MO/W.PET/CERES
100 CREAM (GRAM) TOPICAL DAILY
Qty: 90 TABLET | Refills: 3 | Status: SHIPPED | OUTPATIENT
Start: 2022-01-29 | End: 2023-01-24

## 2022-01-28 RX ORDER — DOXYCYCLINE 100 MG/1
100 TABLET ORAL EVERY 12 HOURS
Qty: 3 TABLET | Refills: 0 | Status: SHIPPED | OUTPATIENT
Start: 2022-01-28 | End: 2022-01-30

## 2022-01-28 RX ORDER — TIOTROPIUM BROMIDE 18 UG/1
18 CAPSULE ORAL; RESPIRATORY (INHALATION) DAILY
Qty: 30 CAPSULE | Refills: 0 | Status: SHIPPED | OUTPATIENT
Start: 2022-01-28 | End: 2022-02-27

## 2022-01-28 RX ORDER — IBUPROFEN 200 MG
1 TABLET ORAL DAILY
Qty: 30 TABLET | Refills: 3 | Status: SHIPPED | OUTPATIENT
Start: 2022-01-29 | End: 2022-04-04

## 2022-01-28 RX ORDER — FUROSEMIDE 40 MG/1
20 TABLET ORAL DAILY
Qty: 45 TABLET | Refills: 3 | Status: SHIPPED | OUTPATIENT
Start: 2022-01-28 | End: 2023-01-23

## 2022-01-28 RX ORDER — FOLIC ACID 1 MG/1
1 TABLET ORAL DAILY
Qty: 30 TABLET | Refills: 3 | Status: SHIPPED | OUTPATIENT
Start: 2022-01-29 | End: 2022-01-28

## 2022-01-28 RX ADMIN — HEPARIN SODIUM 5000 UNITS: 5000 INJECTION, SOLUTION INTRAVENOUS; SUBCUTANEOUS at 04:28

## 2022-01-28 RX ADMIN — Medication 100 MG: at 04:28

## 2022-01-28 RX ADMIN — CARVEDILOL 6.25 MG: 6.25 TABLET, FILM COATED ORAL at 05:51

## 2022-01-28 RX ADMIN — PNEUMOCOCCAL VACCINE POLYVALENT 25 MCG
25; 25; 25; 25; 25; 25; 25; 25; 25; 25; 25; 25; 25; 25; 25; 25; 25; 25; 25; 25; 25; 25; 25 INJECTION, SOLUTION INTRAMUSCULAR; SUBCUTANEOUS at 14:04

## 2022-01-28 RX ADMIN — SPIRONOLACTONE 12.5 MG: 25 TABLET ORAL at 04:28

## 2022-01-28 RX ADMIN — BUDESONIDE AND FORMOTEROL FUMARATE DIHYDRATE 2 PUFF: 160; 4.5 AEROSOL RESPIRATORY (INHALATION) at 09:02

## 2022-01-28 RX ADMIN — DOXYCYCLINE 100 MG: 100 TABLET ORAL at 04:27

## 2022-01-28 RX ADMIN — FUROSEMIDE 40 MG: 10 INJECTION, SOLUTION INTRAMUSCULAR; INTRAVENOUS at 04:28

## 2022-01-28 RX ADMIN — THERA TABS 1 TABLET: TAB at 04:28

## 2022-01-28 RX ADMIN — LISINOPRIL 20 MG: 20 TABLET ORAL at 04:29

## 2022-01-28 RX ADMIN — NICOTINE 14 MG: 14 PATCH TRANSDERMAL at 04:28

## 2022-01-28 RX ADMIN — FOLIC ACID 1 MG: 1 TABLET ORAL at 04:28

## 2022-01-28 RX ADMIN — INFLUENZA A VIRUS A/VICTORIA/2570/2019 IVR-215 (H1N1) ANTIGEN (FORMALDEHYDE INACTIVATED), INFLUENZA A VIRUS A/TASMANIA/503/2020 IVR-221 (H3N2) ANTIGEN (FORMALDEHYDE INACTIVATED), INFLUENZA B VIRUS B/PHUKET/3073/2013 ANTIGEN (FORMALDEHYDE INACTIVATED), AND INFLUENZA B VIRUS B/WASHINGTON/02/2019 ANTIGEN (FORMALDEHYDE INACTIVATED) 0.7 ML: 60; 60; 60; 60 INJECTION, SUSPENSION INTRAMUSCULAR at 13:30

## 2022-01-28 RX ADMIN — FLUTICASONE PROPIONATE 100 MCG: 50 SPRAY, METERED NASAL at 04:27

## 2022-01-28 RX ADMIN — ATORVASTATIN CALCIUM 80 MG: 80 TABLET, FILM COATED ORAL at 04:28

## 2022-01-28 RX ADMIN — ASPIRIN 81 MG: 81 TABLET, COATED ORAL at 04:27

## 2022-01-28 RX ADMIN — PREDNISONE 40 MG: 20 TABLET ORAL at 04:27

## 2022-01-28 ASSESSMENT — GAIT ASSESSMENTS
GAIT LEVEL OF ASSIST: SUPERVISED
DEVIATION: NO DEVIATION
DISTANCE (FEET): 100

## 2022-01-28 ASSESSMENT — COGNITIVE AND FUNCTIONAL STATUS - GENERAL
WALKING IN HOSPITAL ROOM: A LITTLE
MOBILITY SCORE: 22
SUGGESTED CMS G CODE MODIFIER MOBILITY: CJ
CLIMB 3 TO 5 STEPS WITH RAILING: A LITTLE

## 2022-01-28 ASSESSMENT — PAIN DESCRIPTION - PAIN TYPE: TYPE: ACUTE PAIN

## 2022-01-28 NOTE — PROGRESS NOTES
Began care at 1845, Report received from Marita DEL ANGEL. Patient is SR/ST on monitor, on NC O2, A&Ox4, no pain. Pt declines need for straight cath at this time. Plan of care updated with the patient, no questions at this time. Initial assessment completed, orders reviewed, call light within reach, and fall precautions are in place.

## 2022-01-28 NOTE — DOCUMENTATION QUERY
[facility]                                                                       Query Response Note      PATIENT:               SALIMA WONG  ACCT #:                  [id number]  MRN:                     [id number]  :                      1940  ADMIT DATE:       2022 10:22 PM  DISCH DATE:          RESPONDING  PROVIDER #:        [id number]           QUERY TEXT:    It is unclear the status of the sepsis diagnosis. Sepsis is documented in the H&P, but not mentioned in subsequent MD PN. Leukocytosis only is documented starting on 22 without mention of the sepsis diagnosis. Can a clarification be made?    NOTE:  If an appropriate response is not listed below, please respond with a new note.      The patient's Clinical Indicators include:  H&P: Assessment/Plan:   -Sepsis- Resolving. SIRS 3/4 and qSOFA 1/3; UTI and possible pneumonia as well as COPD exacerbation   -Acute cystitis without hematuria- Presenting with sepsis, UA concerning for infection  Admit VS: Temp 98.6 F, Pulse 144, Resp 22  Admit lab results: WBC 9.9  22 CXR results: Bilateral lower lobe predominant pulmonary infiltrate  22 MD PN: Leukocytosis- Admission WBC WNL, with inc s/p steroids as above     22 MD PN:   -PNA lower lobes (eg procalcitonin);   -admit(  SIRS, QSOFA 1/3  pulm source, procalcitonin 0.06   GP;   -Impression: pulm source, LUIS FELIPE, resp failure  --> IV antibx  --> DCd, procalcitonin neg   22 CXR results: Increased bilateral reticular markings may be chronic in nature. Superimposed pneumonia or pulmonary edema cannot be excluded. Small bilateral pleural effusions.    Treatment: Unasyn, Azithromycin, Monitor labs, Blood cultures, Pulmonary consult, CXR, IVF infusion  Risk Factors: COPD, Acute on chronic combined HF, LUIS FELIPE, Acute on chronic respiratory failure with hypoxia     Thank you,  Haylee Ahumada RN, BSN  Clinical Documentation  Specialist  Connect via [employer]  .  Options provided:   -- Sepsis is ruled in, please document source of infection and additional clinical indicators   -- Sepsis has resolved, please document source of infection and additional clinical indicators   -- Sepsis has been ruled out   -- Other explanation of clinical findings, please clarify   -- Unable to determine      Query created by: Haylee Ahumada on 1/27/2022 12:00 PM    RESPONSE TEXT:    Sepsis has been ruled out          Electronically signed by:  FREDO LEVINE MD 1/27/2022 8:16 PM

## 2022-01-28 NOTE — THERAPY
Physical Therapy   Daily Treatment     Patient Name: Laron Doherty  Age:  81 y.o., Sex:  male  Medical Record #: [id number]  Today's Date: 1/28/2022     Precautions  Precautions: Fall Risk    Assessment      Pt seen for PT treatment with emphasis on home safety, fall prevention, ambulation with supplemental O2 pul cart. Pt demonstrated and verbalized understanding of teachings. Pt able to safely and timely ambulate for over 100 ft, no LOB or path deviation noted. Pt has FWW for home use as needed. Pt did not require FWW today but was instructed on to use as needed in and out of home. No further skilled inpatient PT needs.   Plan    Patient discharged from facility.    DC Equipment Recommendations: None  Discharge Recommendations: Anticipate that the patient will have no further physical therapy needs after discharge from the hospital         Objective       01/28/22 1241   Pain   Intervention Declines   Cognition    Level of Consciousness Alert   Active ROM Lower Body    Active ROM Lower Body  WDL   Strength Lower Body   Lower Body Strength  WDL   Other Treatments   Other Treatments Provided Extensive education on fall prevention including home set up and safety, use of pull cart for o2 tank, Use of FWW for mobility, sequencing for steps/stairs.   Balance   Sitting Balance (Static) Good   Sitting Balance (Dynamic) Good   Standing Balance (Static) Good   Standing Balance (Dynamic) Fair +   Weight Shift Sitting Good   Weight Shift Standing Good   Skilled Intervention Verbal Cuing   Comments w/FWW   Gait Analysis   Gait Level Of Assist Supervised   Assistive Device   (O2 pull cart)   Distance (Feet) 100   # of Times Distance was Traveled 2   Deviation No deviation   Weight Bearing Status no restrictions.    Skilled Intervention Verbal Cuing   Comments Pt instructed on O2 tank management, FWW management. demonstrated understanding.    Bed Mobility    Comments Up in chair pre and post session.    Functional Mobility   Sit to  Stand Supervised   Bed, Chair, Wheelchair Transfer Supervised   Mobility chair>hallway>chair   Skilled Intervention Verbal Cuing   How much help from another person does the patient currently need...   6 clicks Mobility Score 22   Activity Tolerance   Sitting in Chair > 1 hr   Standing 12 min    Short Term Goals    Short Term Goal # 1 in 6 visits pt to demo transfers mod-I with use of FWW in order to incresae fxnal IND   Goal Outcome # 1 Goal met   Short Term Goal # 2 in 6 visits pt to demo ambulation x50' with FWW in order to incresae fxnal IND   Goal Outcome # 2 Goal met   Short Term Goal # 3 in 6 visits pt to demo stair negotiation x10 with LRAD in order to increase fnxal IND   Goal Outcome # 3 Goal not met   Education Group   Role of Physical Therapist Patient Response Patient;Acceptance;Explanation;Verbal Demonstration

## 2022-01-28 NOTE — DISCHARGE PLANNING
LSW notified that pt does not have transportation home. LSW met with pt at bedside to give pt bus pass. Pt stated he is not able to take the bus due to having to walk 2 miles from the bus stop to home with O2 supplies. LSW provided cab voucher to [facility].

## 2022-01-28 NOTE — HEART FAILURE PROGRAM
New diagnosis of acute heart failure with reduced ejection fraction, HFrEF.     EF is 20%no ischemic w/u at this time per resident notes, due to patient not wanting any invasive procedures.  EF less than 35% for greater than 3 months: no  Residence: live  Insurance: medicare and [facility] Eligibility: no  Indication on facesheet for Hydralazine Hydrochloride/Isosorbide Dinitrate? no    Pneumococcal vaccine: on the MAR- messaged bedside [facility]  Influenza vaccine: messaged bedside [facility]  Tobacco Status: actively smoking  Cessation counseling: documented by Dr. Connelly on 1/26/22      DM dx: no  Atrial arrhythmia dx: no    Nurses: HF has been added as a title to the education tab and to the care plan. Would you please take credit for the great work that you're doing by documenting in these? Thank you!    Providers: below are Guideline Directed Medical Therapy (GDMT) for HFrEF. If any cannot be prescribed by discharge, would you please note the clinical reason for each in your discharge summary? Thanks! Dina  • Evidence Based Beta Blocker (bisoprolol, carvedilol, or toprol xl), for EF of 40% or less  • AMANDA - I, for EF of 40% or less ARNI is preferred If not cost prohibitive for patient  • SGLT2 inhibitor with proven ASCVD, HF, or DKD benefit (canagliflozin, dapagliflozin, or empagliflozin) If not cost prohibitive for patient  • Aldosterone antagonist, for EF of 35% or less, if applicable  • Anticoagulation for atrial arrhythmia, if applicable  • Glycemic control for DM + HF, if applicable  • Lipid lowering medication for DM + HF, if applicable  • Hydralazine Hydrochloride/Isosorbide Dinitrate. The combination of hydralazine and isosorbide- dinitrate is recommended to reduce morbidity and mortality for patients self-described  Americans with NYHA class III-IV HFrEF (EF 40% or less), receiving optimal therapy with ACE inhibitors and beta blockers, unless  contraindicated (Class I, JOHANNE: A).  • Pneumococcal vaccine, if not previously received  • Influenza vaccine for current season, if not previously received  • Smoking cessation counseling documented, if applicable  • Device screening, if applicable  • Referral to disease management program specializing in heart failure care- requested that schedulers notify me if patient cannot be scheduled at the [facility]

## 2022-01-28 NOTE — CARE PLAN
Problem: Nutritional:  Goal: Achieve adequate nutritional intake  Description: Patient will consume >50% of meals and supplements  1/28/2022 0526 by Maria T Sosa R.D.  Outcome: Met. Pt eating % of last four documented meals. RD to follow weekly to monitor PO intake/wt trends per department guidelines.

## 2022-01-28 NOTE — PROGRESS NOTES
Went over discharge instructions with patient. Patient understood where to  medications and understood medication regimen. Patient understood importance of followup appointments and where to go. Patient home with oxygen and wheelchair. Patient in wheelchair out to taxi with hospital escort.

## 2022-01-28 NOTE — DISCHARGE PLANNING
Meds-to-Beds: Discharge prescription orders listed below delivered to patient's bedside. [facility] notified. Patient counseled.      Other [facility]     Current Outpatient Medications   Medication Sig Dispense Refill   • doxycycline monohydrate (ADOXA) 100 MG tablet Take 1 Tablet by mouth every 12 hours for 3 doses. 3 Tablet 0   • [START ON 1/29/2022] Multiple Vitamin (ANTI-OXIDANT) Tab Take 1 Tablet by mouth every day. 30 Tablet 3      Dominga Fam, PharmD

## 2022-01-28 NOTE — CARE PLAN
The patient is Watcher - Medium risk of patient condition declining or worsening    Shift Goals  Clinical Goals: rest, straight cath  Patient Goals: comfort  Family Goals: N/A    Progress made toward(s) clinical / shift goals:    Problem: Knowledge Deficit - Standard  Goal: Patient and family/care givers will demonstrate understanding of plan of care, disease process/condition, diagnostic tests and medications  Outcome: Progressing     Problem: Fall Risk  Goal: Patient will remain free from falls  Outcome: Progressing     Problem: Skin Integrity  Goal: Skin integrity is maintained or improved  Outcome: Progressing

## 2022-01-28 NOTE — DISCHARGE INSTRUCTIONS
Discharge Instructions    Discharged to home by taxi with self. Discharged via wheelchair, hospital escort: Yes.  Special equipment needed: Oxygen    Be sure to schedule a follow-up appointment with your primary care doctor or any specialists as instructed.     Discharge Plan:   Diet Plan: Discussed  Activity Level: Discussed  Confirmed Follow up Appointment: Patient to Call and Schedule Appointment  Confirmed Symptoms Management: Discussed  Medication Reconciliation Updated: Yes  Influenza Vaccine Indication: Indicated: 9 to 64 years of age  Influenza Vaccine Given - only chart on this line when given: Influenza Vaccine Given (See MAR)    I understand that a diet low in cholesterol, fat, and sodium is recommended for good health. Unless I have been given specific instructions below for another diet, I accept this instruction as my diet prescription.   Other diet: Regular, Cardiac    Special Instructions:   HF Patient Discharge Instructions  · Monitor your weight daily, and maintain a weight chart, to track your weight changes.   · Activity as tolerated, unless your Doctor has ordered otherwise. Other activity order: As tolerated  · Follow a low fat, low cholesterol, low salt diet unless instructed otherwise by your Doctor. Read the labels on the back of food products and track your intake of fat, cholesterol and salt.   · Fluid Restriction No. If a Fluid Restriction has been ordered by your Doctor, measure fluids with a measuring cup to ensure that you are not exceeding the restriction.   · No smoking.  · Oxygen Yes. If your Doctor has ordered that you wear Oxygen at home, it is important to wear it as ordered.  · Did you receive an explanation from staff on the importance of taking each of your medications and why it is necessary to keep taking them unless your doctor says to stop? Yes  · Were all of your questions answered about how to manage your heart failure and what to do if you have increased signs and  symptoms after you go home? Yes  · Do you feel like your heart failure care team involved you in the care treatment plan and allowed you to make decisions regarding your care while in the hospital and addressed any discharge needs you might have? Yes    See the educational handout provided at discharge for more information on monitoring your daily weight, activity and diet. This also explains more about Heart Failure, symptoms of a flare-up and some of the tests that you have undergone.     Warning Signs of a Flare-Up include:  · Swelling in the ankles or lower legs.  · Shortness of breath, while at rest, or while doing normal activities.   · Shortness of breath at night when in bed, or coughing in bed.   · Requiring more pillows to sleep at night, or needing to sit up at night to sleep.  · Feeling weak, dizzy or fatigued.     When to call your Doctor:  · Call [facility] seven days a week from 8:00 a.m. to 8:00 p.m. for medical questions [phone].  · Call your Primary Care Physician or Cardiologist if:   1. You experience any pain radiating to your jaw or neck.  2. You have any difficulty breathing.  3. You experience weight gain of 3 lbs in a day or 5 lbs in a week.   4. You feel any palpitations or irregular heartbeats.  5. You become dizzy or lose consciousness.   If you have had an angiogram or had a pacemaker or AICD placed, and experience:  1. Bleeding, drainage or swelling at the surgical / puncture site.  2. Fever greater than 100.0 F  3. Shock from internal defibrillator.  4. Cool and / or numb extremities.      · Is patient discharged on Warfarin / Coumadin?   No     Depression / Suicide Risk    As you are discharged from this [facility], it is important to learn how to keep safe from harming yourself.    Recognize the warning signs:  · Abrupt changes in personality, positive or negative- including increase in energy   · Giving away possessions  · Change in eating patterns-  significant weight changes-  positive or negative  · Change in sleeping patterns- unable to sleep or sleeping all the time   · Unwillingness or inability to communicate  · Depression  · Unusual sadness, discouragement and loneliness  · Talk of wanting to die  · Neglect of personal appearance   · Rebelliousness- reckless behavior  · Withdrawal from people/activities they love  · Confusion- inability to concentrate     If you or a loved one observes any of these behaviors or has concerns about self-harm, here's what you can do:  · Talk about it- your feelings and reasons for harming yourself  · Remove any means that you might use to hurt yourself (examples: pills, rope, extension cords, firearm)  · Get professional help from the community (Mental Health, Substance Abuse, psychological counseling)  · Do not be alone:Call your Safe Contact- someone whom you trust who will be there for you.  · Call your local CRISIS HOTLINE 991-9574 or 144-098-7335  · Call your local Children's Mobile Crisis Response Team [place] (059) 816-0820 or www.[employer]  · Call the toll free National Suicide Prevention Hotlines   · National Suicide Prevention Lifeline 774-373-BHDI (2177)  · National Hope Line Network 800-SUICIDE (602-6208)

## 2022-01-28 NOTE — DISCHARGE PLANNING
Agency/Facility Name: [facility]  Spoke To: Yakelin   Outcome: Oxygen has been delivered bedside, DME setup is complete. Pt is on service.

## 2022-01-28 NOTE — DISCHARGE INSTR - SUPPLEMENTARY INSTRUCTIONS
"MY COPD ACTION PLAN      It is recommended that patients and physicians /healthcare providers complete this action plan together. This plan should be discussed at each physician visit and updated as needed.     The green, yellow and red zones show groups of symptoms of COPD. This list of symptoms is not comprehensive, and you may experience other symptoms. In the \"Actions\" column, your healthcare provider has recommended actions for you to take based on your symptoms.     Patient Name: Laron Doherty   YOB: 1940   Last Updated on: 1/26/2022 10:05 AM               Green Zone:  I am doing well today Actions     Usual activitiy and exercise level   Take daily medications     Usual amounts of cough and phlegm/mucus   Use oxygen as prescribed     Sleep well at night   Continue regular exercise/diet plan     Appetite is good   At all times avoid cigarette smoke, inhaled irritants      Daily Medications (these medications are taken every day):   Tiotropium Bromide Monohydrate (Spiriva)  Budesonide-Formoterol Fumarate (Symbicort) 1 capsule  2 Puffs Once daily  Twice daily      Additional Information:  Inhale one capsule through the Spiriva Handihaler once daily.     Use a spacer with the Symbicort inhaler, and rinse your mouth after taking.     Yellow Zone:  I am having a bad day or a COPD flare Actions     More breathless than usual   Continue daily medications     I have less energy for my daily activities   Use quick relief inhaler as ordered     Increased or thicker phlegm/mucus   Use oxygen as prescribed     Using quick relief inhaler/nebulizer more often   Get plenty of rest     Swelling of ankles more than usual   Use pursed lip breathing     More coughing than usual   At all times avoid cigarette smoke, inhaled irritants     I feel like I have a \"chest cold\"     Poor sleep and my symptoms woke me up     My appetite is not good     My medicine is not helping      Call provider immediately if symptoms " don’t improve      Continue daily medications, add rescue medications:   Albuterol 2 Puffs Every 6 hours PRN         Medications to be used during a flare up, (as Discussed with Provider):            Additional Information:  Use with the spacer.     Red Zone:  I need urgent medical care Actions     Severe shortness of breath even at rest   Call 911 or seek medical care immediately     Not able to do any activity because of breathing       Fever or shaking chills       Feeling confused or very drowsy        Chest pains       Coughing up blood

## 2022-01-29 RX ORDER — ALBUTEROL SULFATE 90 UG/1
2 AEROSOL, METERED RESPIRATORY (INHALATION) EVERY 6 HOURS PRN
Qty: 8.5 EACH | Refills: 6 | Status: ON HOLD | OUTPATIENT
Start: 2022-01-29 | End: 2023-01-06 | Stop reason: SDUPTHER

## 2022-01-31 LAB
BACTERIA BLD CULT: NORMAL
BACTERIA BLD CULT: NORMAL
SIGNIFICANT IND 70042: NORMAL
SIGNIFICANT IND 70042: NORMAL
SITE SITE: NORMAL
SITE SITE: NORMAL
SOURCE SOURCE: NORMAL
SOURCE SOURCE: NORMAL
VIT B1 BLD-MCNC: 197 NMOL/L (ref 70–180)

## 2022-04-04 ENCOUNTER — HOSPITAL ENCOUNTER (INPATIENT)
Facility: MEDICAL CENTER | Age: 82
LOS: 2 days | DRG: 291 | End: 2022-04-06
Attending: EMERGENCY MEDICINE | Admitting: INTERNAL MEDICINE
Payer: COMMERCIAL

## 2022-04-04 ENCOUNTER — APPOINTMENT (OUTPATIENT)
Dept: RADIOLOGY | Facility: MEDICAL CENTER | Age: 82
DRG: 291 | End: 2022-04-04
Attending: EMERGENCY MEDICINE
Payer: COMMERCIAL

## 2022-04-04 DIAGNOSIS — I34.0 SEVERE MITRAL REGURGITATION: ICD-10-CM

## 2022-04-04 DIAGNOSIS — J44.9 CHRONIC OBSTRUCTIVE PULMONARY DISEASE, UNSPECIFIED COPD TYPE (HCC): ICD-10-CM

## 2022-04-04 DIAGNOSIS — I50.23 ACUTE ON CHRONIC SYSTOLIC CHF (CONGESTIVE HEART FAILURE) (HCC): ICD-10-CM

## 2022-04-04 PROBLEM — N18.30 CHRONIC KIDNEY DISEASE, STAGE III (MODERATE): Status: ACTIVE | Noted: 2022-04-04

## 2022-04-04 PROBLEM — E11.65 TYPE 2 DIABETES MELLITUS WITH HYPERGLYCEMIA, WITHOUT LONG-TERM CURRENT USE OF INSULIN (HCC): Status: ACTIVE | Noted: 2022-04-04

## 2022-04-04 LAB
ALBUMIN SERPL BCP-MCNC: 4.4 G/DL (ref 3.2–4.9)
ALBUMIN/GLOB SERPL: 1.8 G/DL
ALP SERPL-CCNC: 122 U/L (ref 30–99)
ALT SERPL-CCNC: 5 U/L (ref 2–50)
ANION GAP SERPL CALC-SCNC: 18 MMOL/L (ref 7–16)
AST SERPL-CCNC: 12 U/L (ref 12–45)
BASE EXCESS BLDA CALC-SCNC: -12 MMOL/L (ref -4–3)
BASOPHILS # BLD AUTO: 0.8 % (ref 0–1.8)
BASOPHILS # BLD: 0.11 K/UL (ref 0–0.12)
BILIRUB SERPL-MCNC: 0.4 MG/DL (ref 0.1–1.5)
BODY TEMPERATURE: ABNORMAL DEGREES
BUN SERPL-MCNC: 27 MG/DL (ref 8–22)
CALCIUM SERPL-MCNC: 9.3 MG/DL (ref 8.5–10.5)
CHLORIDE SERPL-SCNC: 104 MMOL/L (ref 96–112)
CO2 BLDA-SCNC: 14 MMOL/L (ref 20–33)
CO2 SERPL-SCNC: 15 MMOL/L (ref 20–33)
CREAT SERPL-MCNC: 1.56 MG/DL (ref 0.5–1.4)
DELSYS IDSYS: ABNORMAL
EOSINOPHIL # BLD AUTO: 0.27 K/UL (ref 0–0.51)
EOSINOPHIL NFR BLD: 1.9 % (ref 0–6.9)
ERYTHROCYTE [DISTWIDTH] IN BLOOD BY AUTOMATED COUNT: 49 FL (ref 35.9–50)
FLUAV RNA SPEC QL NAA+PROBE: NEGATIVE
FLUBV RNA SPEC QL NAA+PROBE: NEGATIVE
GFR SERPLBLD CREATININE-BSD FMLA CKD-EPI: 44 ML/MIN/1.73 M 2
GLOBULIN SER CALC-MCNC: 2.5 G/DL (ref 1.9–3.5)
GLUCOSE SERPL-MCNC: 359 MG/DL (ref 65–99)
HCO3 BLDA-SCNC: 13.3 MMOL/L (ref 17–25)
HCT VFR BLD AUTO: 53 % (ref 42–52)
HGB BLD-MCNC: 16.8 G/DL (ref 14–18)
HOROWITZ INDEX BLDA+IHG-RTO: 119 MM[HG]
IMM GRANULOCYTES # BLD AUTO: 0.08 K/UL (ref 0–0.11)
IMM GRANULOCYTES NFR BLD AUTO: 0.6 % (ref 0–0.9)
LACTATE BLD-SCNC: 0.8 MMOL/L (ref 0.5–2)
LACTATE BLD-SCNC: 3.8 MMOL/L (ref 0.5–2)
LYMPHOCYTES # BLD AUTO: 3.89 K/UL (ref 1–4.8)
LYMPHOCYTES NFR BLD: 27.5 % (ref 22–41)
MCH RBC QN AUTO: 30.7 PG (ref 27–33)
MCHC RBC AUTO-ENTMCNC: 31.7 G/DL (ref 33.7–35.3)
MCV RBC AUTO: 96.7 FL (ref 81.4–97.8)
MONOCYTES # BLD AUTO: 1.01 K/UL (ref 0–0.85)
MONOCYTES NFR BLD AUTO: 7.2 % (ref 0–13.4)
NEUTROPHILS # BLD AUTO: 8.76 K/UL (ref 1.82–7.42)
NEUTROPHILS NFR BLD: 62 % (ref 44–72)
NRBC # BLD AUTO: 0 K/UL
NRBC BLD-RTO: 0 /100 WBC
NT-PROBNP SERPL IA-MCNC: ABNORMAL PG/ML (ref 0–125)
O2/TOTAL GAS SETTING VFR VENT: 80 %
PCO2 BLDA: 28.4 MMHG (ref 26–37)
PH BLDA: 7.28 [PH] (ref 7.4–7.5)
PLATELET # BLD AUTO: 413 K/UL (ref 164–446)
PMV BLD AUTO: 11.2 FL (ref 9–12.9)
PO2 BLDA: 95 MMHG (ref 64–87)
POTASSIUM SERPL-SCNC: 5.2 MMOL/L (ref 3.6–5.5)
PROCALCITONIN SERPL-MCNC: 0.13 NG/ML
PROT SERPL-MCNC: 6.9 G/DL (ref 6–8.2)
RBC # BLD AUTO: 5.48 M/UL (ref 4.7–6.1)
RSV RNA SPEC QL NAA+PROBE: NEGATIVE
SAO2 % BLDA: 97 % (ref 93–99)
SARS-COV-2 RNA RESP QL NAA+PROBE: NOTDETECTED
SODIUM SERPL-SCNC: 137 MMOL/L (ref 135–145)
SPECIMEN DRAWN FROM PATIENT: ABNORMAL
SPECIMEN SOURCE: NORMAL
TROPONIN T SERPL-MCNC: 27 NG/L (ref 6–19)
TROPONIN T SERPL-MCNC: 39 NG/L (ref 6–19)
WBC # BLD AUTO: 14.1 K/UL (ref 4.8–10.8)

## 2022-04-04 PROCEDURE — A9270 NON-COVERED ITEM OR SERVICE: HCPCS | Performed by: HOSPITALIST

## 2022-04-04 PROCEDURE — 99223 1ST HOSP IP/OBS HIGH 75: CPT | Mod: AI | Performed by: HOSPITALIST

## 2022-04-04 PROCEDURE — 84145 PROCALCITONIN (PCT): CPT

## 2022-04-04 PROCEDURE — 82803 BLOOD GASES ANY COMBINATION: CPT

## 2022-04-04 PROCEDURE — 770000 HCHG ROOM/CARE - INTERMEDIATE ICU *

## 2022-04-04 PROCEDURE — 71045 X-RAY EXAM CHEST 1 VIEW: CPT

## 2022-04-04 PROCEDURE — 96374 THER/PROPH/DIAG INJ IV PUSH: CPT

## 2022-04-04 PROCEDURE — C9803 HOPD COVID-19 SPEC COLLECT: HCPCS | Performed by: EMERGENCY MEDICINE

## 2022-04-04 PROCEDURE — 700102 HCHG RX REV CODE 250 W/ 637 OVERRIDE(OP): Performed by: HOSPITALIST

## 2022-04-04 PROCEDURE — 80053 COMPREHEN METABOLIC PANEL: CPT

## 2022-04-04 PROCEDURE — 94640 AIRWAY INHALATION TREATMENT: CPT

## 2022-04-04 PROCEDURE — 82962 GLUCOSE BLOOD TEST: CPT

## 2022-04-04 PROCEDURE — 83880 ASSAY OF NATRIURETIC PEPTIDE: CPT

## 2022-04-04 PROCEDURE — 94660 CPAP INITIATION&MGMT: CPT

## 2022-04-04 PROCEDURE — 99285 EMERGENCY DEPT VISIT HI MDM: CPT

## 2022-04-04 PROCEDURE — 96376 TX/PRO/DX INJ SAME DRUG ADON: CPT

## 2022-04-04 PROCEDURE — 700111 HCHG RX REV CODE 636 W/ 250 OVERRIDE (IP): Performed by: EMERGENCY MEDICINE

## 2022-04-04 PROCEDURE — 87040 BLOOD CULTURE FOR BACTERIA: CPT

## 2022-04-04 PROCEDURE — 84484 ASSAY OF TROPONIN QUANT: CPT

## 2022-04-04 PROCEDURE — 85025 COMPLETE CBC W/AUTO DIFF WBC: CPT

## 2022-04-04 PROCEDURE — 0241U HCHG SARS-COV-2 COVID-19 NFCT DS RESP RNA 4 TRGT MIC: CPT

## 2022-04-04 PROCEDURE — 83605 ASSAY OF LACTIC ACID: CPT

## 2022-04-04 PROCEDURE — 700111 HCHG RX REV CODE 636 W/ 250 OVERRIDE (IP): Performed by: HOSPITALIST

## 2022-04-04 PROCEDURE — 36600 WITHDRAWAL OF ARTERIAL BLOOD: CPT

## 2022-04-04 PROCEDURE — 36415 COLL VENOUS BLD VENIPUNCTURE: CPT

## 2022-04-04 RX ORDER — HYDRALAZINE HYDROCHLORIDE 20 MG/ML
10 INJECTION INTRAMUSCULAR; INTRAVENOUS EVERY 4 HOURS PRN
Status: DISCONTINUED | OUTPATIENT
Start: 2022-04-04 | End: 2022-04-06 | Stop reason: HOSPADM

## 2022-04-04 RX ORDER — POLYETHYLENE GLYCOL 3350 17 G/17G
1 POWDER, FOR SOLUTION ORAL
Status: DISCONTINUED | OUTPATIENT
Start: 2022-04-04 | End: 2022-04-06 | Stop reason: HOSPADM

## 2022-04-04 RX ORDER — SPIRONOLACTONE 25 MG/1
12.5 TABLET ORAL DAILY
Status: DISCONTINUED | OUTPATIENT
Start: 2022-04-05 | End: 2022-04-06 | Stop reason: HOSPADM

## 2022-04-04 RX ORDER — LORAZEPAM 2 MG/ML
0.5 INJECTION INTRAMUSCULAR
Status: DISCONTINUED | OUTPATIENT
Start: 2022-04-04 | End: 2022-04-06 | Stop reason: HOSPADM

## 2022-04-04 RX ORDER — GAUZE BANDAGE 2" X 2"
100 BANDAGE TOPICAL DAILY
Status: DISCONTINUED | OUTPATIENT
Start: 2022-04-05 | End: 2022-04-06 | Stop reason: HOSPADM

## 2022-04-04 RX ORDER — ALBUTEROL SULFATE 90 UG/1
2 AEROSOL, METERED RESPIRATORY (INHALATION) EVERY 6 HOURS PRN
Status: DISCONTINUED | OUTPATIENT
Start: 2022-04-04 | End: 2022-04-06 | Stop reason: HOSPADM

## 2022-04-04 RX ORDER — FOLIC ACID 1 MG/1
1 TABLET ORAL DAILY
Status: DISCONTINUED | OUTPATIENT
Start: 2022-04-05 | End: 2022-04-06 | Stop reason: HOSPADM

## 2022-04-04 RX ORDER — TAMSULOSIN HYDROCHLORIDE 0.4 MG/1
0.4 CAPSULE ORAL EVERY EVENING
Status: DISCONTINUED | OUTPATIENT
Start: 2022-04-04 | End: 2022-04-06 | Stop reason: HOSPADM

## 2022-04-04 RX ORDER — FUROSEMIDE 10 MG/ML
40 INJECTION INTRAMUSCULAR; INTRAVENOUS DAILY
Status: DISCONTINUED | OUTPATIENT
Start: 2022-04-04 | End: 2022-04-06 | Stop reason: HOSPADM

## 2022-04-04 RX ORDER — BISACODYL 10 MG
10 SUPPOSITORY, RECTAL RECTAL
Status: DISCONTINUED | OUTPATIENT
Start: 2022-04-04 | End: 2022-04-06 | Stop reason: HOSPADM

## 2022-04-04 RX ORDER — ONDANSETRON 2 MG/ML
4 INJECTION INTRAMUSCULAR; INTRAVENOUS EVERY 4 HOURS PRN
Status: DISCONTINUED | OUTPATIENT
Start: 2022-04-04 | End: 2022-04-06 | Stop reason: HOSPADM

## 2022-04-04 RX ORDER — FUROSEMIDE 10 MG/ML
40 INJECTION INTRAMUSCULAR; INTRAVENOUS ONCE
Status: COMPLETED | OUTPATIENT
Start: 2022-04-04 | End: 2022-04-04

## 2022-04-04 RX ORDER — AMOXICILLIN 250 MG
2 CAPSULE ORAL 2 TIMES DAILY
Status: DISCONTINUED | OUTPATIENT
Start: 2022-04-04 | End: 2022-04-06 | Stop reason: HOSPADM

## 2022-04-04 RX ORDER — LISINOPRIL 20 MG/1
20 TABLET ORAL 2 TIMES DAILY
Status: DISCONTINUED | OUTPATIENT
Start: 2022-04-04 | End: 2022-04-06 | Stop reason: HOSPADM

## 2022-04-04 RX ORDER — TIOTROPIUM BROMIDE INHALATION SPRAY 3.12 UG/1
5 SPRAY, METERED RESPIRATORY (INHALATION) DAILY
Status: ON HOLD | COMMUNITY
End: 2023-01-06 | Stop reason: SDUPTHER

## 2022-04-04 RX ORDER — CARVEDILOL 6.25 MG/1
6.25 TABLET ORAL 2 TIMES DAILY WITH MEALS
Status: DISCONTINUED | OUTPATIENT
Start: 2022-04-04 | End: 2022-04-06 | Stop reason: HOSPADM

## 2022-04-04 RX ORDER — ACETAMINOPHEN 325 MG/1
650 TABLET ORAL EVERY 6 HOURS PRN
Status: DISCONTINUED | OUTPATIENT
Start: 2022-04-04 | End: 2022-04-06 | Stop reason: HOSPADM

## 2022-04-04 RX ORDER — ONDANSETRON 4 MG/1
4 TABLET, ORALLY DISINTEGRATING ORAL EVERY 4 HOURS PRN
Status: DISCONTINUED | OUTPATIENT
Start: 2022-04-04 | End: 2022-04-06 | Stop reason: HOSPADM

## 2022-04-04 RX ORDER — ATORVASTATIN CALCIUM 40 MG/1
40 TABLET, FILM COATED ORAL EVERY EVENING
Status: DISCONTINUED | OUTPATIENT
Start: 2022-04-04 | End: 2022-04-06 | Stop reason: HOSPADM

## 2022-04-04 RX ORDER — BUDESONIDE AND FORMOTEROL FUMARATE DIHYDRATE 160; 4.5 UG/1; UG/1
2 AEROSOL RESPIRATORY (INHALATION)
Status: DISCONTINUED | OUTPATIENT
Start: 2022-04-04 | End: 2022-04-06 | Stop reason: HOSPADM

## 2022-04-04 RX ORDER — CARVEDILOL 6.25 MG/1
6.25 TABLET ORAL 2 TIMES DAILY WITH MEALS
COMMUNITY

## 2022-04-04 RX ADMIN — INSULIN HUMAN 2 UNITS: 100 INJECTION, SOLUTION PARENTERAL at 22:12

## 2022-04-04 RX ADMIN — LISINOPRIL 20 MG: 20 TABLET ORAL at 18:09

## 2022-04-04 RX ADMIN — TIOTROPIUM BROMIDE INHALATION SPRAY 5 MCG: 3.12 SPRAY, METERED RESPIRATORY (INHALATION) at 18:12

## 2022-04-04 RX ADMIN — FUROSEMIDE 40 MG: 10 INJECTION, SOLUTION INTRAMUSCULAR; INTRAVENOUS at 10:54

## 2022-04-04 RX ADMIN — FUROSEMIDE 40 MG: 10 INJECTION, SOLUTION INTRAMUSCULAR; INTRAVENOUS at 14:23

## 2022-04-04 RX ADMIN — BUDESONIDE AND FORMOTEROL FUMARATE DIHYDRATE 2 PUFF: 160; 4.5 AEROSOL RESPIRATORY (INHALATION) at 18:15

## 2022-04-04 RX ADMIN — ASPIRIN 81 MG: 81 TABLET, COATED ORAL at 18:09

## 2022-04-04 RX ADMIN — CARVEDILOL 6.25 MG: 6.25 TABLET, FILM COATED ORAL at 18:09

## 2022-04-04 RX ADMIN — ATORVASTATIN CALCIUM 40 MG: 40 TABLET, FILM COATED ORAL at 18:09

## 2022-04-04 ASSESSMENT — PAIN DESCRIPTION - PAIN TYPE
TYPE: ACUTE PAIN
TYPE: ACUTE PAIN

## 2022-04-04 ASSESSMENT — PATIENT HEALTH QUESTIONNAIRE - PHQ9
SUM OF ALL RESPONSES TO PHQ9 QUESTIONS 1 AND 2: 1
2. FEELING DOWN, DEPRESSED, IRRITABLE, OR HOPELESS: SEVERAL DAYS
1. LITTLE INTEREST OR PLEASURE IN DOING THINGS: NOT AT ALL

## 2022-04-04 ASSESSMENT — LIFESTYLE VARIABLES
HAVE YOU EVER FELT YOU SHOULD CUT DOWN ON YOUR DRINKING: NO
AVERAGE NUMBER OF DAYS PER WEEK YOU HAVE A DRINK CONTAINING ALCOHOL: 7
CONSUMPTION TOTAL: NEGATIVE
ALCOHOL_USE: YES
EVER HAD A DRINK FIRST THING IN THE MORNING TO STEADY YOUR NERVES TO GET RID OF A HANGOVER: NO
TOTAL SCORE: 0
EVER FELT BAD OR GUILTY ABOUT YOUR DRINKING: NO
TOTAL SCORE: 0
ON A TYPICAL DAY WHEN YOU DRINK ALCOHOL HOW MANY DRINKS DO YOU HAVE: 1
HOW MANY TIMES IN THE PAST YEAR HAVE YOU HAD 5 OR MORE DRINKS IN A DAY: 0
DOES PATIENT WANT TO STOP DRINKING: NO
TOTAL SCORE: 0
HAVE PEOPLE ANNOYED YOU BY CRITICIZING YOUR DRINKING: NO

## 2022-04-04 ASSESSMENT — COPD QUESTIONNAIRES
COPD SCREENING SCORE: 7
DURING THE PAST 4 WEEKS HOW MUCH DID YOU FEEL SHORT OF BREATH: SOME OF THE TIME
HAVE YOU SMOKED AT LEAST 100 CIGARETTES IN YOUR ENTIRE LIFE: YES
DO YOU EVER COUGH UP ANY MUCUS OR PHLEGM?: YES, A FEW DAYS A WEEK OR MONTH

## 2022-04-04 ASSESSMENT — FIBROSIS 4 INDEX
FIB4 SCORE: 1.05
FIB4 SCORE: 2.77

## 2022-04-04 ASSESSMENT — ENCOUNTER SYMPTOMS
SHORTNESS OF BREATH: 1
COUGH: 1
ORTHOPNEA: 1

## 2022-04-04 ASSESSMENT — PULMONARY FUNCTION TESTS: EPAP_CMH2O: 10

## 2022-04-04 NOTE — PROGRESS NOTES
This patient has been admitted to the [facility] for acute on chronic hypoxic respiratory failure and remains under the care of hospitalist (who all questions and concerns should be relayed to).  While a critical care consultation has not been requested, we are immediately available if the patient requires critical care in the future.

## 2022-04-04 NOTE — ASSESSMENT & PLAN NOTE
With underlying severe mitral regurgitation     Continue IV Lasix and monitor intake and output  Continue carvedilol losartan and Aldactone  [facility] did not get to make appointment yet  Previously evaluated by cardiology at our facility and declined coronary angiogram  Reinforce smoking cessation and lifestyle changes

## 2022-04-04 NOTE — ASSESSMENT & PLAN NOTE
Noted on previous ultrasound  Reviewed findings with patient and discussed importance of outpatient follow-up he reports that [facility] was setting [facility] up for urology

## 2022-04-04 NOTE — ED PROVIDER NOTES
ED Provider Note    CHIEF COMPLAINT  Chief Complaint   Patient presents with   • Shortness of Breath       HPI  Laron Doherty is a 81 y.o. male who presents for evaluation of shortness of breath wheezing hypoxia.  The patient was brought in by paramedics.  He had been struggling all night for cough and shortness of breath.  He was noted to be profoundly hypoxic in the field and placed on CPAP.  He was also transiently hypotensive but not endorsing any chest pain.  Patient apparently has a history of undiagnosed and untreated COPD.  No associated leg swelling or hemoptysis.  Paramedics noted significant wheezes and started him on CPAP as well as gave him 125 mg of Solu-Medrol and continuous DuoNeb.    REVIEW OF SYSTEMS  See HPI for further details.  Positive for cough wheezing shortness of breath all other systems are negative.     PAST MEDICAL HISTORY  Past Medical History:   Diagnosis Date   • Benign tumor of brain (HCC)        FAMILY HISTORY  Noncontributory    SOCIAL HISTORY  Social History     Socioeconomic History   • Marital status:    Tobacco Use   • Smoking status: Current Every Day Smoker   Substance and Sexual Activity   • Alcohol use: Yes   • Drug use: No     Social Determinants of Health     Financial Resource Strain: High Risk   • Difficulty of Paying Living Expenses: Hard   Food Insecurity: Food Insecurity Present   • Worried About Running Out of Food in the Last Year: Sometimes true   • Ran Out of Food in the Last Year: Sometimes true   Transportation Needs: No Transportation Needs   • Lack of Transportation (Medical): No   • Lack of Transportation (Non-Medical): No   Housing Stability: High Risk   • Unable to Pay for Housing in the Last Year: Yes   • Unstable Housing in the Last Year: No   Extensive smoking history    SURGICAL HISTORY  Past Surgical History:   Procedure Laterality Date   • OTHER ORTHOPEDIC SURGERY         CURRENT MEDICATIONS    Current Facility-Administered Medications:   •   LORazepam (ATIVAN) injection 0.5 mg, 0.5 mg, Intravenous, Q HOUR PRN, Carlos Alberto Dickerson M.D.    Current Outpatient Medications:   •  tiotropium (SPIRIVA RESPIMAT) 2.5 mcg/Act Aero Soln, Inhale 5 mcg every day., Disp: , Rfl:   •  fluticasone-salmeterol (WIXELA INHUB) 250-50 MCG/DOSE AEROSOL POWDER, BREATH ACTIVATED, Inhale 1 Puff every 12 hours., Disp: , Rfl:   •  carvedilol (COREG) 6.25 MG Tab, Take 6.25 mg by mouth 2 times a day with meals., Disp: , Rfl:   •  albuterol 108 (90 Base) MCG/ACT Aero Soln inhalation aerosol, Inhale 2 Puffs every 6 hours as needed for Shortness of Breath for up to 360 days., Disp: 8.5 Each, Rfl: 6  •  spironolactone (ALDACTONE) 25 MG Tab, Take 0.5 Tablet by mouth every day., Disp: 45 Tablet, Rfl: 3  •  thiamine (THIAMINE) 100 MG tablet, Take 1 Tablet by mouth every day for 360 days., Disp: 90 Tablet, Rfl: 3  •  folic acid (FOLVITE) 1 MG Tab, Take 1 Tablet by mouth every day for 360 days., Disp: 90 Tablet, Rfl: 3  •  furosemide (LASIX) 40 MG Tab, Take 0.5 Tablet by mouth every day for 30 days. Do not take this medication if you feel dizzy, Disp: 45 Tablet, Rfl: 3  •  tamsulosin (FLOMAX) 0.4 MG capsule, Take 0.4 mg by mouth every evening., Disp: , Rfl:   •  aspirin EC (ECOTRIN) 81 MG Tablet Delayed Response, Take 81 mg by mouth every evening., Disp: , Rfl:   •  lisinopril (PRINIVIL) 20 MG Tab, Take 20 mg by mouth 2 times a day., Disp: , Rfl:   •  atorvastatin (LIPITOR) 40 MG Tab, Take 40 mg by mouth every day., Disp: , Rfl:     ALLERGIES  No Known Allergies    PHYSICAL EXAM  VITAL SIGNS: /103   Pulse 86   Temp 36.5 °C (97.7 °F) (Temporal)   Resp 17   Wt 53 kg (116 lb 13.5 oz)   SpO2 98%   BMI 15.85 kg/m²       Constitutional: Elderly cachectic ill-appearing on BiPAP  HENT: Normocephalic, Atraumatic, Bilateral external ears normal, Oropharynx moist, No oral exudates, Nose normal.   Eyes: PERRLA, EOMI, Conjunctiva normal, No discharge.   Neck: Normal range of motion, No  tenderness, Supple, No stridor.    Cardiovascular: Normal heart rate, Normal rhythm, No murmurs, No rubs, No gallops.   Thorax & Lungs: Bibasilar rhonchi with wheezing, No chest tenderness.   Abdomen: Bowel sounds normal, Soft, No tenderness, No masses, No pulsatile masses.   Skin: Warm, Dry, No erythema, No rash.   Extremities: Intact distal pulses, No edema, No tenderness, No cyanosis, No clubbing.   Neurologic: Alert & oriented x 3, Normal motor function, Normal sensory function, No focal deficits noted.   Psychiatric: Sluggish        RADIOLOGY/PROCEDURES  DX-CHEST-PORTABLE (1 VIEW)   Final Result      1.  Bilateral reticular opacities appear chronic in nature and are consistent with interstitial lung disease. Superimposed interstitial edema or pneumonia is suspected.      2.  Probable small bilateral pleural effusions.        Results for orders placed or performed during the hospital encounter of 04/04/22   CBC With Differential   Result Value Ref Range    WBC 14.1 (H) 4.8 - 10.8 K/uL    RBC 5.48 4.70 - 6.10 M/uL    Hemoglobin 16.8 14.0 - 18.0 g/dL    Hematocrit 53.0 (H) 42.0 - 52.0 %    MCV 96.7 81.4 - 97.8 fL    MCH 30.7 27.0 - 33.0 pg    MCHC 31.7 (L) 33.7 - 35.3 g/dL    RDW 49.0 35.9 - 50.0 fL    Platelet Count 413 164 - 446 K/uL    MPV 11.2 9.0 - 12.9 fL    Neutrophils-Polys 62.00 44.00 - 72.00 %    Lymphocytes 27.50 22.00 - 41.00 %    Monocytes 7.20 0.00 - 13.40 %    Eosinophils 1.90 0.00 - 6.90 %    Basophils 0.80 0.00 - 1.80 %    Immature Granulocytes 0.60 0.00 - 0.90 %    Nucleated RBC 0.00 /100 WBC    Neutrophils (Absolute) 8.76 (H) 1.82 - 7.42 K/uL    Lymphs (Absolute) 3.89 1.00 - 4.80 K/uL    Monos (Absolute) 1.01 (H) 0.00 - 0.85 K/uL    Eos (Absolute) 0.27 0.00 - 0.51 K/uL    Baso (Absolute) 0.11 0.00 - 0.12 K/uL    Immature Granulocytes (abs) 0.08 0.00 - 0.11 K/uL    NRBC (Absolute) 0.00 K/uL   Comp Metabolic Panel   Result Value Ref Range    Sodium 137 135 - 145 mmol/L    Potassium 5.2 3.6 - 5.5  mmol/L    Chloride 104 96 - 112 mmol/L    Co2 15 (L) 20 - 33 mmol/L    Anion Gap 18.0 (H) 7.0 - 16.0    Glucose 359 (H) 65 - 99 mg/dL    Bun 27 (H) 8 - 22 mg/dL    Creatinine 1.56 (H) 0.50 - 1.40 mg/dL    Calcium 9.3 8.5 - 10.5 mg/dL    AST(SGOT) 12 12 - 45 U/L    ALT(SGPT) 5 2 - 50 U/L    Alkaline Phosphatase 122 (H) 30 - 99 U/L    Total Bilirubin 0.4 0.1 - 1.5 mg/dL    Albumin 4.4 3.2 - 4.9 g/dL    Total Protein 6.9 6.0 - 8.2 g/dL    Globulin 2.5 1.9 - 3.5 g/dL    A-G Ratio 1.8 g/dL   Lactic Acid   Result Value Ref Range    Lactic Acid 3.8 (H) 0.5 - 2.0 mmol/L   Troponin   Result Value Ref Range    Troponin T 39 (H) 6 - 19 ng/L   proBrain Natriuretic Peptide, NT   Result Value Ref Range    NT-proBNP 26044 (H) 0 - 125 pg/mL   COV-2, FLU A/B, AND RSV BY PCR (2-4 HOURS [employer]): Collect NP swab in VTM    Specimen: Nasopharyngeal; Respirate   Result Value Ref Range    Influenza virus A RNA Negative Negative    Influenza virus B, PCR Negative Negative    RSV, PCR Negative Negative    SARS-CoV-2 by PCR NotDetected     SARS-CoV-2 Source NP Swab    ESTIMATED GFR   Result Value Ref Range    GFR (CKD-EPI) 44 (A) >60 mL/min/1.73 m 2   POCT arterial blood gas device results   Result Value Ref Range    Ph 7.277 (LL) 7.400 - 7.500    Pco2 28.4 26.0 - 37.0 mmHg    Po2 95 (H) 64 - 87 mmHg    Tco2 14 (L) 20 - 33 mmol/L    S02 97 93 - 99 %    Hco3 13.3 (L) 17.0 - 25.0 mmol/L    BE -12 (L) -4 - 3 mmol/L    Body Temp see below degrees    O2 Therapy 80 %    iPF Ratio 119     Specimen Arterial     DelSys NIV        COURSE & MEDICAL DECISION MAKING  Pertinent Labs & Imaging studies reviewed. (See chart for details)  Patient presents here with moderate to significant respiratory distress.  We transitioned him from EMS CPAP to BiPAP.  We ran him on BiPAP for over an hour and were able to wean him off.  His PCO2 was actually in the low normal range but he does require high flow oxygen after we weaned him off of BiPAP.  Covid and influenza  tests are negative.  He does have suggestion of heart failure for he was given Lasix.  He had significant increased work of breathing arrival but after treatment with BiPAP and Lasix he was weaned off of BiPAP and is doing much better and is appropriate for admission to the floor.  Patient had already received IV steroids as well as breathing treatments.  He will be admitted to internal medicine for further evaluation.  I reviewed the case with critical care team with Dr. Gonda who agrees to have the hospitalist team admit him to the stepdown unit    CRITICAL CARE TIME:    The patient required approximately 35 minutes worth of critical care time. This excludes any procedures. This includes time spent directly at caring for the patient, making critical medical decisions, involving consultants and speaking with the family.    FINAL IMPRESSION  1.  Acute hypoxic respiratory failure secondary to CHF  2.  COPD exacerbation         Electronically signed by: Carlos Alberto Dickerson M.D., 4/4/2022 10:22 AM

## 2022-04-04 NOTE — H&P
Hospital Medicine History & Physical Note    Date of Service  4/4/2022    Primary Care Physician  Deangelo Hayden M.D.    Consultants      Code Status  DNAR/DNI discussed with patient    Chief Complaint  Chief Complaint   Patient presents with   • Shortness of Breath       History of Presenting Illness  Laron Doherty is a 81 y.o. male who presented 4/4/2022 with dyspnea.  He has a history of dilated cardiomyopathy with an EF of 20% and severe mitral regurgitation history of COPD reports having progressive dyspnea over the past couple of weeks progressively worse overnight associated with a nonproductive cough.  He called the paramedics and was noted to be hypoxic was placed on CPAP and transferred to the emergency department.  He had transient hypotension.  He denies any chest pain.  He complains of orthopnea.  In the emergency department he received Solu-Medrol bronchodilators Lasix and was on bi-Pap for about an hour and subsequently weaned off to high flow nasal cannula.  The patient elected not to proceed with cardiac catheterization for further work-up of his ischemic cardiomyopathy.  He reports that he has been compliant with all his medications except for the Lasix and Aldactone as he was prescribed half a pill of those medications and was having difficulty cutting his pills in half.  He continues to smoke about 15 cigarettes/day.  He denies any fever or chills.    I discussed the plan of care with patient and bedside RN.    Review of Systems  Review of Systems   Constitutional: Positive for malaise/fatigue.   Respiratory: Positive for cough and shortness of breath.    Cardiovascular: Positive for orthopnea.   All other systems reviewed and are negative.      Past Medical History   has a past medical history of Benign tumor of brain (HCC).    Surgical History   has a past surgical history that includes other orthopedic surgery.     Family History    Family history reviewed with patient. There is family history  that is pertinent to the chief complaint.     Social History   reports that he has been smoking. He does not have any smokeless tobacco history on file. He reports current alcohol use. He reports that he does not use drugs.    Allergies  No Known Allergies    Medications  Prior to Admission Medications   Prescriptions Last Dose Informant Patient Reported? Taking?   albuterol 108 (90 Base) MCG/ACT Aero Soln inhalation aerosol unknown at Unknown time  No No   Sig: Inhale 2 Puffs every 6 hours as needed for Shortness of Breath for up to 360 days.   aspirin EC (ECOTRIN) 81 MG Tablet Delayed Response 4/3/2022 at Unknown time Historical Yes No   Sig: Take 81 mg by mouth every evening.   atorvastatin (LIPITOR) 40 MG Tab 4/3/2022 at Unknown time Historical Yes No   Sig: Take 40 mg by mouth every day.   carvedilol (COREG) 6.25 MG Tab 4/3/2022 at Unknown time  Yes Yes   Sig: Take 6.25 mg by mouth 2 times a day with meals.   fluticasone-salmeterol (WIXELA INHUB) 250-50 MCG/DOSE AEROSOL POWDER, BREATH ACTIVATED 4/3/2022 at Unknown time  Yes Yes   Sig: Inhale 1 Puff every 12 hours.   folic acid (FOLVITE) 1 MG Tab 4/3/2022 at Unknown time  No No   Sig: Take 1 Tablet by mouth every day for 360 days.   furosemide (LASIX) 40 MG Tab 4/3/2022 at Unknown time  No No   Sig: Take 0.5 Tablet by mouth every day for 30 days. Do not take this medication if you feel dizzy   lisinopril (PRINIVIL) 20 MG Tab 4/3/2022 at Unknown time Historical Yes No   Sig: Take 20 mg by mouth 2 times a day.   spironolactone (ALDACTONE) 25 MG Tab 4/3/2022 at Unknown time  No No   Sig: Take 0.5 Tablet by mouth every day.   tamsulosin (FLOMAX) 0.4 MG capsule 4/3/2022 at Unknown time Historical Yes No   Sig: Take 0.4 mg by mouth every evening.   thiamine (THIAMINE) 100 MG tablet 4/3/2022 at Unknown time  No No   Sig: Take 1 Tablet by mouth every day for 360 days.   tiotropium (SPIRIVA RESPIMAT) 2.5 mcg/Act Aero Soln 4/3/2022 at Unknown time  Yes Yes   Sig: Inhale 5  mcg every day.      Facility-Administered Medications: None       Physical Exam  Temp:  [36.5 °C (97.7 °F)] 36.5 °C (97.7 °F)  Pulse:  [] 70  Resp:  [17-51] 17  BP: (107-181)/() 107/55  SpO2:  [95 %-99 %] 96 %  Blood Pressure : 107/55   Temperature: 36.5 °C (97.7 °F)   Pulse: 70   Respiration: 17   Pulse Oximetry: 96 %       Physical Exam  Vitals and nursing note reviewed.   Constitutional:       Appearance: He is well-developed. He is not diaphoretic.   HENT:      Head: Normocephalic and atraumatic.      Mouth/Throat:      Pharynx: No oropharyngeal exudate.   Eyes:      General: No scleral icterus.        Right eye: No discharge.         Left eye: No discharge.      Conjunctiva/sclera: Conjunctivae normal.      Pupils: Pupils are equal, round, and reactive to light.   Neck:      Vascular: No JVD.      Trachea: No tracheal deviation.   Cardiovascular:      Rate and Rhythm: Normal rate and regular rhythm.      Heart sounds: Murmur heard.     No friction rub. No gallop.   Pulmonary:      Effort: Pulmonary effort is normal. No respiratory distress.      Breath sounds: No stridor. Decreased breath sounds and rales present. No wheezing.   Chest:      Chest wall: No tenderness.   Abdominal:      General: Bowel sounds are normal. There is no distension.      Palpations: Abdomen is soft.      Tenderness: There is no abdominal tenderness. There is no rebound.   Musculoskeletal:         General: No swelling or tenderness.      Cervical back: Neck supple.   Skin:     General: Skin is warm and dry.      Nails: There is no clubbing.   Neurological:      Mental Status: He is alert and oriented to person, place, and time.      Cranial Nerves: No cranial nerve deficit.      Motor: No abnormal muscle tone.   Psychiatric:         Behavior: Behavior normal.         Laboratory:  Recent Labs     04/04/22  0945   WBC 14.1*   RBC 5.48   HEMOGLOBIN 16.8   HEMATOCRIT 53.0*   MCV 96.7   MCH 30.7   MCHC 31.7*   RDW 49.0    PLATELETCT 413   MPV 11.2     Recent Labs     04/04/22  0945   SODIUM 137   POTASSIUM 5.2   CHLORIDE 104   CO2 15*   GLUCOSE 359*   BUN 27*   CREATININE 1.56*   CALCIUM 9.3     Recent Labs     04/04/22  0945   ALTSGPT 5   ASTSGOT 12   ALKPHOSPHAT 122*   TBILIRUBIN 0.4   GLUCOSE 359*         Recent Labs     04/04/22  0945   NTPROBNP 33027*         Recent Labs     04/04/22  0945 04/04/22  1353   TROPONINT 39* 27*       Imaging:  DX-CHEST-PORTABLE (1 VIEW)   Final Result      1.  Bilateral reticular opacities appear chronic in nature and are consistent with interstitial lung disease. Superimposed interstitial edema or pneumonia is suspected.      2.  Probable small bilateral pleural effusions.              Assessment/Plan:  I anticipate this patient will require at least two midnights for appropriate medical management, necessitating inpatient admission.    * Acute on chronic respiratory failure with hypoxia (HCC)- (present on admission)  Assessment & Plan  Likely secondary to acute cardiogenic pulmonary edema  Patient will be admitted to [facility] ED physician discussed with Dr. Gonda from critical care  We will start on IV Lasix      Type 2 diabetes mellitus with hyperglycemia, without long-term current use of insulin (HCC)  Assessment & Plan  New diagnosis with CBG greater than 300  Check hemoglobin A1c  We will start patient on a sliding scale insulin monitor CBGs    Chronic kidney disease, stage III (moderate) ([facility])  Assessment & Plan  Monitor renal function electrolytes with diuresis    Severe mitral regurgitation- (present on admission)  Assessment & Plan  Diuresis as tolerated and continue carvedilol and lisinopril for afterload reduction    Alcohol use- (present on admission)  Assessment & Plan  Counseled on cessation    Renal mass, right- (present on admission)  Assessment & Plan  Noted on previous ultrasound  Will need further work-up as outpatient    Acute on chronic systolic CHF (congestive heart failure)  ([facility])- (present on admission)  Assessment & Plan  With underlying severe mitral regurgitation     Patient will be started on IV Lasix   continue carvedilol and losartan for afterload reduction  Resume spironolactone with close monitoring of renal function electrolytes  Continue atorvastatin and aspirin  Patient previously evaluated by cardiology and referred to angiogram  Consider reconsulting cardiology depending on clinical course    Dyslipidemia- (present on admission)  Assessment & Plan  Continue atorvastatin    Lactic acidosis- (present on admission)  Assessment & Plan  Likely secondary to hypoxia  Repeat lactic acid ordered and reviewed back to normal    COPD (chronic obstructive pulmonary disease) ([facility])- (present on admission)  Assessment & Plan  No acute exacerbation  Continue Symbicort and Spiriva and bronchodilators per RT protocol  Reinforced smoking cessation      VTE prophylaxis: SCDs/TEDs Xarelto

## 2022-04-04 NOTE — ED NOTES
Report from MER Phelan.    Pt reports feeling significantly better.    Second set of blood cx collected and sent to lab.

## 2022-04-04 NOTE — CARE PLAN
Problem: Humidified High Flow Nasal Cannula  Goal: Maintain adequate oxygenation dependent on patient condition  Description: Target End Date:  resolve prior to discharge or when underlying condition is resolved/stabilized    1.  Implement humidified high flow oxygen therapy  2.  Titrate high flow oxygen to maintain appropriate SpO2  Outcome: Progressing  Flowsheets (Taken 4/4/2022 1630)  Indication: COPD diagnosis: SpO2 less than 89% despite conventional supplemental oxygen devices  Outcome: Normoxia       Respiratory Update    Pt on HFNC 30L, 50%. Will continue to titrate as tolerated.    Pt tolerating current treatments well with no adverse reactions.

## 2022-04-04 NOTE — ASSESSMENT & PLAN NOTE
Likely secondary to acute cardiogenic pulmonary edema    Continue diuresis and wean off oxygen as tolerated  Patient had oxygen at home that he used only in the evenings no portable oxygen will need new desat study on discharge

## 2022-04-04 NOTE — ED TRIAGE NOTES
Pt BIB [facility] for resp distress o2 sat = 68% on room air pt not using home 02 when EMS arrived. Pt placed on CPAP enroute o2 sats= 87% on CPAP. Pt tripod position gasping with nasal flair noted. ERP called to bedside RRT on the way. Pt being moved to BIPAP @ this time

## 2022-04-04 NOTE — ED NOTES
Report given to floor nurse pt going to [facility], nurse coming to transport

## 2022-04-04 NOTE — PROGRESS NOTES
Report received, assume care.   Lactic acid drawn and sent to lab.    Pt remains on High flow 50% O2, 100% FiO2.   Pt AOx4 able to make needs known.

## 2022-04-04 NOTE — RESPIRATORY CARE
Respiratory Therapy Update    Patient arrived on [facility] CPAP in moderate distress.  Placed on BIPAP 15/10 at this time.  Tolerating well.

## 2022-04-04 NOTE — ED NOTES
Pt to [facility]. Pt has all belongings at time of transfer. No belongings left in room after transfer. Report to MER Mckeon.

## 2022-04-05 PROBLEM — E87.20 LACTIC ACIDOSIS: Status: RESOLVED | Noted: 2022-01-23 | Resolved: 2022-04-05

## 2022-04-05 LAB
ANION GAP SERPL CALC-SCNC: 14 MMOL/L (ref 7–16)
BASOPHILS # BLD AUTO: 0.1 % (ref 0–1.8)
BASOPHILS # BLD: 0.01 K/UL (ref 0–0.12)
BUN SERPL-MCNC: 39 MG/DL (ref 8–22)
CALCIUM SERPL-MCNC: 9.3 MG/DL (ref 8.5–10.5)
CHLORIDE SERPL-SCNC: 104 MMOL/L (ref 96–112)
CO2 SERPL-SCNC: 19 MMOL/L (ref 20–33)
CREAT SERPL-MCNC: 1.64 MG/DL (ref 0.5–1.4)
EOSINOPHIL # BLD AUTO: 0 K/UL (ref 0–0.51)
EOSINOPHIL NFR BLD: 0 % (ref 0–6.9)
ERYTHROCYTE [DISTWIDTH] IN BLOOD BY AUTOMATED COUNT: 46.3 FL (ref 35.9–50)
EST. AVERAGE GLUCOSE BLD GHB EST-MCNC: 105 MG/DL
GFR SERPLBLD CREATININE-BSD FMLA CKD-EPI: 42 ML/MIN/1.73 M 2
GLUCOSE BLD STRIP.AUTO-MCNC: 100 MG/DL (ref 65–99)
GLUCOSE BLD STRIP.AUTO-MCNC: 106 MG/DL (ref 65–99)
GLUCOSE BLD STRIP.AUTO-MCNC: 111 MG/DL (ref 65–99)
GLUCOSE BLD STRIP.AUTO-MCNC: 162 MG/DL (ref 65–99)
GLUCOSE BLD STRIP.AUTO-MCNC: 99 MG/DL (ref 65–99)
GLUCOSE SERPL-MCNC: 107 MG/DL (ref 65–99)
HBA1C MFR BLD: 5.3 % (ref 4–5.6)
HCT VFR BLD AUTO: 44.5 % (ref 42–52)
HGB BLD-MCNC: 14.7 G/DL (ref 14–18)
IMM GRANULOCYTES # BLD AUTO: 0.13 K/UL (ref 0–0.11)
IMM GRANULOCYTES NFR BLD AUTO: 0.9 % (ref 0–0.9)
LYMPHOCYTES # BLD AUTO: 0.81 K/UL (ref 1–4.8)
LYMPHOCYTES NFR BLD: 5.4 % (ref 22–41)
MCH RBC QN AUTO: 30.3 PG (ref 27–33)
MCHC RBC AUTO-ENTMCNC: 33 G/DL (ref 33.7–35.3)
MCV RBC AUTO: 91.8 FL (ref 81.4–97.8)
MONOCYTES # BLD AUTO: 1.08 K/UL (ref 0–0.85)
MONOCYTES NFR BLD AUTO: 7.2 % (ref 0–13.4)
NEUTROPHILS # BLD AUTO: 12.91 K/UL (ref 1.82–7.42)
NEUTROPHILS NFR BLD: 86.4 % (ref 44–72)
NRBC # BLD AUTO: 0 K/UL
NRBC BLD-RTO: 0 /100 WBC
PLATELET # BLD AUTO: 299 K/UL (ref 164–446)
PMV BLD AUTO: 11.3 FL (ref 9–12.9)
POTASSIUM SERPL-SCNC: 4.3 MMOL/L (ref 3.6–5.5)
RBC # BLD AUTO: 4.85 M/UL (ref 4.7–6.1)
SODIUM SERPL-SCNC: 137 MMOL/L (ref 135–145)
WBC # BLD AUTO: 14.9 K/UL (ref 4.8–10.8)

## 2022-04-05 PROCEDURE — 94640 AIRWAY INHALATION TREATMENT: CPT

## 2022-04-05 PROCEDURE — 770020 HCHG ROOM/CARE - TELE (206)

## 2022-04-05 PROCEDURE — 99406 BEHAV CHNG SMOKING 3-10 MIN: CPT

## 2022-04-05 PROCEDURE — 99233 SBSQ HOSP IP/OBS HIGH 50: CPT | Performed by: HOSPITALIST

## 2022-04-05 PROCEDURE — A9270 NON-COVERED ITEM OR SERVICE: HCPCS | Performed by: HOSPITALIST

## 2022-04-05 PROCEDURE — 80048 BASIC METABOLIC PNL TOTAL CA: CPT

## 2022-04-05 PROCEDURE — 82962 GLUCOSE BLOOD TEST: CPT | Mod: 91

## 2022-04-05 PROCEDURE — 83036 HEMOGLOBIN GLYCOSYLATED A1C: CPT

## 2022-04-05 PROCEDURE — 700102 HCHG RX REV CODE 250 W/ 637 OVERRIDE(OP): Performed by: HOSPITALIST

## 2022-04-05 PROCEDURE — 700111 HCHG RX REV CODE 636 W/ 250 OVERRIDE (IP): Performed by: HOSPITALIST

## 2022-04-05 PROCEDURE — 85025 COMPLETE CBC W/AUTO DIFF WBC: CPT

## 2022-04-05 PROCEDURE — 94664 DEMO&/EVAL PT USE INHALER: CPT

## 2022-04-05 RX ADMIN — TAMSULOSIN HYDROCHLORIDE 0.4 MG: 0.4 CAPSULE ORAL at 17:15

## 2022-04-05 RX ADMIN — BUDESONIDE AND FORMOTEROL FUMARATE DIHYDRATE 2 PUFF: 160; 4.5 AEROSOL RESPIRATORY (INHALATION) at 06:55

## 2022-04-05 RX ADMIN — RIVAROXABAN 10 MG: 10 TABLET, FILM COATED ORAL at 05:16

## 2022-04-05 RX ADMIN — TIOTROPIUM BROMIDE INHALATION SPRAY 5 MCG: 3.12 SPRAY, METERED RESPIRATORY (INHALATION) at 06:55

## 2022-04-05 RX ADMIN — CARVEDILOL 6.25 MG: 6.25 TABLET, FILM COATED ORAL at 17:15

## 2022-04-05 RX ADMIN — LISINOPRIL 20 MG: 20 TABLET ORAL at 17:15

## 2022-04-05 RX ADMIN — THIAMINE HCL TAB 100 MG 100 MG: 100 TAB at 05:16

## 2022-04-05 RX ADMIN — BUDESONIDE AND FORMOTEROL FUMARATE DIHYDRATE 2 PUFF: 160; 4.5 AEROSOL RESPIRATORY (INHALATION) at 20:14

## 2022-04-05 RX ADMIN — ATORVASTATIN CALCIUM 40 MG: 40 TABLET, FILM COATED ORAL at 17:15

## 2022-04-05 RX ADMIN — FOLIC ACID 1 MG: 1 TABLET ORAL at 05:16

## 2022-04-05 RX ADMIN — ASPIRIN 81 MG: 81 TABLET, COATED ORAL at 17:15

## 2022-04-05 RX ADMIN — FUROSEMIDE 40 MG: 10 INJECTION, SOLUTION INTRAMUSCULAR; INTRAVENOUS at 05:17

## 2022-04-05 RX ADMIN — SENNOSIDES AND DOCUSATE SODIUM 2 TABLET: 50; 8.6 TABLET ORAL at 05:40

## 2022-04-05 RX ADMIN — SPIRONOLACTONE 12.5 MG: 25 TABLET, FILM COATED ORAL at 12:35

## 2022-04-05 RX ADMIN — CARVEDILOL 6.25 MG: 6.25 TABLET, FILM COATED ORAL at 06:48

## 2022-04-05 RX ADMIN — LISINOPRIL 20 MG: 20 TABLET ORAL at 05:16

## 2022-04-05 ASSESSMENT — ENCOUNTER SYMPTOMS
FEVER: 0
CHILLS: 0
SHORTNESS OF BREATH: 1
COUGH: 1

## 2022-04-05 ASSESSMENT — FIBROSIS 4 INDEX: FIB4 SCORE: 1.05

## 2022-04-05 NOTE — PROGRESS NOTES
4 Eyes Skin Assessment Completed by MER Sanchez and Sadiq RN.    Head WDL  Ears WDL  Nose WDL  Mouth WDL  Neck WDL  Breast/Chest WDL  Shoulder Blades WDL  Spine WDL  (R) Arm/Elbow/Hand WDL  (L) Arm/Elbow/Hand WDL  Abdomen WDL  Groin WDL  Scrotum/Coccyx/Buttocks Non-Blanching  (R) Leg WDL  (L) Leg WDL  (R) Heel/Foot/Toe WDL  (L) Heel/Foot/Toe WDL          Devices In Places Tele Box      Interventions In Place N/A    Possible Skin Injury Yes    Pictures Uploaded Into Epic Yes  Wound Consult Placed Yes  RN Wound Prevention Protocol Ordered No

## 2022-04-05 NOTE — CARE PLAN
Problem: Nutritional:  Goal: Achieve adequate nutritional intake  Description: Patient will consume ~50% of meals w/ >75% supp or >50% of meals consistently  Outcome: Progressing

## 2022-04-05 NOTE — PROGRESS NOTES
Hospital Medicine Daily Progress Note    Date of Service  4/5/2022    Chief Complaint  Laron Doherty is a 81 y.o. male admitted 4/4/2022 with  dyspnea    Hospital Course  Laron Doherty is a 81 y.o. male who presented 4/4/2022 with dyspnea.  He has a history of dilated cardiomyopathy with an EF of 20% and severe mitral regurgitation history of COPD reports having progressive dyspnea over the past couple of weeks progressively worse.  He was admitted to [facility] with acute CHF and started on diuresis and was requiring high flow nasal cannula      Interval Problem Update    Dyspnea is improved  Still on high flow nasal cannula but improved oxygen requirements  Denies chest pain  Tolerating diet  Procalcitonin was normal    I have personally seen and examined the patient at bedside. I discussed the plan of care with patient and bedside RN.    Consultants/Specialty       Code Status  DNAR/DNI    Disposition  Patient is not medically cleared for discharge.   Anticipate discharge to to home with close outpatient follow-up.  I have placed the appropriate orders for post-discharge needs.    Review of Systems  Review of Systems   Constitutional: Negative for chills and fever.   Respiratory: Positive for cough and shortness of breath (Improved).    Cardiovascular: Negative for leg swelling.   All other systems reviewed and are negative.       Physical Exam  Temp:  [35.7 °C (96.2 °F)-36.5 °C (97.7 °F)] 35.7 °C (96.2 °F)  Pulse:  [] 86  Resp:  [12-51] 16  BP: (105-181)/() 121/66  SpO2:  [93 %-99 %] 95 %    Physical Exam  Vitals and nursing note reviewed.   Constitutional:       Appearance: He is well-developed. He is not diaphoretic.      Comments: Thin male   HENT:      Head: Normocephalic and atraumatic.      Mouth/Throat:      Pharynx: No oropharyngeal exudate.   Eyes:      General: No scleral icterus.        Right eye: No discharge.         Left eye: No discharge.      Conjunctiva/sclera: Conjunctivae normal.       Pupils: Pupils are equal, round, and reactive to light.   Neck:      Vascular: No JVD.      Trachea: No tracheal deviation.   Cardiovascular:      Rate and Rhythm: Normal rate and regular rhythm.      Heart sounds: No murmur heard.    No friction rub. No gallop.   Pulmonary:      Effort: Pulmonary effort is normal. No respiratory distress.      Breath sounds: No stridor. Decreased breath sounds present. No wheezing.   Chest:      Chest wall: No tenderness.   Abdominal:      General: Bowel sounds are normal. There is no distension.      Palpations: Abdomen is soft.      Tenderness: There is no abdominal tenderness. There is no rebound.   Musculoskeletal:         General: No tenderness.      Cervical back: Neck supple.   Skin:     General: Skin is warm and dry.      Nails: There is no clubbing.   Neurological:      Mental Status: He is alert and oriented to person, place, and time.      Cranial Nerves: No cranial nerve deficit.      Motor: No abnormal muscle tone.   Psychiatric:         Behavior: Behavior normal.         Fluids    Intake/Output Summary (Last 24 hours) at 4/5/2022 0821  Last data filed at 4/5/2022 0700  Gross per 24 hour   Intake 120 ml   Output 2200 ml   Net -2080 ml       Laboratory  Recent Labs     04/04/22  0945 04/05/22  0324   WBC 14.1* 14.9*   RBC 5.48 4.85   HEMOGLOBIN 16.8 14.7   HEMATOCRIT 53.0* 44.5   MCV 96.7 91.8   MCH 30.7 30.3   MCHC 31.7* 33.0*   RDW 49.0 46.3   PLATELETCT 413 299   MPV 11.2 11.3     Recent Labs     04/04/22  0945 04/05/22  0324   SODIUM 137 137   POTASSIUM 5.2 4.3   CHLORIDE 104 104   CO2 15* 19*   GLUCOSE 359* 107*   BUN 27* 39*   CREATININE 1.56* 1.64*   CALCIUM 9.3 9.3                   Imaging  DX-CHEST-PORTABLE (1 VIEW)   Final Result      1.  Bilateral reticular opacities appear chronic in nature and are consistent with interstitial lung disease. Superimposed interstitial edema or pneumonia is suspected.      2.  Probable small bilateral pleural effusions.            Assessment/Plan  * Acute on chronic respiratory failure with hypoxia ([facility])- (present on admission)  Assessment & Plan  Likely secondary to acute cardiogenic pulmonary edema    Continue diuresis and wean off oxygen as tolerated  Patient had oxygen at home that he used only in the evenings no portable oxygen will need new desat study on discharge      Type 2 diabetes mellitus with hyperglycemia, without long-term current use of insulin ([facility])  Assessment & Plan  New diagnosis with CBG greater than 300 on admit  Follow-up on hemoglobin A1c    Chronic kidney disease, stage III (moderate) ([facility])  Assessment & Plan  Monitor renal function electrolytes with diuresis    Severe mitral regurgitation- (present on admission)  Assessment & Plan  Diuresis as tolerated and continue carvedilol and lisinopril for afterload reduction    Alcohol use- (present on admission)  Assessment & Plan  Counseled on cessation    Renal mass, right- (present on admission)  Assessment & Plan  Noted on previous ultrasound  Reviewed findings with patient and discussed importance of outpatient follow-up he [facility]    Acute on chronic systolic CHF (congestive heart failure) ([facility])- (present on admission)  Assessment & Plan  With underlying severe mitral regurgitation     Continue IV Lasix and monitor intake and output  Continue carvedilol losartan and Aldactone  [facility] did not get to make appointment yet  Previously evaluated by cardiology at our facility and declined coronary angiogram  Reinforce smoking cessation and lifestyle changes    Dyslipidemia- (present on admission)  Assessment & Plan  Continue atorvastatin    COPD (chronic obstructive pulmonary disease) ([facility])- (present on admission)  Assessment & Plan  No acute exacerbation  Continue Spiriva Symbicort and bronchodilators per RT protocol       VTE prophylaxis: SCDs/TEDs Xarelto    I have  performed a physical exam and reviewed and updated ROS and Plan today (4/5/2022). In review of yesterday's note (4/4/2022), there are no changes except as documented above.

## 2022-04-05 NOTE — CARE PLAN
Orientation: Alert and oriented times four.   HNT: WDL  Respiratory: Lung sounds diminished.   O2: 30L 50%  Cardiac: S1S2. Sinus on monitor.   GI: Voiding   : audible and active bowel sounds. Pt refused his senna.   IV: PIV intact and flushed.   Skin: 2 RN skin check completed. Pic in chart and wound consulted. Non blanchable spot to right buttocks.   VS: stable.   Patient safety: Hourly rounding completed. Call bell within reach and pt educated on assistance. Pt remained free from falls and injury.      The patient is Watcher - Medium risk of patient condition declining or worsening         Progress made toward(s) clinical / shift goals:  decrease oxygen needs.     Patient is not progressing towards the following goals:

## 2022-04-05 NOTE — WOUND TEAM
[facility]  Inpatient Services  Wound and Skin Care Brief Evaluation    Admission Date: 4/4/2022     Last order of IP CONSULT TO WOUND CARE was found on 4/4/2022 from Hospital Encounter on 4/4/2022     HPI, PMH, SH: Reviewed    Chief Complaint   Patient presents with   • Shortness of Breath     Diagnosis: Acute on chronic respiratory failure with hypoxia (HCC) [J96.21]    Unit where seen by Wound Team: [id number]     Wound consult placed regarding Right buttock. Chart and images reviewed. This discussed with bedside RN. This RN in to assess patient. Pt pleasant and agreeable. Pt able to stand at bedsdie sacrococcygea and buttocks area assessed. No wounds identified No pressure injuries or advanced wound care needs identified. Wound consult completed. No further follow up unless indicated and consulted.       RSKIN:   CURRENTLY IN PLACE (X), APPLIED THIS VISIT (A), ORDERED (O):   Q shift Rustam:  X  Q shift pressure point assessments:  X    Surface/Positioning   Pressure redistribution mattress            Low Airloss      X, ICU Bed    Bariatric foam      Bariatric MAHI     Waffle cushion        Waffle Overlay          Reposition q 2 hours      TAPs Turning system     Z Spencer Pillow     Offloading/Redistribution   Sacral Mepilex (Silicone dressing)   X  Heel Mepilex (Silicone dressing)  X       Heel float boots (Prevalon boot)             Float Heels off Bed with Pillows           Respiratory   Silicone O2 tubing       X  Gray Foam Ear protectors     Cannula fixation Device (Tender )          High flow offloading Clip    Elastic head band offloading device      Anchorfast                                                         Trach with Optifoam split foam             Containment/Moisture Prevention Continent    Rectal tube or BMS    Purwick/Condom Cath        Melara Catheter    Barrier wipes           Barrier paste       Antifungal tx      Interdry        Mobilization       Up to chair    X     Ambulate    X  PT/OT      Nutrition       Dietician        Diabetes Education      PO  X   TF     TPN     NPO   # days     Other

## 2022-04-05 NOTE — DISCHARGE PLANNING
Care Transition Team Discharge Planning    Anticipated Discharge Disposition: when medically stable will assess for d/c needs    Action: Lsw completed chart review, and pt admitted for shortness of breath. Pt has hx ejection fraction of 20% w/ other heart and breathing concerns. Pt admitted to [facility] w/ acute CHF, started diuresis, and went from heated high flow o2 to now on 3 LPM O2.     Pt lives in [place], and [facility] and [facility]. Pt is listed as  and his emergency contact is friend Gabrielle. She has the same last name as he does.    Barriers to Discharge: medical clearance    Plan: Lsw will continue to follow, and assist w/ d/c planning.

## 2022-04-05 NOTE — CARE PLAN
Problem: Humidified High Flow Nasal Cannula  Goal: Maintain adequate oxygenation dependent on patient condition  Description: Target End Date:  resolve prior to discharge or when underlying condition is resolved/stabilized    1.  Implement humidified high flow oxygen therapy  2.  Titrate high flow oxygen to maintain appropriate SpO2  Outcome: Progressing   30 LPM / 40%

## 2022-04-05 NOTE — CARE PLAN
The patient is Watcher - Medium risk of patient condition declining or worsening         Progress made toward(s) clinical / shift goals:  cath for urine, diet as ordered, highflow to maintain SpO2    Patient is not progressing towards the following goals:

## 2022-04-05 NOTE — RESPIRATORY CARE
COPD EDUCATION by COPD CLINICAL EDUCATOR  4/5/2022  at  10:13 AM by Aminata Johns RRT     Patient interviewed by COPD education team.  Patient unable to participate in full program.  A short intervention has been conducted.  A comprehensive packet including information about COPD, types of treatments to manage their disease and safe home Oxygen usage was provided and reviewed with patient at the bedside.  Went over action plan, medication techniques and following up with doctors.      COPD Screen  COPD Risk Screening  Do you have a history of COPD?: No  COPD Population Screener  During the past 4 weeks, how much did you feel short of breath?: Some of the time  Do you ever cough up any mucus or phlegm?: Yes, a few days a week or month  In the past 12 months, you do less than you used to because of your breathing problems: Agree  Have you smoked at least 100 cigarettes in your entire life?: Yes  How old are you?: 60+  COPD Screening Score: 7  COPD Coordinator Recommended: Yes    COPD Assessment  COPD Clinical Specialists ONLY  COPD Education Initiated: Yes--Short Intervention  DME Company: none  DME Equipment Type: none  [facility]  Pulmonologist [facility]: [facility]  Referrals Initiated: Yes  Pulmonary Rehab:  (left info at bedside, pt is interested)  Smoking Cessation: Yes  $ Smoking Cessation 3-10 Minutes: Symptomatic (3 min)  Palliative Care:  (not ordered or seen on this admit, not seen on previous admits)  Hospice: N/A  Home Health Care:  (TBD)  [facility] Outreach: N/A  [facility]: N/A  [facility] Health: [facility]  Private In-Home Care Agency: N/A  Is this a COPD exacerbation patient?: No  $ Demo/Eval of SVN's, MDI's and Aerosols: Yes (spacer given)    PFT Results    No results found for: PFT    Meds to Beds  Would the patient like to opt in for Bedside Medication Delivery at Discharge?: No     MY COPD ACTION PLAN     It is recommended that patients and physicians /healthcare  "providers complete this action plan together. This plan should be discussed at each physician visit and updated as needed.    The green, yellow and red zones show groups of symptoms of COPD. This list of symptoms is not comprehensive, and you may experience other symptoms. In the \"Actions\" column, your healthcare provider has recommended actions for you to take based on your symptoms.    Patient Name: Laron Doherty   YOB: 1940   Last Updated on: 4/5/2022 10:13 AM   Green Zone:  I am doing well today Actions   •  Usual activitiy and exercise level •  Take daily medications   •  Usual amounts of cough and phlegm/mucus •  Use oxygen as prescribed   •  Sleep well at night •  Continue regular exercise/diet plan   •  Appetite is good •  At all times avoid cigarette smoke, inhaled irritants     Daily Medications (these medications are taken every day):   Tiotropium Bromide Monohydrate (Spiriva)  Fluticasone and Salmeterol (Wixela) 1 capsule  1 Puff Once daily  Once daily     Additional Information:  Inhale one capsule through the Spiriva Handihaler once daily.    Wixela- rinse your mouth after taking.    Yellow Zone:  I am having a bad day or a COPD flare Actions   •  More breathless than usual •  Continue daily medications   •  I have less energy for my daily activities •  Use quick relief inhaler as ordered   •  Increased or thicker phlegm/mucus •  Use oxygen as prescribed   •  Using quick relief inhaler/nebulizer more often •  Get plenty of rest   •  Swelling of ankles more than usual •  Use pursed lip breathing   •  More coughing than usual •  At all times avoid cigarette smoke, inhaled irritants   •  I feel like I have a \"chest cold\"   •  Poor sleep and my symptoms woke me up   •  My appetite is not good   •  My medicine is not helping    •  Call provider immediately if symptoms don’t improve     Continue daily medications, add rescue medications:   Albuterol 2 Puffs Every 6 hours PRN       Medications " to be used during a flare up, (as Discussed with Provider):           Additional Information:  Use with the spacer.    Red Zone:  I need urgent medical care Actions   •  Severe shortness of breath even at rest •  Call 911 or seek medical care immediately   •  Not able to do any activity because of breathing    •  Fever or shaking chills    •  Feeling confused or very drowsy     •  Chest pains    •  Coughing up blood

## 2022-04-05 NOTE — DIETARY
"Nutrition services: Day 1 of admit.  Laron Doherty is a 81 y.o. male with admitting DX of acute on chronic respiratory failure with hypoxia.   Pt noted with a low BMI (15.85).  Pt appears thin with signs of muscle and fat wasting.  Pt very pleasant - RD was able to discuss appetite/intake and wt hx w/ pt.  Pt reports low appetite however this appears to be his new normal - him and his ex-wife tend to split meals instead of finishing a whole meal themselves.  Discussed it is OK to not eat 3 large meals today but rather \"graze\" on smaller frequent meals/snacks throughout the day.  Pt declined need for current diet modification.  Pt denies any GI distress or shortness of breath affecting meal times.  Encouraged pt to drink fluids between meals and focus on CHO and protein on plate first - pt agreeable.  He does drink Ensure+ 1-2x/day [facility] the [facility] and was agreeable to Boost inpatient.  UBW a year ago was fluctuating from 113 lbs - 126 lbs and many years ago weighed closer to 150 lbs many years ago.  Notes wt has been dropping related to intake, but also unsure why it continues to drop at times.  No further needs or questions - pt eating lunch.    Assessment:  Height: 182.9 cm (6')  Weight: 53 kg (116 lb 13.5 oz) - via bed scale.   Body mass index is 15.85 kg/m²., BMI classification: Underweight.   Diet/Intake: Cardiac/consistent CHO.  % thus far.     Evaluation:   1. Pt noted with CKD III, T2DM, renal mass, alcohol use, severe mitral regurgitation, acute on chronic CHF, COPD, and dyslipidemia.  Presented w/ dyspnea, additional h/o dilated cardiomyopathy, EF = 20%.    2. Pt previously reported to RD in January 2022 loss of 20-25 lbs in 8 months (14-17% for time frame), which was significant.  Additional 4.2% wt loss in 3 months, not severe nor significant however pt continues to trend down.    3. Pt noted with severe muscle loss to clavicles, deltoids, and temples in addition to severe fat loss to upper arms " and zygomatic arch.  4. Current clinical picture and MD progress notes reviewed.  No staged wounds per review of the EMR.   5. Meds: Folvite, Lasix, SSI, Prinivil, Aldactone, Thiamine.  Additional meds and labs reviewed.  6. LBM: 4/3.    Malnutrition Risk: Severe malnutrition in the context of chronic disease related malnutrition related to COPD (catabolic disease), possible EtOH use as evidenced by severe fat loss and severe muscle loss as noted above.      Recommendations/Plan:  1. Boost Glucose Control BID, Boost VHC 1x/day.  2. Encourage intake of meals and supplements.  Continue to document % consumed under ADLs.   3. Monitor weight.  4. Nutrition rep will continue to see patient for ongoing meal and snack preferences.   5. Continue vitamins.     RD follows.

## 2022-04-06 ENCOUNTER — PATIENT OUTREACH (OUTPATIENT)
Dept: HEALTH INFORMATION MANAGEMENT | Facility: OTHER | Age: 82
End: 2022-04-06
Payer: MEDICARE

## 2022-04-06 VITALS
TEMPERATURE: 97.8 F | HEIGHT: 72 IN | HEART RATE: 73 BPM | SYSTOLIC BLOOD PRESSURE: 124 MMHG | RESPIRATION RATE: 31 BRPM | BODY MASS INDEX: 15.86 KG/M2 | DIASTOLIC BLOOD PRESSURE: 68 MMHG | WEIGHT: 117.06 LBS | OXYGEN SATURATION: 96 %

## 2022-04-06 LAB
ANION GAP SERPL CALC-SCNC: 12 MMOL/L (ref 7–16)
BASOPHILS # BLD AUTO: 0.4 % (ref 0–1.8)
BASOPHILS # BLD: 0.04 K/UL (ref 0–0.12)
BUN SERPL-MCNC: 52 MG/DL (ref 8–22)
CALCIUM SERPL-MCNC: 9 MG/DL (ref 8.5–10.5)
CHLORIDE SERPL-SCNC: 105 MMOL/L (ref 96–112)
CO2 SERPL-SCNC: 22 MMOL/L (ref 20–33)
CREAT SERPL-MCNC: 1.73 MG/DL (ref 0.5–1.4)
EOSINOPHIL # BLD AUTO: 0.04 K/UL (ref 0–0.51)
EOSINOPHIL NFR BLD: 0.4 % (ref 0–6.9)
ERYTHROCYTE [DISTWIDTH] IN BLOOD BY AUTOMATED COUNT: 46.2 FL (ref 35.9–50)
GFR SERPLBLD CREATININE-BSD FMLA CKD-EPI: 39 ML/MIN/1.73 M 2
GLUCOSE BLD STRIP.AUTO-MCNC: 108 MG/DL (ref 65–99)
GLUCOSE BLD STRIP.AUTO-MCNC: 98 MG/DL (ref 65–99)
GLUCOSE SERPL-MCNC: 98 MG/DL (ref 65–99)
HCT VFR BLD AUTO: 42.5 % (ref 42–52)
HGB BLD-MCNC: 13.8 G/DL (ref 14–18)
IMM GRANULOCYTES # BLD AUTO: 0.07 K/UL (ref 0–0.11)
IMM GRANULOCYTES NFR BLD AUTO: 0.7 % (ref 0–0.9)
LYMPHOCYTES # BLD AUTO: 1.83 K/UL (ref 1–4.8)
LYMPHOCYTES NFR BLD: 17.4 % (ref 22–41)
MCH RBC QN AUTO: 30.3 PG (ref 27–33)
MCHC RBC AUTO-ENTMCNC: 32.5 G/DL (ref 33.7–35.3)
MCV RBC AUTO: 93.2 FL (ref 81.4–97.8)
MONOCYTES # BLD AUTO: 1.1 K/UL (ref 0–0.85)
MONOCYTES NFR BLD AUTO: 10.4 % (ref 0–13.4)
NEUTROPHILS # BLD AUTO: 7.46 K/UL (ref 1.82–7.42)
NEUTROPHILS NFR BLD: 70.7 % (ref 44–72)
NRBC # BLD AUTO: 0 K/UL
NRBC BLD-RTO: 0 /100 WBC
PLATELET # BLD AUTO: 270 K/UL (ref 164–446)
PMV BLD AUTO: 11.3 FL (ref 9–12.9)
POTASSIUM SERPL-SCNC: 4.3 MMOL/L (ref 3.6–5.5)
RBC # BLD AUTO: 4.56 M/UL (ref 4.7–6.1)
SODIUM SERPL-SCNC: 139 MMOL/L (ref 135–145)
WBC # BLD AUTO: 10.5 K/UL (ref 4.8–10.8)

## 2022-04-06 PROCEDURE — 700111 HCHG RX REV CODE 636 W/ 250 OVERRIDE (IP): Performed by: HOSPITALIST

## 2022-04-06 PROCEDURE — 99239 HOSP IP/OBS DSCHRG MGMT >30: CPT | Performed by: HOSPITALIST

## 2022-04-06 PROCEDURE — 80048 BASIC METABOLIC PNL TOTAL CA: CPT

## 2022-04-06 PROCEDURE — 82962 GLUCOSE BLOOD TEST: CPT | Mod: 91

## 2022-04-06 PROCEDURE — A9270 NON-COVERED ITEM OR SERVICE: HCPCS | Performed by: HOSPITALIST

## 2022-04-06 PROCEDURE — 700102 HCHG RX REV CODE 250 W/ 637 OVERRIDE(OP): Performed by: HOSPITALIST

## 2022-04-06 PROCEDURE — 85025 COMPLETE CBC W/AUTO DIFF WBC: CPT

## 2022-04-06 RX ADMIN — SPIRONOLACTONE 12.5 MG: 25 TABLET, FILM COATED ORAL at 12:19

## 2022-04-06 RX ADMIN — FOLIC ACID 1 MG: 1 TABLET ORAL at 06:18

## 2022-04-06 RX ADMIN — THIAMINE HCL TAB 100 MG 100 MG: 100 TAB at 06:18

## 2022-04-06 RX ADMIN — TIOTROPIUM BROMIDE INHALATION SPRAY 5 MCG: 3.12 SPRAY, METERED RESPIRATORY (INHALATION) at 07:51

## 2022-04-06 RX ADMIN — RIVAROXABAN 10 MG: 10 TABLET, FILM COATED ORAL at 06:18

## 2022-04-06 RX ADMIN — FUROSEMIDE 40 MG: 10 INJECTION, SOLUTION INTRAMUSCULAR; INTRAVENOUS at 06:17

## 2022-04-06 RX ADMIN — CARVEDILOL 6.25 MG: 6.25 TABLET, FILM COATED ORAL at 07:49

## 2022-04-06 RX ADMIN — BUDESONIDE AND FORMOTEROL FUMARATE DIHYDRATE 2 PUFF: 160; 4.5 AEROSOL RESPIRATORY (INHALATION) at 07:51

## 2022-04-06 RX ADMIN — LISINOPRIL 20 MG: 20 TABLET ORAL at 06:18

## 2022-04-06 RX ADMIN — SENNOSIDES AND DOCUSATE SODIUM 2 TABLET: 50; 8.6 TABLET ORAL at 06:18

## 2022-04-06 ASSESSMENT — FIBROSIS 4 INDEX: FIB4 SCORE: 1.609968943799848581

## 2022-04-06 NOTE — DISCHARGE PLANNING
Care Transition Team Discharge Planning    Anticipated Discharge Disposition: d/c home w/ o2    Action: Lsw spoke to DPA and provided completed [facility] o[facility]. She will fax to DME w/ orders, face to face, and completed [facility] DME o2 required document. DPa knows to require pt receive the portable concentrator only as pt is refusing the tanks    Taxi voucher given to RN w/ directions for when pt is ready to d/c    Barriers to Discharge: none    Plan: pt will d/c home today

## 2022-04-06 NOTE — DISCHARGE SUMMARY
Discharge Summary    CHIEF COMPLAINT ON ADMISSION  Chief Complaint   Patient presents with   • Shortness of Breath       Reason for Admission  EMS     Admission Date  4/4/2022    CODE STATUS  DNAR/DNI    HPI & HOSPITAL COURSE  This is a 81 y.o. male here with history of COPD dilated cardiomyopathy EF 20% and severe mitral regurgitation presented with severe dyspnea and noted to have acute CHF.  He was admitted to the [facility] and started on IV Lasix.  Patient was initially on BiPAP in the emergency department for about an hour and then high flow nasal cannula.  He clinically improved with diuresis and his oxygen requirements have significantly improved.  He is currently requiring 2 L at rest and 4 L with exertion.  [facility]. the patient had not been compliant with his diuretics reviewed with him today the importance to comply with prescribed diuretic therapy.  He is otherwise clinically stable for discharge with outpatient follow-up with [facility].    The patient had an renal ultrasound which revealed a right renal mass in January 2022.  I reviewed with him today again this finding and reviewed importance to obtain [facility] the [facility] with possible MRI given his chronic kidney disease.  He is also [facility] with [facility] and I recommended that he follows up with him as well after repeat imaging.  Patient voiced understanding and agreement of this recommendation and took written notes about need to follow-up with cardiology and obtain follow-up on his renal mass to discuss at his upcoming PCP visit.      Therefore, he is discharged in good and stable condition to home with close outpatient follow-up.    The patient met 2-midnight criteria for an inpatient stay at the time of discharge.    Discharge Date  4/6/2022    FOLLOW UP ITEMS POST DISCHARGE  Follow-up with PCP with repeat chemistry panel  Follow-up with cardiology and discuss  further work-up for etiology of his cardiomyopathy  Follow-up on renal mass with further imaging likely MRI and possible urology evaluation depending on results    DISCHARGE DIAGNOSES  Principal Problem:    Acute on chronic respiratory failure with hypoxia ([facility]) POA: Yes  Active Problems:    COPD (chronic obstructive pulmonary disease) ([facility]) POA: Yes    Dyslipidemia POA: Yes    Acute on chronic systolic CHF (congestive heart failure) ([facility]) POA: Yes    Renal mass, right POA: Yes    Alcohol use POA: Yes    Severe mitral regurgitation POA: Yes    Chronic kidney disease, stage III (moderate) ([facility]) POA: Unknown    Type 2 diabetes mellitus with hyperglycemia, without long-term current use of insulin ([facility]) POA: Unknown  Resolved Problems:    Lactic acidosis POA: Yes      FOLLOW UP  No future appointments.  [facility]  [address] [place] [address]  [phone]  Go on 4/13/2022  Please go as a walk in appointment on 4/13/22 at 9 am to receive immediate follow up care on your heart failure diagnosis and to be referred to a cardiologist. Thank you.      MEDICATIONS ON DISCHARGE     Medication List      CONTINUE taking these medications      Instructions   albuterol 108 (90 Base) MCG/ACT Aers inhalation aerosol   Inhale 2 Puffs every 6 hours as needed for Shortness of Breath for up to 360 days.  Dose: 2 Puff     aspirin EC 81 MG Tbec  Commonly known as: ECOTRIN   Take 81 mg by mouth every evening.  Dose: 81 mg     atorvastatin 40 MG Tabs  Commonly known as: LIPITOR   Take 40 mg by mouth every day.  Dose: 40 mg     carvedilol 6.25 MG Tabs  Commonly known as: COREG   Take 6.25 mg by mouth 2 times a day with meals.  Dose: 6.25 mg     folic acid 1 MG Tabs  Commonly known as: FOLVITE   Take 1 Tablet by mouth every day for 360 days.  Dose: 1 mg     furosemide 40 MG Tabs  Commonly known as: LASIX   Take 0.5 Tablet by mouth every day for 30 days. Do not take this medication if you  feel dizzy  Dose: 20 mg     lisinopril 20 MG Tabs  Commonly known as: PRINIVIL   Take 20 mg by mouth 2 times a day.  Dose: 20 mg     Spiriva Respimat 2.5 mcg/Act Aers  Generic drug: tiotropium   Inhale 5 mcg every day.  Dose: 5 mcg     spironolactone 25 MG Tabs  Commonly known as: ALDACTONE   Take 0.5 Tablet by mouth every day.  Dose: 12.5 mg     tamsulosin 0.4 MG capsule  Commonly known as: FLOMAX   Take 0.4 mg by mouth every evening.  Dose: 0.4 mg     thiamine 100 MG tablet  Commonly known as: THIAMINE   Take 1 Tablet by mouth every day for 360 days.  Dose: 100 mg     Wixela Inhub 250-50 MCG/DOSE Aepb  Generic drug: fluticasone-salmeterol   Inhale 1 Puff every 12 hours.  Dose: 1 Puff            Allergies  No Known Allergies    DIET  Orders Placed This Encounter   Procedures   • Diet Order Diet: Cardiac; Second Modifier: (optional): Consistent CHO (Diabetic)     Standing Status:   Standing     Number of Occurrences:   1     Order Specific Question:   Diet:     Answer:   Cardiac [6]     Order Specific Question:   Second Modifier: (optional)     Answer:   Consistent CHO (Diabetic) [4]       ACTIVITY  As tolerated.  Weight bearing as tolerated    CONSULTATIONS      PROCEDURES      LABORATORY  Lab Results   Component Value Date    SODIUM 139 04/06/2022    POTASSIUM 4.3 04/06/2022    CHLORIDE 105 04/06/2022    CO2 22 04/06/2022    GLUCOSE 98 04/06/2022    BUN 52 (H) 04/06/2022    CREATININE 1.73 (H) 04/06/2022    CREATININE 1.2 03/28/2009        Lab Results   Component Value Date    WBC 10.5 04/06/2022    HEMOGLOBIN 13.8 (L) 04/06/2022    HEMATOCRIT 42.5 04/06/2022    PLATELETCT 270 04/06/2022        Total time of the discharge process exceeds 35 minutes.

## 2022-04-06 NOTE — PROGRESS NOTES
Pt transported down to main  area via wheelchair with DME oxygen on at 3L via NC by RN. Stable VS prior to transport. PIV and mayorga removed earlier. Denies any pain nor SOB at this time

## 2022-04-06 NOTE — FACE TO FACE
"Face to Face Note  -  Durable Medical Equipment    Brayan Pfeiffer M.D. - NPI: [id number]  I certify that this patient is under my care and that they had a durable medical equipment(DME)face to face encounter by myself that meets the physician DME face-to-face encounter requirements with this patient on:    Date of encounter:   Patient:                    MRN:                       YOB: 2022  Laron Doherty  [id number]  1940     The encounter with the patient was in whole, or in part, for the following medical condition, which is the primary reason for durable medical equipment:  CHF and COPD    I certify that, based on my findings, the following durable medical equipment is medically necessary:  Oxygen and Walkers.    HOME O2 Saturation Measurements:(Values must be present for Home Oxygen orders)  Room air sat at rest: 84  Room air sat with amb: 72  With liters of O2: 2, O2 sat at rest with O2: 94  With Liters of O2: 4, O2 sat with amb with O2 : 90  Is the patient mobile?: Yes    My Clinical findings support the need for the above equipment due to:  Hypoxia    Supporting Symptoms: The patient requires supplemental oxygen, as the following interventions have been tried with limited or no improvement: \"Bronchodilators and/or steroid inhalers and \"Ambulation with oximetry    If patient feels more short of breath, they can go up to 6 liters per minute and contact healthcare provider.  "

## 2022-04-06 NOTE — DISCHARGE PLANNING
Care Transition Team Discharge Planning    Anticipated Discharge Disposition: d/c home w/ O2 and needs taxi voucher    Action: [facility]. [facility] for d/c.    Lsw spoke to pt, verified his home address and acquired information about [facility].    Lsw acquired documents to process [facility]2 via [facility]. Lsw acquired MD signature on forms, and waited for saturation measurements, order, and face to face to be entered to completed [facility] referral.    Lsw noted the needed information was completed and worked on finishing the [facility] documentation next.      pt will use taxi voucher as has no money supports or transport sources    Rn will assess for fww and assist w/ this via traction     Barriers to Discharge: receipt of portable concentrator to bedside as pt refused the tanks they attempted to provide to him last week,      Plan: pt to d/c home today w/ taxi, o2, etc.

## 2022-04-06 NOTE — DISCHARGE INSTRUCTIONS
[facility]    Follow up with PCP with repeat blood work next week  Discuss with PCP following up on Right kidney mass noted  On ultrasound done in January 2022    Discharge Instructions    Discharged to home by taxi with self. Discharged via walking, hospital escort: Yes.  Special equipment needed: Oxygen    Be sure to schedule a follow-up appointment with your primary care doctor or any specialists as instructed.     Discharge Plan:   Diet Plan: Discussed  Activity Level: Discussed  Confirmed Follow up Appointment: Patient to Call and Schedule Appointment  Confirmed Symptoms Management: Discussed  Medication Reconciliation Updated: Yes    I understand that a diet low in cholesterol, fat, and sodium is recommended for good health. Unless I have been given specific instructions below for another diet, I accept this instruction as my diet prescription.   Other diet: N/A    Special Instructions: None    · Is patient discharged on Warfarin / Coumadin?   No     Depression / Suicide Risk    As you are discharged from this [facility] facility, it is important to learn how to keep safe from harming yourself.    Recognize the warning signs:  · Abrupt changes in personality, positive or negative- including increase in energy   · Giving away possessions  · Change in eating patterns- significant weight changes-  positive or negative  · Change in sleeping patterns- unable to sleep or sleeping all the time   · Unwillingness or inability to communicate  · Depression  · Unusual sadness, discouragement and loneliness  · Talk of wanting to die  · Neglect of personal appearance   · Rebelliousness- reckless behavior  · Withdrawal from people/activities they love  · Confusion- inability to concentrate     If you or a loved one observes any of these behaviors or has concerns about self-harm, here's what you can do:  · Talk about it- your feelings and reasons for harming yourself  · Remove any means that you might  use to hurt yourself (examples: pills, rope, extension cords, firearm)  · Get professional help from the community (Mental Health, Substance Abuse, psychological counseling)  · Do not be alone:Call your Safe Contact- someone whom you trust who will be there for you.  · Call your local CRISIS HOTLINE 735-7242 or 749-112-2368  · Call your local Children's Mobile Crisis Response Team [place] (138) 018-2912 or www.Edfolio  · Call the toll free National Suicide Prevention Hotlines   · National Suicide Prevention Lifeline 389-955-JFGL (0439)  · National Hope Line Network 800-SUICIDE (907-4024)

## 2022-04-06 NOTE — DISCHARGE PLANNING
Care Transition Team Discharge Planning    Anticipated Discharge Disposition: d/c home w/ taxi voucher and o2 tank provided by [facility]    Action: [facility] @ [phone]. She indicates she is not able to provide a portable concentrator to pt if he does not qualify for it. Pt's LPM needs do not qualify him for this DME.    They will deliver o2 tank for hiim to use. They understand he uses the bus for transport and indicate [facility] can call the [facility] to get transport to [facility] appointments via volunteer service. They also see pt had a [facility] walker provided to him and this walker has a basket on it to carry O2 bottles.     Pt told RN he only had a cane at home and he gave it to his exwife d/t her new hip issues.     Lsw updated bedside RN via voalte. Lsw explained pt can get transport [facility] appointments [facility]. He will be successful w/ o2 portable tanks this way. Personal bus travel w/ 3 transferrs to his ex-wife's house on the weekends may no longer be possible if the O2 tanks do not have enough air for him to take the long trip.       Barriers to Discharge: none     Plan: pt will d/c today w/ taxi, FWW, and O2

## 2022-04-06 NOTE — PROGRESS NOTES
Oxygen Saturation at rest and ambulatory nursing evaluation:    At rest in bed Laron is on 2L O2 via NC with sats at 93-97%; when at rest and O2 is turned off his sats drop to 84-86%. We started a walk through the halls with pulse ox and a walker, and very quickly within 6-10 steps his oxygen sats dropped to 74-78% on 2L via NC, HR was 88 bpm, he did admit to SOB and dyspnea so the O2 was increased to 3L via NC, sats came up to 92% briefly. As we continued to walk at about the 50 feet denae of walking, his sats dropped to 82-84% and he again complained of SOB and mild to mod dyspnea therefore O2 was increased to 4L via NC, HR was 72-78 bpm. We completed our walk (which was the remaining 50 feet) and on 4L NC his sats were 88-91% which may indicate he could use upwards of 4L via NC with exertion/ambulation. Once back in bed sitting at rest it took about 8 minutes for his sats to get back above 90% in order to turn back down the oxygen to 2L via NC while at rest. HR during entire walk was 68-88 bpm with mild to mod SOB and dyspnea at times.

## 2022-04-06 NOTE — HEART FAILURE PROGRAM
Patient with decompensated HFrEF appears to be new with EF of 20%.  Nurses: HF has been added as a title to the education tab and to the care plan. Would you please take credit for the great work that you're doing by documenting in these? Thank you!  Providers: below are Guideline Directed Medical Therapy (GDMT) for HFrEF. If any cannot be prescribed by discharge, would you please note the clinical reason for each in your discharge summary? Thanks! Dina  • Evidence Based Beta Blocker (bisoprolol, carvedilol, or toprol xl), for EF of 40% or less  • AMANDA - I, for EF of 40% or less ARNI is preferred If not cost prohibitive for patient  • SGLT2 inhibitor with proven ASCVD, HF, or DKD benefit Jardiance/empagliflozin): If not cost prohibitive for patient  • Aldosterone antagonist, for EF of 35% or less, if applicable  • Anticoagulation for atrial arrhythmia, if applicable  • Glycemic control for DM + HF, if applicable  • Lipid lowering medication for DM + HF, if applicable  • Hydralazine Hydrochloride/Isosorbide Dinitrate. The combination of hydralazine and isosorbide- dinitrate is recommended to reduce morbidity and mortality for patients self-described  Americans with NYHA class III-IV HFrEF (EF 40% or less), receiving optimal therapy with ACE inhibitors and beta blockers, unless contraindicated (Class I, JOHANNE: A).  • Pneumococcal vaccine, if not previously received  • Influenza vaccine for current season, if not previously received  • Smoking cessation counseling documented, if applicable  • Device screening, if applicable  • Referral to disease management program specializing in heart failure care- requested that schedulers notify me if patient cannot be scheduled at the [facility]

## 2022-04-09 LAB
BACTERIA BLD CULT: NORMAL
BACTERIA BLD CULT: NORMAL
SIGNIFICANT IND 70042: NORMAL
SIGNIFICANT IND 70042: NORMAL
SITE SITE: NORMAL
SITE SITE: NORMAL
SOURCE SOURCE: NORMAL
SOURCE SOURCE: NORMAL

## 2022-09-21 ENCOUNTER — DOCUMENTATION (OUTPATIENT)
Dept: CARDIOLOGY | Facility: MEDICAL CENTER | Age: 82
End: 2022-09-21
Payer: MEDICARE

## 2022-12-31 ENCOUNTER — APPOINTMENT (OUTPATIENT)
Dept: RADIOLOGY | Facility: MEDICAL CENTER | Age: 82
DRG: 190 | End: 2022-12-31
Attending: EMERGENCY MEDICINE
Payer: COMMERCIAL

## 2022-12-31 ENCOUNTER — HOSPITAL ENCOUNTER (INPATIENT)
Facility: MEDICAL CENTER | Age: 82
LOS: 6 days | DRG: 190 | End: 2023-01-06
Attending: EMERGENCY MEDICINE | Admitting: INTERNAL MEDICINE
Payer: COMMERCIAL

## 2022-12-31 DIAGNOSIS — J96.21 ACUTE ON CHRONIC RESPIRATORY FAILURE WITH HYPOXIA AND HYPERCAPNIA (HCC): ICD-10-CM

## 2022-12-31 DIAGNOSIS — J96.22 ACUTE ON CHRONIC RESPIRATORY FAILURE WITH HYPOXIA AND HYPERCAPNIA (HCC): ICD-10-CM

## 2022-12-31 DIAGNOSIS — E43 SEVERE PROTEIN-CALORIE MALNUTRITION (HCC): ICD-10-CM

## 2022-12-31 DIAGNOSIS — N18.30 STAGE 3 CHRONIC KIDNEY DISEASE, UNSPECIFIED WHETHER STAGE 3A OR 3B CKD: ICD-10-CM

## 2022-12-31 DIAGNOSIS — J44.1 CHRONIC OBSTRUCTIVE PULMONARY DISEASE WITH ACUTE EXACERBATION (HCC): ICD-10-CM

## 2022-12-31 DIAGNOSIS — J44.9 CHRONIC OBSTRUCTIVE PULMONARY DISEASE, UNSPECIFIED COPD TYPE (HCC): ICD-10-CM

## 2022-12-31 DIAGNOSIS — N30.00 ACUTE CYSTITIS WITHOUT HEMATURIA: ICD-10-CM

## 2022-12-31 DIAGNOSIS — J44.1 ACUTE EXACERBATION OF CHRONIC OBSTRUCTIVE PULMONARY DISEASE (COPD) (HCC): ICD-10-CM

## 2022-12-31 DIAGNOSIS — R06.00 DYSPNEA, UNSPECIFIED TYPE: ICD-10-CM

## 2022-12-31 DIAGNOSIS — E87.70 HYPERVOLEMIA, UNSPECIFIED HYPERVOLEMIA TYPE: ICD-10-CM

## 2022-12-31 PROBLEM — R73.9 HYPERGLYCEMIA: Status: ACTIVE | Noted: 2022-12-31

## 2022-12-31 PROBLEM — J96.02 ACUTE RESPIRATORY FAILURE WITH HYPOXIA AND HYPERCAPNIA (HCC): Status: ACTIVE | Noted: 2022-12-31

## 2022-12-31 PROBLEM — I50.40 HEART FAILURE WITH REDUCED EJECTION FRACTION AND DIASTOLIC DYSFUNCTION (HCC): Status: ACTIVE | Noted: 2022-12-31

## 2022-12-31 PROBLEM — J96.01 ACUTE RESPIRATORY FAILURE WITH HYPOXIA AND HYPERCAPNIA (HCC): Status: ACTIVE | Noted: 2022-12-31

## 2022-12-31 PROBLEM — N39.0 URINARY TRACT INFECTION: Status: ACTIVE | Noted: 2022-12-31

## 2022-12-31 LAB
ALBUMIN SERPL BCP-MCNC: 4.5 G/DL (ref 3.2–4.9)
ALBUMIN/GLOB SERPL: 1.7 G/DL
ALP SERPL-CCNC: 138 U/L (ref 30–99)
ALT SERPL-CCNC: 19 U/L (ref 2–50)
ANION GAP SERPL CALC-SCNC: 9 MMOL/L (ref 7–16)
APPEARANCE UR: CLEAR
AST SERPL-CCNC: 22 U/L (ref 12–45)
BACTERIA #/AREA URNS HPF: ABNORMAL /HPF
BASE EXCESS BLDV CALC-SCNC: -4 MMOL/L
BASOPHILS # BLD AUTO: 0.9 % (ref 0–1.8)
BASOPHILS # BLD: 0.12 K/UL (ref 0–0.12)
BILIRUB SERPL-MCNC: 0.3 MG/DL (ref 0.1–1.5)
BILIRUB UR QL STRIP.AUTO: NEGATIVE
BODY TEMPERATURE: 37.2 CENTIGRADE
BUN SERPL-MCNC: 27 MG/DL (ref 8–22)
CALCIUM ALBUM COR SERPL-MCNC: 9 MG/DL (ref 8.5–10.5)
CALCIUM SERPL-MCNC: 9.4 MG/DL (ref 8.5–10.5)
CHLORIDE SERPL-SCNC: 105 MMOL/L (ref 96–112)
CO2 SERPL-SCNC: 27 MMOL/L (ref 20–33)
COLOR UR: YELLOW
CREAT SERPL-MCNC: 1.47 MG/DL (ref 0.5–1.4)
EKG IMPRESSION: NORMAL
EOSINOPHIL # BLD AUTO: 0.98 K/UL (ref 0–0.51)
EOSINOPHIL NFR BLD: 7.3 % (ref 0–6.9)
EPI CELLS #/AREA URNS HPF: ABNORMAL /HPF
ERYTHROCYTE [DISTWIDTH] IN BLOOD BY AUTOMATED COUNT: 47.6 FL (ref 35.9–50)
EST. AVERAGE GLUCOSE BLD GHB EST-MCNC: 117 MG/DL
FLUAV RNA SPEC QL NAA+PROBE: NEGATIVE
FLUBV RNA SPEC QL NAA+PROBE: NEGATIVE
GFR SERPLBLD CREATININE-BSD FMLA CKD-EPI: 47 ML/MIN/1.73 M 2
GLOBULIN SER CALC-MCNC: 2.7 G/DL (ref 1.9–3.5)
GLUCOSE BLD STRIP.AUTO-MCNC: 118 MG/DL (ref 65–99)
GLUCOSE BLD STRIP.AUTO-MCNC: 130 MG/DL (ref 65–99)
GLUCOSE SERPL-MCNC: 236 MG/DL (ref 65–99)
GLUCOSE UR STRIP.AUTO-MCNC: NEGATIVE MG/DL
HBA1C MFR BLD: 5.7 % (ref 4–5.6)
HCO3 BLDV-SCNC: 27 MMOL/L (ref 24–28)
HCT VFR BLD AUTO: 50.5 % (ref 42–52)
HGB BLD-MCNC: 16 G/DL (ref 14–18)
HYALINE CASTS #/AREA URNS LPF: ABNORMAL /LPF
IMM GRANULOCYTES # BLD AUTO: 0.08 K/UL (ref 0–0.11)
IMM GRANULOCYTES NFR BLD AUTO: 0.6 % (ref 0–0.9)
KETONES UR STRIP.AUTO-MCNC: NEGATIVE MG/DL
LACTATE SERPL-SCNC: 1.1 MMOL/L (ref 0.5–2)
LACTATE SERPL-SCNC: 1.1 MMOL/L (ref 0.5–2)
LACTATE SERPL-SCNC: 1.7 MMOL/L (ref 0.5–2)
LEUKOCYTE ESTERASE UR QL STRIP.AUTO: ABNORMAL
LYMPHOCYTES # BLD AUTO: 3.33 K/UL (ref 1–4.8)
LYMPHOCYTES NFR BLD: 24.8 % (ref 22–41)
MAGNESIUM SERPL-MCNC: 2.6 MG/DL (ref 1.5–2.5)
MCH RBC QN AUTO: 31 PG (ref 27–33)
MCHC RBC AUTO-ENTMCNC: 31.7 G/DL (ref 33.7–35.3)
MCV RBC AUTO: 97.9 FL (ref 81.4–97.8)
MICRO URNS: ABNORMAL
MONOCYTES # BLD AUTO: 1.18 K/UL (ref 0–0.85)
MONOCYTES NFR BLD AUTO: 8.8 % (ref 0–13.4)
NEUTROPHILS # BLD AUTO: 7.75 K/UL (ref 1.82–7.42)
NEUTROPHILS NFR BLD: 57.6 % (ref 44–72)
NITRITE UR QL STRIP.AUTO: POSITIVE
NRBC # BLD AUTO: 0 K/UL
NRBC BLD-RTO: 0 /100 WBC
NT-PROBNP SERPL IA-MCNC: 8607 PG/ML (ref 0–125)
PCO2 BLDV: 77.8 MMHG (ref 41–51)
PH BLDV: 7.16 [PH] (ref 7.31–7.45)
PH UR STRIP.AUTO: 6.5 [PH] (ref 5–8)
PHOSPHATE SERPL-MCNC: 5.9 MG/DL (ref 2.5–4.5)
PLATELET # BLD AUTO: 289 K/UL (ref 164–446)
PMV BLD AUTO: 11.6 FL (ref 9–12.9)
PO2 BLDV: 23.5 MMHG (ref 25–40)
POTASSIUM SERPL-SCNC: 5 MMOL/L (ref 3.6–5.5)
PROCALCITONIN SERPL-MCNC: 0.08 NG/ML
PROT SERPL-MCNC: 7.2 G/DL (ref 6–8.2)
PROT UR QL STRIP: 100 MG/DL
RBC # BLD AUTO: 5.16 M/UL (ref 4.7–6.1)
RBC # URNS HPF: ABNORMAL /HPF
RBC UR QL AUTO: NEGATIVE
RENAL EPI CELLS #/AREA URNS HPF: NEGATIVE /HPF
RSV RNA SPEC QL NAA+PROBE: NEGATIVE
SAO2 % BLDV: 29.3 %
SARS-COV-2 RNA RESP QL NAA+PROBE: NOTDETECTED
SODIUM SERPL-SCNC: 141 MMOL/L (ref 135–145)
SP GR UR STRIP.AUTO: 1.02
SPECIMEN SOURCE: NORMAL
TROPONIN T SERPL-MCNC: 44 NG/L (ref 6–19)
UROBILINOGEN UR STRIP.AUTO-MCNC: 1 MG/DL
WBC # BLD AUTO: 13.4 K/UL (ref 4.8–10.8)
WBC #/AREA URNS HPF: ABNORMAL /HPF

## 2022-12-31 PROCEDURE — 51702 INSERT TEMP BLADDER CATH: CPT

## 2022-12-31 PROCEDURE — 87186 SC STD MICRODIL/AGAR DIL: CPT

## 2022-12-31 PROCEDURE — 84145 PROCALCITONIN (PCT): CPT

## 2022-12-31 PROCEDURE — 94669 MECHANICAL CHEST WALL OSCILL: CPT

## 2022-12-31 PROCEDURE — 83036 HEMOGLOBIN GLYCOSYLATED A1C: CPT

## 2022-12-31 PROCEDURE — C9803 HOPD COVID-19 SPEC COLLECT: HCPCS | Performed by: EMERGENCY MEDICINE

## 2022-12-31 PROCEDURE — 94640 AIRWAY INHALATION TREATMENT: CPT

## 2022-12-31 PROCEDURE — 80053 COMPREHEN METABOLIC PANEL: CPT

## 2022-12-31 PROCEDURE — 84100 ASSAY OF PHOSPHORUS: CPT

## 2022-12-31 PROCEDURE — 81001 URINALYSIS AUTO W/SCOPE: CPT

## 2022-12-31 PROCEDURE — A9270 NON-COVERED ITEM OR SERVICE: HCPCS | Performed by: STUDENT IN AN ORGANIZED HEALTH CARE EDUCATION/TRAINING PROGRAM

## 2022-12-31 PROCEDURE — 700102 HCHG RX REV CODE 250 W/ 637 OVERRIDE(OP): Performed by: STUDENT IN AN ORGANIZED HEALTH CARE EDUCATION/TRAINING PROGRAM

## 2022-12-31 PROCEDURE — 0241U HCHG SARS-COV-2 COVID-19 NFCT DS RESP RNA 4 TRGT MIC: CPT

## 2022-12-31 PROCEDURE — 83880 ASSAY OF NATRIURETIC PEPTIDE: CPT

## 2022-12-31 PROCEDURE — 84484 ASSAY OF TROPONIN QUANT: CPT

## 2022-12-31 PROCEDURE — 82803 BLOOD GASES ANY COMBINATION: CPT

## 2022-12-31 PROCEDURE — 700111 HCHG RX REV CODE 636 W/ 250 OVERRIDE (IP): Performed by: EMERGENCY MEDICINE

## 2022-12-31 PROCEDURE — 36415 COLL VENOUS BLD VENIPUNCTURE: CPT

## 2022-12-31 PROCEDURE — 700101 HCHG RX REV CODE 250: Performed by: EMERGENCY MEDICINE

## 2022-12-31 PROCEDURE — 96374 THER/PROPH/DIAG INJ IV PUSH: CPT

## 2022-12-31 PROCEDURE — 99292 CRITICAL CARE ADDL 30 MIN: CPT | Performed by: INTERNAL MEDICINE

## 2022-12-31 PROCEDURE — 303105 HCHG CATHETER EXTRA

## 2022-12-31 PROCEDURE — 87040 BLOOD CULTURE FOR BACTERIA: CPT | Mod: 91

## 2022-12-31 PROCEDURE — 83605 ASSAY OF LACTIC ACID: CPT | Mod: 91

## 2022-12-31 PROCEDURE — 99291 CRITICAL CARE FIRST HOUR: CPT | Performed by: INTERNAL MEDICINE

## 2022-12-31 PROCEDURE — 700101 HCHG RX REV CODE 250: Performed by: STUDENT IN AN ORGANIZED HEALTH CARE EDUCATION/TRAINING PROGRAM

## 2022-12-31 PROCEDURE — 85025 COMPLETE CBC W/AUTO DIFF WBC: CPT

## 2022-12-31 PROCEDURE — 770022 HCHG ROOM/CARE - ICU (200)

## 2022-12-31 PROCEDURE — 83735 ASSAY OF MAGNESIUM: CPT

## 2022-12-31 PROCEDURE — 87077 CULTURE AEROBIC IDENTIFY: CPT

## 2022-12-31 PROCEDURE — 71045 X-RAY EXAM CHEST 1 VIEW: CPT

## 2022-12-31 PROCEDURE — 93005 ELECTROCARDIOGRAM TRACING: CPT | Performed by: EMERGENCY MEDICINE

## 2022-12-31 PROCEDURE — 87086 URINE CULTURE/COLONY COUNT: CPT

## 2022-12-31 PROCEDURE — 82962 GLUCOSE BLOOD TEST: CPT | Mod: 91

## 2022-12-31 PROCEDURE — 700111 HCHG RX REV CODE 636 W/ 250 OVERRIDE (IP): Performed by: STUDENT IN AN ORGANIZED HEALTH CARE EDUCATION/TRAINING PROGRAM

## 2022-12-31 PROCEDURE — 99291 CRITICAL CARE FIRST HOUR: CPT

## 2022-12-31 PROCEDURE — 94660 CPAP INITIATION&MGMT: CPT

## 2022-12-31 RX ORDER — BISACODYL 10 MG
10 SUPPOSITORY, RECTAL RECTAL
Status: DISCONTINUED | OUTPATIENT
Start: 2022-12-31 | End: 2023-01-04

## 2022-12-31 RX ORDER — HEPARIN SODIUM 5000 [USP'U]/ML
5000 INJECTION, SOLUTION INTRAVENOUS; SUBCUTANEOUS EVERY 8 HOURS
Status: DISCONTINUED | OUTPATIENT
Start: 2022-12-31 | End: 2023-01-06 | Stop reason: HOSPADM

## 2022-12-31 RX ORDER — LABETALOL HYDROCHLORIDE 5 MG/ML
10 INJECTION, SOLUTION INTRAVENOUS EVERY 4 HOURS PRN
Status: DISCONTINUED | OUTPATIENT
Start: 2022-12-31 | End: 2023-01-06 | Stop reason: HOSPADM

## 2022-12-31 RX ORDER — CEFTRIAXONE 1 G/1
1000 INJECTION, POWDER, FOR SOLUTION INTRAMUSCULAR; INTRAVENOUS ONCE
Status: DISCONTINUED | OUTPATIENT
Start: 2022-12-31 | End: 2022-12-31

## 2022-12-31 RX ORDER — POLYETHYLENE GLYCOL 3350 17 G/17G
1 POWDER, FOR SOLUTION ORAL
Status: DISCONTINUED | OUTPATIENT
Start: 2022-12-31 | End: 2023-01-04

## 2022-12-31 RX ORDER — BUDESONIDE AND FORMOTEROL FUMARATE DIHYDRATE 160; 4.5 UG/1; UG/1
2 AEROSOL RESPIRATORY (INHALATION)
Status: DISCONTINUED | OUTPATIENT
Start: 2022-12-31 | End: 2023-01-02

## 2022-12-31 RX ORDER — LISINOPRIL 20 MG/1
20 TABLET ORAL 2 TIMES DAILY
Status: DISCONTINUED | OUTPATIENT
Start: 2022-12-31 | End: 2023-01-06 | Stop reason: HOSPADM

## 2022-12-31 RX ORDER — POLYETHYLENE GLYCOL 3350 17 G/17G
17 POWDER, FOR SOLUTION ORAL DAILY
COMMUNITY

## 2022-12-31 RX ORDER — ATORVASTATIN CALCIUM 40 MG/1
40 TABLET, FILM COATED ORAL EVERY EVENING
Status: DISCONTINUED | OUTPATIENT
Start: 2022-12-31 | End: 2023-01-06 | Stop reason: HOSPADM

## 2022-12-31 RX ORDER — CARVEDILOL 6.25 MG/1
6.25 TABLET ORAL 2 TIMES DAILY WITH MEALS
Status: DISCONTINUED | OUTPATIENT
Start: 2022-12-31 | End: 2023-01-06 | Stop reason: HOSPADM

## 2022-12-31 RX ORDER — FOLIC ACID 1 MG/1
1 TABLET ORAL DAILY
Status: DISCONTINUED | OUTPATIENT
Start: 2023-01-01 | End: 2023-01-06 | Stop reason: HOSPADM

## 2022-12-31 RX ORDER — AMOXICILLIN 250 MG
2 CAPSULE ORAL 2 TIMES DAILY
Status: DISCONTINUED | OUTPATIENT
Start: 2022-12-31 | End: 2023-01-04

## 2022-12-31 RX ORDER — SPIRONOLACTONE 25 MG/1
12.5 TABLET ORAL DAILY
Status: DISCONTINUED | OUTPATIENT
Start: 2023-01-01 | End: 2023-01-06 | Stop reason: HOSPADM

## 2022-12-31 RX ORDER — FUROSEMIDE 20 MG/1
20 TABLET ORAL DAILY
Status: DISCONTINUED | OUTPATIENT
Start: 2023-01-01 | End: 2023-01-06 | Stop reason: HOSPADM

## 2022-12-31 RX ORDER — ACETAMINOPHEN 325 MG/1
650 TABLET ORAL EVERY 6 HOURS PRN
Status: DISCONTINUED | OUTPATIENT
Start: 2022-12-31 | End: 2023-01-06 | Stop reason: HOSPADM

## 2022-12-31 RX ORDER — VITAMIN B COMPLEX
2000 TABLET ORAL DAILY
COMMUNITY

## 2022-12-31 RX ORDER — IPRATROPIUM BROMIDE AND ALBUTEROL SULFATE 2.5; .5 MG/3ML; MG/3ML
3 SOLUTION RESPIRATORY (INHALATION)
Status: DISCONTINUED | OUTPATIENT
Start: 2022-12-31 | End: 2023-01-06 | Stop reason: HOSPADM

## 2022-12-31 RX ORDER — IPRATROPIUM BROMIDE AND ALBUTEROL SULFATE 2.5; .5 MG/3ML; MG/3ML
3 SOLUTION RESPIRATORY (INHALATION)
Status: DISCONTINUED | OUTPATIENT
Start: 2022-12-31 | End: 2022-12-31

## 2022-12-31 RX ORDER — TAMSULOSIN HYDROCHLORIDE 0.4 MG/1
0.4 CAPSULE ORAL EVERY EVENING
Status: DISCONTINUED | OUTPATIENT
Start: 2022-12-31 | End: 2023-01-06 | Stop reason: HOSPADM

## 2022-12-31 RX ORDER — METHYLPREDNISOLONE SODIUM SUCCINATE 125 MG/2ML
62.5 INJECTION, POWDER, LYOPHILIZED, FOR SOLUTION INTRAMUSCULAR; INTRAVENOUS EVERY 8 HOURS
Status: DISCONTINUED | OUTPATIENT
Start: 2022-12-31 | End: 2023-01-01

## 2022-12-31 RX ORDER — IPRATROPIUM BROMIDE AND ALBUTEROL SULFATE 2.5; .5 MG/3ML; MG/3ML
3 SOLUTION RESPIRATORY (INHALATION)
Status: DISCONTINUED | OUTPATIENT
Start: 2022-12-31 | End: 2023-01-02

## 2022-12-31 RX ORDER — NICOTINE 21 MG/24HR
21 PATCH, TRANSDERMAL 24 HOURS TRANSDERMAL
Status: DISCONTINUED | OUTPATIENT
Start: 2022-12-31 | End: 2023-01-06 | Stop reason: HOSPADM

## 2022-12-31 RX ORDER — TERBINAFINE HYDROCHLORIDE 250 MG/1
250 TABLET ORAL DAILY
COMMUNITY

## 2022-12-31 RX ORDER — GAUZE BANDAGE 2" X 2"
100 BANDAGE TOPICAL DAILY
Status: DISCONTINUED | OUTPATIENT
Start: 2022-12-31 | End: 2023-01-06 | Stop reason: HOSPADM

## 2022-12-31 RX ORDER — GUAIFENESIN 200 MG/1
400 TABLET ORAL EVERY 4 HOURS
Status: ON HOLD | COMMUNITY
End: 2023-01-06 | Stop reason: SDUPTHER

## 2022-12-31 RX ORDER — FUROSEMIDE 10 MG/ML
40 INJECTION INTRAMUSCULAR; INTRAVENOUS ONCE
Status: COMPLETED | OUTPATIENT
Start: 2022-12-31 | End: 2022-12-31

## 2022-12-31 RX ADMIN — TIOTROPIUM BROMIDE INHALATION SPRAY 5 MCG: 3.12 SPRAY, METERED RESPIRATORY (INHALATION) at 11:57

## 2022-12-31 RX ADMIN — ALBUTEROL SULFATE 10 MG/HR: 2.5 SOLUTION RESPIRATORY (INHALATION) at 06:41

## 2022-12-31 RX ADMIN — IPRATROPIUM BROMIDE AND ALBUTEROL SULFATE 3 ML: 2.5; .5 SOLUTION RESPIRATORY (INHALATION) at 15:21

## 2022-12-31 RX ADMIN — METHYLPREDNISOLONE SODIUM SUCCINATE 62.5 MG: 125 INJECTION, POWDER, FOR SOLUTION INTRAMUSCULAR; INTRAVENOUS at 10:27

## 2022-12-31 RX ADMIN — ATORVASTATIN CALCIUM 40 MG: 40 TABLET, FILM COATED ORAL at 17:22

## 2022-12-31 RX ADMIN — CEFTRIAXONE SODIUM 2000 MG: 10 INJECTION, POWDER, FOR SOLUTION INTRAVENOUS at 10:27

## 2022-12-31 RX ADMIN — IPRATROPIUM BROMIDE AND ALBUTEROL SULFATE 3 ML: 2.5; .5 SOLUTION RESPIRATORY (INHALATION) at 23:28

## 2022-12-31 RX ADMIN — CARVEDILOL 6.25 MG: 6.25 TABLET, FILM COATED ORAL at 17:23

## 2022-12-31 RX ADMIN — HEPARIN SODIUM 5000 UNITS: 5000 INJECTION, SOLUTION INTRAVENOUS; SUBCUTANEOUS at 13:27

## 2022-12-31 RX ADMIN — NICOTINE POLACRILEX 2 MG: 2 GUM, CHEWING BUCCAL at 20:46

## 2022-12-31 RX ADMIN — FUROSEMIDE 40 MG: 10 INJECTION, SOLUTION INTRAMUSCULAR; INTRAVENOUS at 08:13

## 2022-12-31 RX ADMIN — HEPARIN SODIUM 5000 UNITS: 5000 INJECTION, SOLUTION INTRAVENOUS; SUBCUTANEOUS at 21:47

## 2022-12-31 RX ADMIN — IPRATROPIUM BROMIDE AND ALBUTEROL SULFATE 3 ML: 2.5; .5 SOLUTION RESPIRATORY (INHALATION) at 11:57

## 2022-12-31 RX ADMIN — THIAMINE HCL TAB 100 MG 100 MG: 100 TAB at 10:31

## 2022-12-31 RX ADMIN — METHYLPREDNISOLONE SODIUM SUCCINATE 62.5 MG: 125 INJECTION, POWDER, FOR SOLUTION INTRAMUSCULAR; INTRAVENOUS at 21:47

## 2022-12-31 RX ADMIN — DOCUSATE SODIUM 50 MG AND SENNOSIDES 8.6 MG 2 TABLET: 8.6; 5 TABLET, FILM COATED ORAL at 18:00

## 2022-12-31 RX ADMIN — IPRATROPIUM BROMIDE AND ALBUTEROL SULFATE 3 ML: 2.5; .5 SOLUTION RESPIRATORY (INHALATION) at 19:12

## 2022-12-31 RX ADMIN — LISINOPRIL 20 MG: 20 TABLET ORAL at 10:28

## 2022-12-31 RX ADMIN — ASPIRIN 81 MG: 81 TABLET, COATED ORAL at 17:23

## 2022-12-31 RX ADMIN — LISINOPRIL 20 MG: 20 TABLET ORAL at 17:23

## 2022-12-31 RX ADMIN — BUDESONIDE AND FORMOTEROL FUMARATE DIHYDRATE 2 PUFF: 160; 4.5 AEROSOL RESPIRATORY (INHALATION) at 19:25

## 2022-12-31 RX ADMIN — TAMSULOSIN HYDROCHLORIDE 0.4 MG: 0.4 CAPSULE ORAL at 17:22

## 2022-12-31 ASSESSMENT — COGNITIVE AND FUNCTIONAL STATUS - GENERAL
DAILY ACTIVITIY SCORE: 24
MOVING TO AND FROM BED TO CHAIR: A LITTLE
SUGGESTED CMS G CODE MODIFIER DAILY ACTIVITY: CH
MOBILITY SCORE: 18
STANDING UP FROM CHAIR USING ARMS: A LITTLE
SUGGESTED CMS G CODE MODIFIER MOBILITY: CK
TURNING FROM BACK TO SIDE WHILE IN FLAT BAD: A LITTLE
CLIMB 3 TO 5 STEPS WITH RAILING: A LITTLE
WALKING IN HOSPITAL ROOM: A LITTLE
MOVING FROM LYING ON BACK TO SITTING ON SIDE OF FLAT BED: A LITTLE

## 2022-12-31 ASSESSMENT — LIFESTYLE VARIABLES
TOTAL SCORE: 0
AVERAGE NUMBER OF DAYS PER WEEK YOU HAVE A DRINK CONTAINING ALCOHOL: 4
DOES PATIENT WANT TO STOP DRINKING: NO
CONSUMPTION TOTAL: POSITIVE
ALCOHOL_USE: YES
EVER HAD A DRINK FIRST THING IN THE MORNING TO STEADY YOUR NERVES TO GET RID OF A HANGOVER: NO
ON A TYPICAL DAY WHEN YOU DRINK ALCOHOL HOW MANY DRINKS DO YOU HAVE: 1
TOTAL SCORE: 0
TOTAL SCORE: 0
EVER FELT BAD OR GUILTY ABOUT YOUR DRINKING: NO
HAVE PEOPLE ANNOYED YOU BY CRITICIZING YOUR DRINKING: NO
HOW MANY TIMES IN THE PAST YEAR HAVE YOU HAD 5 OR MORE DRINKS IN A DAY: 1
HAVE YOU EVER FELT YOU SHOULD CUT DOWN ON YOUR DRINKING: NO

## 2022-12-31 ASSESSMENT — PATIENT HEALTH QUESTIONNAIRE - PHQ9
2. FEELING DOWN, DEPRESSED, IRRITABLE, OR HOPELESS: NOT AT ALL
SUM OF ALL RESPONSES TO PHQ9 QUESTIONS 1 AND 2: 0
1. LITTLE INTEREST OR PLEASURE IN DOING THINGS: NOT AT ALL

## 2022-12-31 ASSESSMENT — PULMONARY FUNCTION TESTS
EPAP_CMH2O: 5
EPAP_CMH2O: 8

## 2022-12-31 ASSESSMENT — ENCOUNTER SYMPTOMS
DIAPHORESIS: 0
BLURRED VISION: 0
CHILLS: 1
DIZZINESS: 0
FEVER: 0
ABDOMINAL PAIN: 0
PND: 0
LOSS OF CONSCIOUSNESS: 0
BLOOD IN STOOL: 0
WHEEZING: 0
VOMITING: 0
SHORTNESS OF BREATH: 1
ORTHOPNEA: 1
HEADACHES: 0
COUGH: 0
CONSTIPATION: 0
PALPITATIONS: 0
NAUSEA: 0
BACK PAIN: 0
DIARRHEA: 0
WEAKNESS: 0
HEMOPTYSIS: 0

## 2022-12-31 ASSESSMENT — FIBROSIS 4 INDEX
FIB4 SCORE: 1.43
FIB4 SCORE: 1.43
FIB4 SCORE: 1.63

## 2022-12-31 ASSESSMENT — PAIN DESCRIPTION - PAIN TYPE
TYPE: ACUTE PAIN

## 2022-12-31 ASSESSMENT — COPD QUESTIONNAIRES
DURING THE PAST 4 WEEKS HOW MUCH DID YOU FEEL SHORT OF BREATH: SOME OF THE TIME
COPD SCREENING SCORE: 6
HAVE YOU SMOKED AT LEAST 100 CIGARETTES IN YOUR ENTIRE LIFE: YES
DO YOU EVER COUGH UP ANY MUCUS OR PHLEGM?: YES, A FEW DAYS A WEEK OR MONTH

## 2022-12-31 NOTE — RESPIRATORY CARE
COPD EDUCATION by COPD CLINICAL EDUCATOR  12/31/2022 at 3:21 PM by Yahaira Buckley, RRT     Patient not available for education- our team will revisit

## 2022-12-31 NOTE — ED NOTES
Med rec complete per patient at bedside and per [facility] pharmacy.    Patient has NKDA.    Patient has no abx in the last 30 days.    Home pharmacy is [facility].

## 2022-12-31 NOTE — ED NOTES
Per pt, he straight caths four times a day at home to void. Dr. Ledesma updated.  Ordered received for mayorga placement.  Mayorga placed with aseptic technique. Pt tolerated well.  UA collected and sent to lab. Pt medicated per MAR.

## 2022-12-31 NOTE — ASSESSMENT & PLAN NOTE
Elevated on admission  A1c 5.3% in 04/2022  [facility]  Hypoglycemic protocol  Check A1c  Anticipate elevations while on steroids as well

## 2022-12-31 NOTE — ED TRIAGE NOTES
Chief Complaint   Patient presents with    Shortness of Breath     Acute onset SOB at midnight. Pt was 68% on usual home 4L-->placed on CPAP by EMS.      Pt BIB EMS from home for above complaint. Received albuterol, duoneb X2 and solumedrol PTA. Placed on BIPAP in ER

## 2022-12-31 NOTE — ED PROVIDER NOTES
ED Provider Note        CHIEF COMPLAINT  Chief Complaint   Patient presents with    Shortness of Breath     Acute onset SOB at midnight. Pt was 68% on usual home 4L-->placed on CPAP by EMS.            HPI  Laron Doherty is a 82 y.o. male who presents for evaluation of shortness of breath.  Initial history obtained by EMS as patient's respiratory distress makes it difficult to obtain history from him, he is only able to answer yes or no questions on arrival.  Per EMS patient is on 4 L of oxygen at baseline, has a history of COPD.  He was reportedly out of his oxygen and was placed on CPAP by EMS as his initial oxygen saturation was 60% on his baseline 4 L when EMS placed him on this.  He was given 1 albuterol, 2 DuoNeb and 125 mg of Solu-Medrol in route to the hospital along with his CPAP.  Work of breathing is improving with this.  Further history limited by patient's respiratory distress.    LIMITATION TO HISTORY   Respiratory distress    OUTSIDE HISTORIAN(S):  EMS and record review    EXTERNAL RECORDS REVIEWED  [facility] review [facility]  [facility] record reviews demonstrates that he was hospitalized last in April 2022.  He was hospitalized for COPD and CHF exacerbation.  He has known CHF with ejection fraction of 20%.        REVIEW OF SYSTEMS  Review of Systems   Respiratory:  Positive for shortness of breath.    Review of systems limited by respiratory distress  See HPI for further details. All other systems are negative.     PAST MEDICAL HISTORY   has a past medical history of Benign tumor of brain (HCC).,  COPD, CHF    SURGICAL HISTORY   has a past surgical history that includes other orthopedic surgery.    FAMILY HISTORY  None pertinent    SOCIAL HISTORY  Social History     Tobacco Use    Smoking status: Every Day     Packs/day: 1.00     Types: Cigarettes    Smokeless tobacco: Never   Substance and Sexual Activity    Alcohol use: Yes     Alcohol/week: 3.6 oz     Types: 6 Shots of  liquor per week    Drug use: No    Sexual activity: Not on file       CURRENT MEDICATIONS  Home Medications    **Home medications have not yet been reviewed for this encounter**         ALLERGIES  No Known Allergies    PHYSICAL EXAM  VITAL SIGNS: BP (!) 168/88   Pulse 84   Temp 36.2 °C (97.2 °F) (Temporal)   Resp 19   Ht 1.829 m (6')   Wt 50.8 kg (112 lb)   SpO2 99%   BMI 15.19 kg/m²    Nursing note and vitals reviewed.  Constitutional: Well-developed and well-nourished.  Positive for distress  HENT: Head is normocephalic and atraumatic.  Eyes: extra-ocular movements intact  Cardiovascular: regular rate and  regular rhythm. No murmur heard.  Pulmonary/Chest: Diminished air movement throughout all lung fields, patient is only able to speak in 1-2 word sentences, faint expiratory wheezes noted in bibasilar bases  Abdominal: Soft and non-tender. No distention.    Musculoskeletal: Extremities exhibit normal range of motion without edema or tenderness.   Neurological: Awake and alert  Skin: Skin is warm and dry. No rash.         DIAGNOSTIC STUDIES / PROCEDURES  EKG  EKG at 6:35 AM: Sinus tachycardia, heart rate 110, normal axis, intervals notable for prolonged QRS of 128 with associated left bundle branch block, , QTc 496, left ventricular hypertrophy is present, no acute ST-T segment changes, negative Sgarrbossa criteria, no evidence of acute arrhythmia or ischemia per my interpretation    LABS  Labs Reviewed   CBC WITH DIFFERENTIAL - Abnormal; Notable for the following components:       Result Value    WBC 13.4 (*)     MCV 97.9 (*)     MCHC 31.7 (*)     Eosinophils 7.30 (*)     Neutrophils (Absolute) 7.75 (*)     Monos (Absolute) 1.18 (*)     Eos (Absolute) 0.98 (*)     All other components within normal limits    Narrative:     Biotin intake of greater than 5 mg per day may interfere with  troponin levels, causing false low values.   COMP METABOLIC PANEL - Abnormal; Notable for the following components:     Glucose 236 (*)     Bun 27 (*)     Creatinine 1.47 (*)     Alkaline Phosphatase 138 (*)     All other components within normal limits    Narrative:     Biotin intake of greater than 5 mg per day may interfere with  troponin levels, causing false low values.   URINALYSIS - Abnormal; Notable for the following components:    Protein 100 (*)     Nitrite Positive (*)     Leukocyte Esterase Small (*)     All other components within normal limits    Narrative:     Indication for culture:->Emergency Room Patient   TROPONIN - Abnormal; Notable for the following components:    Troponin T 44 (*)     All other components within normal limits    Narrative:     Biotin intake of greater than 5 mg per day may interfere with  troponin levels, causing false low values.   VENOUS BLOOD GAS - Abnormal; Notable for the following components:    Venous Bg Ph 7.16 (*)     Venous Bg Pco2 77.8 (*)     Venous Bg Po2 23.5 (*)     All other components within normal limits    Narrative:     Indication for culture:->Emergency Room Patient   PROBRAIN NATRIURETIC PEPTIDE, NT - Abnormal; Notable for the following components:    NT-proBNP 8607 (*)     All other components within normal limits    Narrative:     Biotin intake of greater than 5 mg per day may interfere with  troponin levels, causing false low values.   ESTIMATED GFR - Abnormal; Notable for the following components:    GFR (CKD-EPI) 47 (*)     All other components within normal limits    Narrative:     Biotin intake of greater than 5 mg per day may interfere with  troponin levels, causing false low values.   URINE MICROSCOPIC (W/UA) - Abnormal; Notable for the following components:    WBC 10-20 (*)     RBC 0-2 (*)     Bacteria Many (*)     All other components within normal limits    Narrative:     Indication for culture:->Emergency Room Patient   LACTIC ACID    Narrative:     Biotin intake of greater than 5 mg per day may interfere with  troponin levels, causing false low values.  "  LACTIC ACID   COV-2, FLU A/B, AND RSV BY PCR (CEPHEID)   PROCALCITONIN    Narrative:     Biotin intake of greater than 5 mg per day may interfere with  troponin levels, causing false low values.   CORRECTED CALCIUM    Narrative:     Biotin intake of greater than 5 mg per day may interfere with  troponin levels, causing false low values.   LACTIC ACID   URINE CULTURE(NEW)    Narrative:     Indication for culture:->Emergency Room Patient   BLOOD CULTURE    Narrative:     Per Hospital Policy: Only change Specimen Src: to \"Line\" if  specified by physician order.   BLOOD CULTURE    Narrative:     Per Hospital Policy: Only change Specimen Src: to \"Line\" if  specified by physician order.   COV-2, FLU A/B, AND RSV BY PCR (CEPHEID)         RADIOLOGY  DX-CHEST-PORTABLE (1 VIEW)   Final Result      Again seen diffuse bilateral interstitial infiltrates which may represent chronic interstitial lung disease versus interstitial edema or atypical infection.            COURSE & MEDICAL DECISION MAKING  Pertinent Labs & Imaging studies reviewed. (See chart for details)    ED Observation Status? No; Patient does not meet criteria for ED Observation.     INITIAL ASSESSMENT AND PLAN    Escalation of care considered, and ultimately not performed: see below.     Barriers to care at this time, including but not limited to: none.     Diagnostic tests and prescription drugs considered including, but not limited to: see below.     I considered fluid resuscitation in this patient with moderate distress as he may have underlying infection however given his history of heart failure I did not want to fluid overload him and therefore did not give IV fluids.     FINAL PROBLEM LIST AND PLAN  In addition to the chief complaint, the following problems were addressed: see below    I have discussed management of the patient with the following physicians and MACY's:  Dr. Schofield, intensivist    Discussion of management with other [facility] or " appropriate source(s): Dr. Schofield, intensivist      DISCUSSION  Patient is an 82-year-old male who comes in for evaluation of shortness of breath.  On arrival patient is in respiratory distress, only able to speak in 1-2 word sentences.  He is switched over to our CPAP machine on continuous albuterol at 10 mg/h.  Differential diagnosis includes COPD exacerbation, CHF exacerbation, infection, sepsis.  Sepsis protocol is ordered.  He has not empirically started on antibiotics as currently symptoms seem more consistent with COPD exacerbation.    After approximately 30 minutes on CPAP patient is feeling greatly improved and able to speak in full sentences.  He states that he has been having cough and worsening shortness of breath for the past 6 days.  He denies fevers or chills.  He is feeling greatly improved on CPAP.  Initial VBG consistent with likely COPD exacerbation, patient is acidotic with elevated PCO2.  Chest x-ray also demonstrates diffuse infiltrates and BNP is elevated, therefore patient may have a component of CHF exacerbation as well and is diuresed with Lasix.  Troponin is slightly elevated at 44, he is not having any chest pain and EKG is nonischemic, this is likely secondary to his acute distress and slight fluid overload.  Labs are otherwise notable for mildly elevated creatinine of 1.47 again not treated with IV fluids at this time as I am diuresing him.  Upon multiple reassessments patient is clinically doing better.  Patient does straight cath 4 times a day and therefore Melara catheter is placed as he will require close fluid monitoring.  Urinalysis is consistent with infection therefore patient is treated with ceftriaxone.  At this point he will be hospitalized to the ICU given that he is requiring CPAP for management of his hypoxic and hypercapnic respiratory failure.  I discussed the case with Dr. Greene who has accepted patient for hospitalization.  Patient is in critical  condition.      CRITICAL CARE  The very real possibilty of a deterioration of this patient's condition required the highest level of my preparedness for sudden, emergent intervention.  I provided critical care services, which included medication orders, frequent reevaluations of the patient's condition and response to treatment, ordering and reviewing test results, and discussing the case with various consultants.  The critical care time associated with the care of the patient was 45 minutes. Review chart for interventions. This time is exclusive of any other billable procedures.        FINAL IMPRESSION  1. Acute on chronic respiratory failure with hypoxia and hypercapnia (HCC)    2. Hypervolemia, unspecified hypervolemia type    3. Dyspnea, unspecified type           Electronically signed by: Marita Ledesma M.D., 12/31/2022 6:33 AM

## 2022-12-31 NOTE — ASSESSMENT & PLAN NOTE
EF 20% with severe MR and grade II diastolic dysfunction on last TTE in 01/2022  CXR doesn't appear to be acutely volume overloaded and clinically does endorse orthopnea but physical exam appears dry  S/p IV Lasix in ED  Do not feel this to be an acute exacerbation of chronic systolic heart failure  Continue home Carvedilol, Lisinopril, Spironolactone  Continue home oral Lasix  Strict I/O's  Cardiac diet

## 2022-12-31 NOTE — CARE PLAN
Problem: Bronchoconstriction  Goal: Improve in air movement and diminished wheezing  Description: Target End Date:  2 to 3 days    1.  Implement inhaled treatments  2.  Evaluate and manage medication effects  Flowsheets (Taken 12/31/2022 1205)  Bronchodilator Goals/Outcome: Diminished Wheezing and Volume of Air Movement Increased  Bronchodilator Indications: History / Diagnosis  Note:     Respiratory Update    Treatment modality: SVN/MDI/Flutter  Frequency:Q4/BID/QID    Tolerating being off BiPAP on NC.    Pt tolerating current treatments well with no adverse reactions.

## 2022-12-31 NOTE — H&P
CRITICAL CARE MEDICINE ATTENDING HISTORY AND PHYSICAL    Date of admission  12/31/2022    Chief Complaint  82 y.o. male admitted 12/31/2022 with acute on chronic respiratory failure and acute exacerbation of COPD.    Hospital Course      12/31 -    admitted to ICU.      Interval Problem Update  Reviewed last 24 hour events:      This is a delightful 82-year-old gentleman with a history of chronic hypoxemic respiratory failure on 4 L of domiciliary oxygen, COPD, chronic combined systolic and diastolic heart failure with an LVEF of 20% and grade 2 diastolic dysfunction, severe mitral regurgitation, diabetes mellitus type 2, stage III CKD and dyslipidemia.  He presents to the ED via EMS complaining of increasing dyspnea.  He arrived in significant respiratory distress was only able to speak in one-word answers (which clearly not even a phrase, let alone a sentence.)  He was placed on BiPAP for respiratory distress and has improved.  He has been experiencing orthopnea.  He had some chills last night, but no fever or sweats.  He tells me [facility] with chest congestion and increasing shortness of breath.  Since [facility], his cough has improved.  It is now productive of very little sputum.  Denies any hemoptysis.  He denies any angina, palpitations, syncope or edema.  He self caths 4 times daily.      Review of Systems  Review of Systems   Unable to perform ROS: Acuity of condition     Vital Signs for the last 24 hours  Temp:  [36.2 °C (97.2 °F)-37.2 °C (99 °F)] 37.2 °C (99 °F)  Pulse:  [] 92  Resp:  [16-39] 39  BP: (139-183)/() 139/80  SpO2:  [98 %-100 %] 98 %    Hemodynamic parameters for the last 24 hours       Vent Settings for the last 24 hours       Physical Exam  Physical Exam  Constitutional:       Appearance: He is not diaphoretic.   HENT:      Head: Normocephalic.      Mouth/Throat:      Pharynx: Oropharynx is clear.    Eyes:      Pupils: Pupils are equal, round, and reactive to light.   Cardiovascular:      Comments: Sinus rhythm  Pulmonary:      Breath sounds: Wheezing (Few wheezes) and rales (Few coarse crackles) present.      Comments: Tachypneic  Abdominal:      General: There is no distension.      Tenderness: There is no abdominal tenderness.   Musculoskeletal:      Right lower leg: No edema.      Left lower leg: No edema.   Skin:     General: Skin is warm.   Neurological:      General: No focal deficit present.      Mental Status: He is oriented to person, place, and time.      Cranial Nerves: No cranial nerve deficit.       Medications  Current Facility-Administered Medications   Medication Dose Route Frequency Provider Last Rate Last Admin    senna-docusate (PERICOLACE or SENOKOT S) 8.6-50 MG per tablet 2 Tablet  2 Tablet Oral BID Ken Mccollum M.D.        And    polyethylene glycol/lytes (MIRALAX) PACKET 1 Packet  1 Packet Oral QDAY PRN Ken Mccollum M.D.        And    magnesium hydroxide (MILK OF MAGNESIA) suspension 30 mL  30 mL Oral QDAY PRN Ken Mccollum M.D.        And    bisacodyl (DULCOLAX) suppository 10 mg  10 mg Rectal QDAY PRN Ken Mccollum M.D.        Respiratory Therapy Consult   Nebulization Continuous RT Ken Mccollum M.D.        heparin injection 5,000 Units  5,000 Units Subcutaneous Q8HRS Ken Mccollum M.D.        acetaminophen (Tylenol) tablet 650 mg  650 mg Oral Q6HRS PRN Ken Mccollum M.D.        labetalol (NORMODYNE/TRANDATE) injection 10 mg  10 mg Intravenous Q4HRS PRN Ken Mccollum M.D.        nicotine (NICODERM) 21 MG/24HR 21 mg  21 mg Transdermal Daily-0600 Ken Mccollum M.D.        And    nicotine polacrilex (NICORETTE) 2 MG piece 2 mg  2 mg Oral Q HOUR PRN Ken Mccollum M.D.        insulin regular (HumuLIN R,NovoLIN R) injection  1-6 Units Subcutaneous 4X/DAY JENNIFER Mccollum M.D.        And    dextrose 10 % BOLUS 25 g  25 g  Intravenous Q15 MIN PRN Ken Mccollum M.D.        methylPREDNISolone sod succ (SOLU-MEDROL) 125 MG injection 62.5 mg  62.5 mg Intravenous Q8HRS Ken Mccollum M.D.   62.5 mg at 12/31/22 1027    cefTRIAXone (Rocephin) syringe 2,000 mg  2,000 mg Intravenous Q24HRS Ken Mccollum M.D.   2,000 mg at 12/31/22 1027    ipratropium-albuterol (DUONEB) nebulizer solution  3 mL Nebulization Q4HRS (RT) Ken Mccollum M.D.        aspirin EC (ECOTRIN) tablet 81 mg  81 mg Oral Q EVENING Ken Mccollum M.D.        atorvastatin (LIPITOR) tablet 40 mg  40 mg Oral Q EVENING Ken Mccollum M.D.        carvedilol (COREG) tablet 6.25 mg  6.25 mg Oral BID WITH MEALS Ken Mccollum M.D.        [START ON 1/1/2023] folic acid (FOLVITE) tablet 1 mg  1 mg Oral DAILY Ken Mccollum M.D.        [START ON 1/1/2023] furosemide (LASIX) tablet 20 mg  20 mg Oral DAILY Ken Mccollum M.D.        lisinopril (PRINIVIL) tablet 20 mg  20 mg Oral BID Ken Mccollum M.D.   20 mg at 12/31/22 1028    [START ON 1/1/2023] spironolactone (ALDACTONE) tablet 12.5 mg  12.5 mg Oral DAILY Ken Mccollum M.D.        tamsulosin (FLOMAX) capsule 0.4 mg  0.4 mg Oral Q EVENING Ken Mccollum M.D.        thiamine (Vitamin B-1) tablet 100 mg  100 mg Oral DAILY Ken Mccollum M.D.   100 mg at 12/31/22 1031    tiotropium (Spiriva Respimat) 2.5 mcg/Act inhalation spray 5 mcg  5 mcg Inhalation QDAILY (RT) Ken Mccollum M.D.        ipratropium-albuterol (DUONEB) nebulizer solution  3 mL Nebulization Q2HRS PRN (RT) Ken Mccollum M.D.        budesonide-formoterol (SYMBICORT) 160-4.5 MCG/ACT inhaler 2 Puff  2 Puff Inhalation BID (RT) Ken Mccollum M.D.           Fluids  No intake or output data in the 24 hours ending 12/31/22 1054    Laboratory      Recent Labs     12/31/22  0636   SODIUM 141   POTASSIUM 5.0   CHLORIDE 105   CO2 27   BUN 27*   CREATININE 1.47*   MAGNESIUM 2.6*   PHOSPHORUS 5.9*    CALCIUM 9.4     Recent Labs     12/31/22  0636   ALTSGPT 19   ASTSGOT 22   ALKPHOSPHAT 138*   TBILIRUBIN 0.3   GLUCOSE 236*     Recent Labs     12/31/22  0636   WBC 13.4*   NEUTSPOLYS 57.60   LYMPHOCYTES 24.80   MONOCYTES 8.80   EOSINOPHILS 7.30*   BASOPHILS 0.90   ASTSGOT 22   ALTSGPT 19   ALKPHOSPHAT 138*   TBILIRUBIN 0.3     Recent Labs     12/31/22  0636   RBC 5.16   HEMOGLOBIN 16.0   HEMATOCRIT 50.5   PLATELETCT 289       Imaging  X-Ray:  I have personally reviewed the images and compared with prior images. and My impression is: Chronic interstitial changes with hyperexpanded lungs    Assessment/Plan      Acute on chronic hypercarbic and hypoxemic respiratory failure   I am titrating BiPAP for respiratory distress   On 4 L of domiciliary oxygen    Acute exacerbation of COPD   Testing for influenza, RSV and SARS-CoV-2 is negative   Methylprednisolone, 62.5 mg IV every 8 hours   Bronchodilators    Chronic combined systolic and diastolic failure   LVEF 20%   Grade 2 diastolic dysfunction   Continue carvedilol, 6.25 mg twice daily   Continue lisinopril, 20 mg daily   Continue spironolactone, 12.5 mg daily   Continue furosemide    Severe mitral regurgitation    Pyuria   He self caths 4 times a day   Begin ceftriaxone    Diabetes mellitus type 2   Glycohemoglobin 5.7   Sliding scale insulin    Stage III CKD   Monitor renal function and urine output   Avoid nephrotoxins and renal dose medications    Dyslipidemia   Atorvastatin    Tobacco abuse   He continues to smoke 1 package of cigarettes a day   Cessation counseling    Daily alcohol use   He refers to drink 2 fingers of [place] [place] bourbon with a splash of water each evening, but is now drinking 2 fingers of Cristobal Walker blended scotch with water instead due to 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 In review of yesterday's note (12/30/2022), there are no changes except as documented above.     I have assessed and reassessed his respiratory status with the titration of BiPAP for respiratory distress, his blood pressure, hemodynamics and cardiovascular status.  He is at increased risk for worsening respiratory system dysfunction.    Discussed patient condition and risk of morbidity and/or mortality with RN and RT    The patient remains critically ill.  Critical care time = 105 minutes in directly providing and coordinating critical care and extensive data review.  No time overlap and excludes procedures.    A Critical Care Medicine progress note may have been authored by a resident physician or advanced practitioner of nursing under my direct supervision on this date of service.  As the supervising and attending physician, I have either attested to or cosigned that document.  IN THE EVENT THAT DISCREPANCIES EXIST BETWEEN THIS DOCUMENT AND ANY DOCUMENT THAT I HAVE ATTESTED TO OR COSIGNED ON THIS DATE OF SERVICE, THEN THIS DOCUMENT REMAINS THE FINAL AUTHORITY AS TO MY ASSESSMENT AND PLAN REGARDING THE CARE OF THIS PATIENT.    Julian Schofield MD  Pulmonary and Critical Care Medicine

## 2022-12-31 NOTE — CONSULTS
Critical Care/Pulmonary Consultation    Date of Service: 12/31/2022    Date of Admission:  12/31/2022  6:29 AM    Consulting Physician: Julian Schofield    Chief Complaint:  Shortness of Breath (Acute onset SOB at midnight. Pt was 68% on usual home 4L-->placed on CPAP by EMS. )      History of Present Illness: Laron Doherty is a 82 y.o. male with a past medical history of chronic obstructive pulmonary disease on triple therapy complicated by chronic respiratory failure with hypoxia on 4L O2 at baseline, heart failure with reduced ejection fraction with EF 20% and severe mitral regurgitation with grade II diastolic dysfunction on GDMT, diabetes mellitus type II, CKD stage III, dyslipidemia. He presented to the hospital with sudden increase in dyspnea overnight and appeared in significant respiratory distress, only able to speak in 1 word responses. He received multiple breathing treatments en route with EMS and then placed on BiPAP in the ED and his respiratory distress was significantly improved by time of my evaluation. He denies having any other associated symptoms recently other than chills and orthopnea. Denies any recent known sick contacts. Other notable history states [facility] with increasing dyspnea, cough and phlegm with had since resolved post discharge until this episode. Denies fevers, nausea/vomiting, chest pain, abdominal pain, diarrhea. He does state he chronically self catheterizes himself 4 times a day.     In the ED patient did receive additional breathing treatments and dose of Lasix 40mg IV x 1.    Tobacco: Currently 1ppd, minimum 60 pack-year history  Alcohol: 2 fingers of whiskey most nights, denies history of withdrawals  Drugs: Denies any IV or other recreational drug use  Living: Lives alone    Review of Systems   Constitutional:  Positive for chills. Negative for diaphoresis and fever.   Eyes:  Negative for blurred vision.    Respiratory:  Positive for shortness of breath. Negative for cough, hemoptysis and wheezing.    Cardiovascular:  Positive for orthopnea. Negative for chest pain, palpitations, leg swelling and PND.   Gastrointestinal:  Negative for abdominal pain, blood in stool, constipation, diarrhea, melena, nausea and vomiting.   Genitourinary:  Negative for dysuria and hematuria.   Musculoskeletal:  Negative for back pain.   Neurological:  Negative for dizziness, loss of consciousness, weakness and headaches.     Home Medications    **Home medications have not yet been reviewed for this encounter**         Social History     Tobacco Use    Smoking status: Every Day     Packs/day: 1.00     Types: Cigarettes    Smokeless tobacco: Never   Substance Use Topics    Alcohol use: Yes     Alcohol/week: 3.6 oz     Types: 6 Shots of liquor per week    Drug use: No        Past Medical History:   Diagnosis Date    Benign tumor of brain (HCC)        Past Surgical History:   Procedure Laterality Date    OTHER ORTHOPEDIC SURGERY         Allergies: Patient has no known allergies.    History reviewed. No pertinent family history.    Vitals:    12/31/22 0637 12/31/22 0639 12/31/22 0640 12/31/22 0700   Height: 1.829 m (6')      Weight: 50.8 kg (112 lb)      Weight % change since last entry.: 0 %      BP:  (!) 183/129  (!) 173/131   Pulse:  (!) 110  (!) 111   BMI (Calculated): 15.19      Resp:  (!) 26  18   Temp:   36.2 °C (97.2 °F)    TempSrc:   Temporal     12/31/22 0814 12/31/22 0902 12/31/22 0903   Height:      Weight:      Weight % change since last entry.:      BP: (!) 150/88 (!) 168/88    Pulse: 81 84    BMI (Calculated):      Resp: 16  19   Temp:      TempSrc:          Physical Examination  Physical Exam  Vitals and nursing note reviewed.   Constitutional:       Appearance: He is ill-appearing. He is not diaphoretic.      Comments: Thin frail gentleman. Lying in the bed with the BiPAP in place   HENT:      Head: Atraumatic.      Comments:  Bitemporal wasting noted     Mouth/Throat:      Mouth: Mucous membranes are dry.   Eyes:      Conjunctiva/sclera: Conjunctivae normal.      Pupils: Pupils are equal, round, and reactive to light.   Cardiovascular:      Rate and Rhythm: Normal rate and regular rhythm.      Pulses: Normal pulses.   Pulmonary:      Effort: Pulmonary effort is normal.      Breath sounds: No wheezing, rhonchi or rales.      Comments: BiPAP in place.  Abdominal:      General: Abdomen is flat. There is no distension.      Palpations: Abdomen is soft.      Tenderness: There is no abdominal tenderness. There is no guarding.   Musculoskeletal:      Right lower leg: No edema.      Left lower leg: No edema.      Comments: Low muscle mass. Prominent clavicles noted.   Skin:     General: Skin is warm and dry.      Capillary Refill: Capillary refill takes less than 2 seconds.   Neurological:      Mental Status: He is alert and oriented to person, place, and time. Mental status is at baseline.   Psychiatric:         Mood and Affect: Mood normal.         No intake or output data in the 24 hours ending 12/31/22 0945    Recent Labs     12/31/22  0636   WBC 13.4*   NEUTSPOLYS 57.60   LYMPHOCYTES 24.80   MONOCYTES 8.80   EOSINOPHILS 7.30*   BASOPHILS 0.90   ASTSGOT 22   ALTSGPT 19   ALKPHOSPHAT 138*   TBILIRUBIN 0.3     Recent Labs     12/31/22  0636   SODIUM 141   POTASSIUM 5.0   CHLORIDE 105   CO2 27   BUN 27*   CREATININE 1.47*   CALCIUM 9.4     Recent Labs     12/31/22  0636   ALTSGPT 19   ASTSGOT 22   ALKPHOSPHAT 138*   TBILIRUBIN 0.3   GLUCOSE 236*         DX-CHEST-PORTABLE (1 VIEW)   Final Result      Again seen diffuse bilateral interstitial infiltrates which may represent chronic interstitial lung disease versus interstitial edema or atypical infection.          Patient Active Problem List   Diagnosis    Sepsis (HCC)    Leukocytosis    Acute on chronic respiratory failure with hypoxia (HCC)    LUIS FELIPE (acute kidney injury) (HCC)    COPD (chronic  obstructive pulmonary disease) ([facility])    Primary hypertension    Pulmonary infiltrates    Dyslipidemia    Elevated troponin    Benign prostatic hyperplasia with urinary retention    Acute on chronic systolic CHF (congestive heart failure) ([facility])    Tobacco abuse    Acute cystitis without hematuria    Renal mass, right    Protein calorie malnutrition ([facility])    Alcohol use    Severe mitral regurgitation    Chronic kidney disease, stage III (moderate) ([facility])    Type 2 diabetes mellitus with hyperglycemia, without long-term current use of insulin (HCC)    Acute respiratory failure with hypoxia and hypercapnia ([facility])    Acute exacerbation of chronic obstructive pulmonary disease (COPD) ([facility])    Hyperglycemia    Urinary tract infection    Heart failure with reduced ejection fraction and diastolic dysfunction ([facility])    Severe protein-calorie malnutrition ([facility])       Assessment and Plan:  * Acute on chronic respiratory failure with hypoxia and hypercapnia ([facility])- (present on admission)  Assessment & Plan  History of COPD and ongoing tobacco use  Wheezing and tight on arrival improved with breathing treatment  Acute respiratory acidosis on initial VBG  Started on BiPAP in ED and given dose of Lasix as well as multiple breathing treatments and patient appears to be improving  BNP elevated, however likely chronic elevation in setting longstanding severe LV systolic dysfunction  CXR hyperinflation in lungs and diffuse interstitial markings likely related to chronic COPD, in stead of fluid overload  Does endorse orthopnea, however otherwise clinical suspicion for fluid overload is low  Treat as COPD exacerbation and restart home diuretics and heart failure after load reduction therapies  Titrate SpO2 requirements 88-92%  BiPAP  IV steroids + Rocephin  DuoNeb treatments  RT protocol    Acute exacerbation of chronic obstructive pulmonary disease (COPD) ([facility])  Assessment & Plan  Unclear etiology for trigger of exacerbation  No viral  prodrome, Covid/Flu/RSV negative  Mild leukocytosis with eosinophils, unclear if reactive or secondary to bacterial infection  Procal negative, difficult to interpret  Afebrile and hemodynamically stable  IV Methylprednisolone 62.5mg q8h, taper based on clinical response  IV Rocephin  DuoNeb scheduled + PRN  Continue home triple therapy  RT protocol  SpO2 goal 88-92%    Heart failure with reduced ejection fraction and diastolic dysfunction (HCC)  Assessment & Plan  EF 20% with severe MR and grade II diastolic dysfunction on last TTE in 01/2022  CXR doesn't appear to be acutely volume overloaded and clinically does endorse orthopnea but physical exam appears dry  S/p IV Lasix in ED  Do not feel this to be an acute exacerbation of chronic systolic heart failure  Continue home Carvedilol, Lisinopril, Spironolactone  Continue home oral Lasix  Strict I/O's  Cardiac diet    Urinary tract infection  Assessment & Plan  History of self catheterization QID  UA positive LE and nitrite and WBCs and bacteria  Rocephin    Benign prostatic hyperplasia with urinary retention- (present on admission)  Assessment & Plan  History of self catheterization QID  Continue home Flomax    Severe protein-calorie malnutrition (HCC)  Assessment & Plan  BMI 15.19, thin frail elderly gentleman with bitemporal wasting, prominent clavicles, skin tenting  Nutritional consult  Continue home PO thiamine    Hyperglycemia  Assessment & Plan  Elevated on admission  A1c 5.3% in 04/2022  [facility]  Hypoglycemic protocol  Check A1c  Anticipate elevations while on steroids as well    Chronic kidney disease, stage III (moderate) (HCC)- (present on admission)  Assessment & Plan  Basleine Cr 1.4-1.5, at baseline  Avoid nephrotoxins  Renally dose medications as needed  Strict I/O's  Continue ACEi in setting chronic renal dysfunction and proteinuria    Tobacco abuse- (present on admission)  Assessment & Plan  >60 pack-year history with current 1ppd use  Nicoderm +  [place]uel[place]  Counseled on importance of tobacco cessation      VTE PPX: Heparin  GI PPX: N/A    Code Status: DNR/DNI      Ken Mccollum M.D.

## 2022-12-31 NOTE — ASSESSMENT & PLAN NOTE
>60 pack-year history with current 1ppd use  Nicoderm + Nicorette  Counseled on importance of tobacco cessation

## 2022-12-31 NOTE — ASSESSMENT & PLAN NOTE
Basleine Cr 1.4-1.5, at baseline  Avoid nephrotoxins  Renally dose medications as needed  Strict I/O's  Continue ACEi in setting chronic renal dysfunction and proteinuria

## 2022-12-31 NOTE — ASSESSMENT & PLAN NOTE
Unclear etiology for trigger of exacerbation  No viral prodrome, Covid/Flu/RSV negative  Mild leukocytosis with eosinophils, unclear if reactive or secondary to bacterial infection  Procal negative, difficult to interpret  Afebrile and hemodynamically stable  IV Methylprednisolone 62.5mg q8h, taper based on clinical response  IV Rocephin  DuoNeb scheduled + PRN  Continue home triple therapy  RT protocol  SpO2 goal 88-92%

## 2022-12-31 NOTE — ED NOTES
Rounded on pt. Pt resting in bed. Pt still on bipap. Pt states his breathing feels better.  Pt updated on POC. Call light within reach.

## 2022-12-31 NOTE — CARE PLAN
Problem: Knowledge Deficit - Standard  Goal: Patient and family/care givers will demonstrate understanding of plan of care, disease process/condition, diagnostic tests and medications  Outcome: Progressing     Problem: Fall Risk  Goal: Patient will remain free from falls  Outcome: Progressing   The patient is Watcher - Medium risk of patient condition declining or worsening    Shift Goals  Clinical Goals: wean o2 demans    Progress made toward(s) clinical / shift goals:  wean o2 demands     Patient is not progressing towards the following goals:

## 2022-12-31 NOTE — ASSESSMENT & PLAN NOTE
History of COPD and ongoing tobacco use  Wheezing and tight on arrival improved with breathing treatment  Acute respiratory acidosis on initial VBG  Started on BiPAP in ED and given dose of Lasix as well as multiple breathing treatments and patient appears to be improving  BNP elevated, however likely chronic elevation in setting longstanding severe LV systolic dysfunction  CXR hyperinflation in lungs and diffuse interstitial markings likely related to chronic COPD, in stead of fluid overload  Does endorse orthopnea, however otherwise clinical suspicion for fluid overload is low  Treat as COPD exacerbation and restart home diuretics and heart failure after load reduction therapies  Titrate SpO2 requirements 88-92%  BiPAP  IV steroids + Rocephin  DuoNeb treatments  RT protocol

## 2022-12-31 NOTE — ASSESSMENT & PLAN NOTE
BMI 15.19, thin frail elderly gentleman with bitemporal wasting, prominent clavicles, skin tenting  Nutritional consult  Continue home PO thiamine

## 2023-01-01 LAB
ALBUMIN SERPL BCP-MCNC: 3.7 G/DL (ref 3.2–4.9)
ALBUMIN/GLOB SERPL: 1.9 G/DL
ALP SERPL-CCNC: 88 U/L (ref 30–99)
ALT SERPL-CCNC: 12 U/L (ref 2–50)
ANION GAP SERPL CALC-SCNC: 14 MMOL/L (ref 7–16)
AST SERPL-CCNC: 13 U/L (ref 12–45)
BASOPHILS # BLD AUTO: 0.1 % (ref 0–1.8)
BASOPHILS # BLD: 0.01 K/UL (ref 0–0.12)
BILIRUB SERPL-MCNC: 0.3 MG/DL (ref 0.1–1.5)
BUN SERPL-MCNC: 37 MG/DL (ref 8–22)
CALCIUM ALBUM COR SERPL-MCNC: 9.1 MG/DL (ref 8.5–10.5)
CALCIUM SERPL-MCNC: 8.9 MG/DL (ref 8.5–10.5)
CHLORIDE SERPL-SCNC: 104 MMOL/L (ref 96–112)
CO2 SERPL-SCNC: 22 MMOL/L (ref 20–33)
CREAT SERPL-MCNC: 1.51 MG/DL (ref 0.5–1.4)
EOSINOPHIL # BLD AUTO: 0 K/UL (ref 0–0.51)
EOSINOPHIL NFR BLD: 0 % (ref 0–6.9)
ERYTHROCYTE [DISTWIDTH] IN BLOOD BY AUTOMATED COUNT: 45 FL (ref 35.9–50)
GFR SERPLBLD CREATININE-BSD FMLA CKD-EPI: 46 ML/MIN/1.73 M 2
GLOBULIN SER CALC-MCNC: 1.9 G/DL (ref 1.9–3.5)
GLUCOSE BLD STRIP.AUTO-MCNC: 112 MG/DL (ref 65–99)
GLUCOSE BLD STRIP.AUTO-MCNC: 114 MG/DL (ref 65–99)
GLUCOSE BLD STRIP.AUTO-MCNC: 126 MG/DL (ref 65–99)
GLUCOSE BLD STRIP.AUTO-MCNC: 143 MG/DL (ref 65–99)
GLUCOSE SERPL-MCNC: 131 MG/DL (ref 65–99)
HCT VFR BLD AUTO: 38.5 % (ref 42–52)
HGB BLD-MCNC: 12.5 G/DL (ref 14–18)
IMM GRANULOCYTES # BLD AUTO: 0.09 K/UL (ref 0–0.11)
IMM GRANULOCYTES NFR BLD AUTO: 0.7 % (ref 0–0.9)
LYMPHOCYTES # BLD AUTO: 0.55 K/UL (ref 1–4.8)
LYMPHOCYTES NFR BLD: 4.6 % (ref 22–41)
MAGNESIUM SERPL-MCNC: 2 MG/DL (ref 1.5–2.5)
MCH RBC QN AUTO: 30.6 PG (ref 27–33)
MCHC RBC AUTO-ENTMCNC: 32.5 G/DL (ref 33.7–35.3)
MCV RBC AUTO: 94.4 FL (ref 81.4–97.8)
MONOCYTES # BLD AUTO: 0.4 K/UL (ref 0–0.85)
MONOCYTES NFR BLD AUTO: 3.3 % (ref 0–13.4)
NEUTROPHILS # BLD AUTO: 10.99 K/UL (ref 1.82–7.42)
NEUTROPHILS NFR BLD: 91.3 % (ref 44–72)
NRBC # BLD AUTO: 0 K/UL
NRBC BLD-RTO: 0 /100 WBC
PHOSPHATE SERPL-MCNC: 4.9 MG/DL (ref 2.5–4.5)
PLATELET # BLD AUTO: 196 K/UL (ref 164–446)
PMV BLD AUTO: 11.8 FL (ref 9–12.9)
POTASSIUM SERPL-SCNC: 4 MMOL/L (ref 3.6–5.5)
PROT SERPL-MCNC: 5.6 G/DL (ref 6–8.2)
RBC # BLD AUTO: 4.08 M/UL (ref 4.7–6.1)
SODIUM SERPL-SCNC: 140 MMOL/L (ref 135–145)
WBC # BLD AUTO: 12 K/UL (ref 4.8–10.8)

## 2023-01-01 PROCEDURE — 83735 ASSAY OF MAGNESIUM: CPT

## 2023-01-01 PROCEDURE — 94669 MECHANICAL CHEST WALL OSCILL: CPT

## 2023-01-01 PROCEDURE — 94640 AIRWAY INHALATION TREATMENT: CPT

## 2023-01-01 PROCEDURE — 80053 COMPREHEN METABOLIC PANEL: CPT

## 2023-01-01 PROCEDURE — 99223 1ST HOSP IP/OBS HIGH 75: CPT | Performed by: HOSPITALIST

## 2023-01-01 PROCEDURE — 700102 HCHG RX REV CODE 250 W/ 637 OVERRIDE(OP): Performed by: STUDENT IN AN ORGANIZED HEALTH CARE EDUCATION/TRAINING PROGRAM

## 2023-01-01 PROCEDURE — 82962 GLUCOSE BLOOD TEST: CPT | Mod: 91

## 2023-01-01 PROCEDURE — A9270 NON-COVERED ITEM OR SERVICE: HCPCS | Performed by: STUDENT IN AN ORGANIZED HEALTH CARE EDUCATION/TRAINING PROGRAM

## 2023-01-01 PROCEDURE — 700105 HCHG RX REV CODE 258: Performed by: STUDENT IN AN ORGANIZED HEALTH CARE EDUCATION/TRAINING PROGRAM

## 2023-01-01 PROCEDURE — 84100 ASSAY OF PHOSPHORUS: CPT

## 2023-01-01 PROCEDURE — 85025 COMPLETE CBC W/AUTO DIFF WBC: CPT

## 2023-01-01 PROCEDURE — 700111 HCHG RX REV CODE 636 W/ 250 OVERRIDE (IP): Performed by: STUDENT IN AN ORGANIZED HEALTH CARE EDUCATION/TRAINING PROGRAM

## 2023-01-01 PROCEDURE — 99291 CRITICAL CARE FIRST HOUR: CPT | Performed by: INTERNAL MEDICINE

## 2023-01-01 PROCEDURE — 700101 HCHG RX REV CODE 250: Performed by: STUDENT IN AN ORGANIZED HEALTH CARE EDUCATION/TRAINING PROGRAM

## 2023-01-01 PROCEDURE — 94760 N-INVAS EAR/PLS OXIMETRY 1: CPT

## 2023-01-01 PROCEDURE — 770006 HCHG ROOM/CARE - MED/SURG/GYN SEMI*

## 2023-01-01 RX ORDER — SODIUM CHLORIDE, SODIUM LACTATE, POTASSIUM CHLORIDE, AND CALCIUM CHLORIDE .6; .31; .03; .02 G/100ML; G/100ML; G/100ML; G/100ML
500 INJECTION, SOLUTION INTRAVENOUS ONCE
Status: COMPLETED | OUTPATIENT
Start: 2023-01-01 | End: 2023-01-01

## 2023-01-01 RX ADMIN — IPRATROPIUM BROMIDE AND ALBUTEROL SULFATE 3 ML: 2.5; .5 SOLUTION RESPIRATORY (INHALATION) at 16:03

## 2023-01-01 RX ADMIN — BUDESONIDE AND FORMOTEROL FUMARATE DIHYDRATE 2 PUFF: 160; 4.5 AEROSOL RESPIRATORY (INHALATION) at 07:17

## 2023-01-01 RX ADMIN — ASPIRIN 81 MG: 81 TABLET, COATED ORAL at 17:14

## 2023-01-01 RX ADMIN — TAMSULOSIN HYDROCHLORIDE 0.4 MG: 0.4 CAPSULE ORAL at 17:15

## 2023-01-01 RX ADMIN — CEFTRIAXONE SODIUM 2000 MG: 10 INJECTION, POWDER, FOR SOLUTION INTRAVENOUS at 05:24

## 2023-01-01 RX ADMIN — SPIRONOLACTONE 12.5 MG: 25 TABLET ORAL at 05:20

## 2023-01-01 RX ADMIN — HEPARIN SODIUM 5000 UNITS: 5000 INJECTION, SOLUTION INTRAVENOUS; SUBCUTANEOUS at 05:20

## 2023-01-01 RX ADMIN — HEPARIN SODIUM 5000 UNITS: 5000 INJECTION, SOLUTION INTRAVENOUS; SUBCUTANEOUS at 21:00

## 2023-01-01 RX ADMIN — NICOTINE TRANSDERMAL SYSTEM 21 MG: 21 PATCH, EXTENDED RELEASE TRANSDERMAL at 05:20

## 2023-01-01 RX ADMIN — LISINOPRIL 20 MG: 20 TABLET ORAL at 05:21

## 2023-01-01 RX ADMIN — THIAMINE HCL TAB 100 MG 100 MG: 100 TAB at 05:21

## 2023-01-01 RX ADMIN — IPRATROPIUM BROMIDE AND ALBUTEROL SULFATE 3 ML: 2.5; .5 SOLUTION RESPIRATORY (INHALATION) at 23:30

## 2023-01-01 RX ADMIN — ATORVASTATIN CALCIUM 40 MG: 40 TABLET, FILM COATED ORAL at 17:14

## 2023-01-01 RX ADMIN — CARVEDILOL 6.25 MG: 6.25 TABLET, FILM COATED ORAL at 17:14

## 2023-01-01 RX ADMIN — TIOTROPIUM BROMIDE INHALATION SPRAY 5 MCG: 3.12 SPRAY, METERED RESPIRATORY (INHALATION) at 07:16

## 2023-01-01 RX ADMIN — IPRATROPIUM BROMIDE AND ALBUTEROL SULFATE 3 ML: 2.5; .5 SOLUTION RESPIRATORY (INHALATION) at 03:13

## 2023-01-01 RX ADMIN — HEPARIN SODIUM 5000 UNITS: 5000 INJECTION, SOLUTION INTRAVENOUS; SUBCUTANEOUS at 13:43

## 2023-01-01 RX ADMIN — SODIUM CHLORIDE, POTASSIUM CHLORIDE, SODIUM LACTATE AND CALCIUM CHLORIDE 500 ML: 600; 310; 30; 20 INJECTION, SOLUTION INTRAVENOUS at 04:22

## 2023-01-01 RX ADMIN — METHYLPREDNISOLONE SODIUM SUCCINATE 62.5 MG: 125 INJECTION, POWDER, FOR SOLUTION INTRAMUSCULAR; INTRAVENOUS at 05:20

## 2023-01-01 RX ADMIN — DOCUSATE SODIUM 50 MG AND SENNOSIDES 8.6 MG 2 TABLET: 8.6; 5 TABLET, FILM COATED ORAL at 17:15

## 2023-01-01 RX ADMIN — IPRATROPIUM BROMIDE AND ALBUTEROL SULFATE 3 ML: 2.5; .5 SOLUTION RESPIRATORY (INHALATION) at 11:05

## 2023-01-01 RX ADMIN — IPRATROPIUM BROMIDE AND ALBUTEROL SULFATE 3 ML: 2.5; .5 SOLUTION RESPIRATORY (INHALATION) at 07:16

## 2023-01-01 RX ADMIN — LISINOPRIL 20 MG: 20 TABLET ORAL at 17:15

## 2023-01-01 RX ADMIN — DOCUSATE SODIUM 50 MG AND SENNOSIDES 8.6 MG 2 TABLET: 8.6; 5 TABLET, FILM COATED ORAL at 05:21

## 2023-01-01 RX ADMIN — CARVEDILOL 6.25 MG: 6.25 TABLET, FILM COATED ORAL at 09:22

## 2023-01-01 RX ADMIN — FOLIC ACID 1 MG: 1 TABLET ORAL at 05:21

## 2023-01-01 ASSESSMENT — ENCOUNTER SYMPTOMS
ROS GI COMMENTS: TOLERATING A DIET
CHILLS: 0
SHORTNESS OF BREATH: 1
WHEEZING: 1
FEVER: 0

## 2023-01-01 ASSESSMENT — PAIN DESCRIPTION - PAIN TYPE
TYPE: ACUTE PAIN

## 2023-01-01 NOTE — ASSESSMENT & PLAN NOTE
Echocardiogram reveals an ejection fraction of 20%  Goal-directed therapy with Aldactone, lisinopril, Coreg, and Lasix

## 2023-01-01 NOTE — CARE PLAN
The patient is Stable - Low risk of patient condition declining or worsening    Shift Goals  Clinical Goals: improve repiratory status  Patient Goals: comfort  Family Goals: FARRAH    Progress made toward(s) clinical / shift goals:    Problem: Skin Integrity  Goal: Skin integrity is maintained or improved  Outcome: Progressing     Problem: Fall Risk  Goal: Patient will remain free from falls  Outcome: Progressing        labor/delivery

## 2023-01-01 NOTE — PROGRESS NOTES
"I have seen and examined the patient today.  I have reviewed the medical record, laboratory data, imaging and all relevant studies.  I have discussed the plan of care with the Internal Medicine Resident and agree with the note and plan as documented with the following additions and clarifications:    HPI:  82 y.o. male admitted 12/31/2022 with resp distress, aeCOPD, UTI     Hospital Course  \"82-year-old gentleman with a history of chronic hypoxemic respiratory failure on 4 L of domiciliary oxygen, COPD, chronic combined systolic and diastolic heart failure with an LVEF of 20% and grade 2 diastolic dysfunction, severe mitral regurgitation, diabetes mellitus type 2, stage III CKD and dyslipidemia.  He presents to the ED via EMS complaining of increasing dyspnea.  He arrived in significant respiratory distress was only able to speak in one-word answers (which clearly not even a phrase, let alone a sentence.)  He was placed on BiPAP for respiratory distress and has improved.  He has been experiencing orthopnea.  He had some chills last night, but no fever or sweats.  He [facility] me [facility] with chest congestion and increasing shortness of breath.  [facility], his cough has improved.  It is now productive of very little sputum.  Denies any hemoptysis.  He denies any angina, palpitations, syncope or edema.  He self caths 4 times daily.\" ~ Dr Wells 12/31 1/1 -no further need for BiPAP, back to baseline 4 LPM oxygen, taper corticosteroids, transfer out of ICU      Interval Events:    T-max 99  WBC 13.4-> 12  Off BiPAP all night, 4 LPM oxygen (baseline home oxygen 4 L)      A/P:  Acute on chronic hypercarbic and hypoxemic respiratory failure  -placed on rescue BiPAP for respiratory distress  -Clinically improved, back to baseline home supplemental oxygen 4 LPM     Acute exacerbation of COPD  -Flu, RSV and SARS-CoV-2 " negative  -Methylprednisolone; change to PO prednisone  -BDs, RT protocol       Chronic combined systolic and diastolic failure  -LVEF 20%  -Grade 2 diastolic dysfunction  -Continue carvedilol, lisinopril, spironolactone, furosemide     Severe mitral regurgitation     Pyuria  -self caths 4 times a day  -ceftriaxone  -BC x2 no growth to date, follow-up urine culture     Diabetes mellitus type 2  -Glycohemoglobin 5.7  -Sliding scale insulin     Stage III CKD  -Monitor renal function and urine output  -Avoid nephrotoxins     Dyslipidemia  -Atorvastatin     Tobacco abuse  -He continues to smoke 1 package of cigarettes a day  -Cessation counseling     Daily alcohol use  -reports 2 fingers of Gt [place] steinbergon with a splash of water each evening (a bottle will last him 8 to 10 days)  -monitor for w/d  -thiamine, folate, MVI     DNR/DNI status  Transferred to ICU today  The patient remains critically ill.  Critical care time = 33 minutes in directly providing and coordinating critical care and extensive data review.  No time overlap and excludes procedures.

## 2023-01-01 NOTE — CARE PLAN
Problem: Bronchoconstriction  Goal: Improve in air movement and diminished wheezing  Description: Target End Date:  2 to 3 days    1.  Implement inhaled treatments  2.  Evaluate and manage medication effects  Outcome: Progressing       Respiratory Update    Treatment modality:  Duo Q4  Frequency:    Pt tolerating current treatments well with no adverse reactions.

## 2023-01-01 NOTE — ASSESSMENT & PLAN NOTE
Treated with bronchodilators, rescue BiPAP, IV Solu-Medrol and ICU admission  Smoking cessation discussed  Pulmonary consultation on 1/2/2023    Patient does report improvement however continues to have coughing fits and episodes of hypoxia at night  He is anxious being discharged and needing to contact EMS, working with case management to see if patient can qualify for a nebulizer for home  Nebulizer and home health orders placed

## 2023-01-01 NOTE — ASSESSMENT & PLAN NOTE
He is on 4 L nasal cannula oxygen is his baseline and had acute hypoxia with hypercapnia requiring ICU admission with rescue BiPAP

## 2023-01-01 NOTE — PROGRESS NOTES
Report given to Claudette RN. Patient transported by Transport with all belongings via wheelchair. On 4L nasal cannula.

## 2023-01-01 NOTE — PROGRESS NOTES
2 RN Skin Assessment Completed by MER Anaya and Otto RN.    Head: WDL  Ears: redness and blanching  Nose: WDL  Mouth: WDL  Neck: WDL  Breasts/Chest: WDL  Shoulder Blades: WDL  Spine: WDL  (R) Arm/Elbow/hand: redness and blanching  (L) Arm/Elbow/hand: redness and blanching  Abdomen:WDL  Groin: WDL  Sacrum/Coccyx/Buttocks: redness and blanching  (R) Leg: WDL  (L) Leg: WDL  (R) Heel/Foot/Toe: redness and blanching  (L) Heel/Foot/Toe: redness and blanching          Devices in place: ECG, Tele Box, BP Cuff, Pulse Ox, and Melara Temperature    Interventions in place: Gray ear foams, NC with ear foams, Heel Mepilex, Sacral Mepilex, Pillows, Elbow Mepilex, Q2 turns, Low air loss mattress , and Heels floated with pillows    Possible skin injury found: No    Pictures uploaded into Epic: N/A  Wound Consult Placed: N/A

## 2023-01-01 NOTE — ASSESSMENT & PLAN NOTE
He undergoes self catheterization at home  A mayorga was placed in the ICU   Patient uncomfortable with the straight cath supplies we have in the hospital, will plan to continue Mayorga catheter on day of discharge and patient can continue to straight cath at home  Home health orders placed

## 2023-01-01 NOTE — ASSESSMENT & PLAN NOTE
This is a complicated urinary tract infection given his self-catheterization  He has been initiated on IV Rocephin  Urine culture grew staph epidermidis  Antibiotics de-escalated to doxycycline

## 2023-01-01 NOTE — CONSULTS
Hospital Medicine Consultation    Date of Service  1/1/2023    Referring Physician  Lobo Roldan M.D.    Consulting Physician  Nino Moss M.D.    Reason for Consultation  COPD exacerbation.    History of Presenting Illness  82 y.o. male who presented 12/31/2022 with shortness of breath.  Mr. Doherty has a past medical history of oxygen dependent COPD on 4 L oxygen as well as known cardiomyopathy with ejection fraction 20% that was recently admitted to the [facility] last week because of respiratory complaints and was there for 2 nights.  States after his discharge is symptoms improved apparently until 12/31/2022 paramedics were called and found his oxygen saturations to be 68% on his usual 4 L.  Was found to have a COPD exacerbation is admitted to the intensive care unit for IV Solu-Medrol, bronchodilators, and rescue BiPAP.  His viral panel was negative.  Upon admission he had made it clear that he has a known DO NOT RESUSCITATE DO NOT INTUBATE status.  In the emergency room he is found to have a positive urinalysis and was started on IV Rocephin as he self catheterizes about 4 times per day at home.    Review of Systems  Review of Systems   Constitutional:  Negative for chills and fever.   Respiratory:  Positive for shortness of breath and wheezing.    Cardiovascular:  Negative for chest pain.   Gastrointestinal:         Tolerating a diet   All other systems reviewed and are negative.    Past Medical History   has a past medical history of Benign tumor of brain (HCC).  Oxygen dependent COPD  Cardiomyopathy   Chronic urinary retention for which he undergoes self-catheterization    Surgical History   has a past surgical history that includes other orthopedic surgery.    Family History  No family history of lung problems to his knowledge    Social History   reports that he has been smoking cigarettes. He has been smoking an average of 1 pack per day. He has never used smokeless tobacco. He reports current  alcohol use of about 3.6 oz per week. He reports that he does not use drugs.he lives in a mobile home. He does not have children    Medications  Prior to Admission Medications   Prescriptions Last Dose Informant Patient Reported? Taking?   Home Care Oxygen   Yes Yes   Sig: Inhale 4 L/min continuous. [facility]   albuterol 108 (90 Base) MCG/ACT Aero Soln inhalation aerosol [facility] at [facility] No No   Sig: Inhale 2 Puffs every 6 hours as needed for Shortness of Breath for up to 360 days.   aspirin EC (ECOTRIN) 81 MG Tablet Delayed Response 12/30/2022 at [facility], Patient Yes No   Sig: Take 81 mg by mouth every evening.   atorvastatin (LIPITOR) 40 MG Tab [facility] at [facility] Yes No   Sig: Take 40 mg by mouth every day.   carvedilol (COREG) 6.25 MG Tab [facility] at [facility] Yes No   Sig: Take 6.25 mg by mouth 2 times a day with meals.   fluticasone-salmeterol (ADVAIR) 250-50 MCG/DOSE AEROSOL POWDER, BREATH ACTIVATED [facility] at [facility] Yes No   Sig: Inhale 1 Puff every 12 hours.   folic acid (FOLVITE) 1 MG Tab [facility] at [facility] No No   Sig: Take 1 Tablet by mouth every day for 360 days.   furosemide (LASIX) 40 MG Tab [facility] at [facility] No No   Sig: Take 0.5 Tablet by mouth every day for 30 days. Do not take this medication if you feel dizzy   guaifenesin 200 MG tablet 12/30/2022 at [facility], Patient Yes Yes   Sig: Take 400 mg by mouth every 4 hours.   lisinopril (PRINIVIL) 40 MG tablet 12/30/2022 at [facility], Patient Yes No   Sig: Take 40 mg by mouth every day.   polyethylene glycol/lytes (MIRALAX) 17 g Pack [facility] at [facility] Yes Yes   Sig: Take 17 g by mouth every day.   spironolactone (ALDACTONE) 25 MG Tab [facility] at [facility] No No   Sig: Take 0.5 Tablet by mouth every day.   terbinafine (LAMISIL) 250 MG Tab [facility]  at [facility] Yes Yes   Sig: Take 250 mg by mouth every day. For 12 weeks   thiamine (THIAMINE) 100 MG tablet [facility] at [facility] No No   Sig: Take 1 Tablet by mouth every day for 360 days.   tiotropium (SPIRIVA RESPIMAT) 2.5 mcg/Act Aero Soln [facility] at [facility] Yes No   Sig: Inhale 5 mcg every day.   vitamin D3 (CHOLECALCIFEROL) 1000 Unit (25 mcg) Tab [facility] at [facility] Yes Yes   Sig: Take 2,000 Units by mouth every day.      Facility-Administered Medications: None       Allergies  No Known Allergies    Physical Exam  Temp:  [36.4 °C (97.5 °F)-36.8 °C (98.2 °F)] 36.4 °C (97.5 °F)  Pulse:  [64-94] 74  Resp:  [17-71] 17  BP: ()/() 127/67  SpO2:  [96 %-100 %] 99 %    Physical Exam  Vitals and nursing note reviewed.   Constitutional:       Appearance: He is ill-appearing. He is not toxic-appearing.      Comments: Very thin   HENT:      Head: Normocephalic and atraumatic.      Mouth/Throat:      Mouth: Mucous membranes are moist.      Pharynx: Oropharynx is clear.      Comments: Poor dentition  Eyes:      General: No scleral icterus.     Conjunctiva/sclera: Conjunctivae normal.   Cardiovascular:      Rate and Rhythm: Normal rate and regular rhythm.      Comments: Distant heart sounds  Pulmonary:      Comments: Nasal cannula oxygen  Prolonged expiratory phase  Slight expiratory wheezes  Abdominal:      General: There is no distension.      Palpations: Abdomen is soft.      Tenderness: There is no abdominal tenderness.   Genitourinary:     Comments: Melara catheter  Musculoskeletal:      Cervical back: Normal range of motion and neck supple.      Right lower leg: No edema.      Left lower leg: No edema.   Skin:     General: Skin is warm and dry.      Comments: Severe onychomycosis toenails   Neurological:      General: No focal deficit present.      Mental Status: He is alert and oriented to person, place, and time.   Psychiatric:       Comments: Quite talkative and in good spirits       Fluids  Date 01/01/23 0700 - 01/02/23 0659   Shift 0998-1900 4066-1317 0832-4869 24 Hour Total   INTAKE   P.O. 350   350   Shift Total 350   350   OUTPUT   Urine 80   80   Shift Total 80   80   Weight (kg) 48.4 48.4 48.4 48.4       Laboratory  Recent Labs     12/31/22  0636 01/01/23  0535   WBC 13.4* 12.0*   RBC 5.16 4.08*   HEMOGLOBIN 16.0 12.5*   HEMATOCRIT 50.5 38.5*   MCV 97.9* 94.4   MCH 31.0 30.6   MCHC 31.7* 32.5*   RDW 47.6 45.0   PLATELETCT 289 196   MPV 11.6 11.8     Recent Labs     12/31/22  0636 01/01/23  0535   SODIUM 141 140   POTASSIUM 5.0 4.0   CHLORIDE 105 104   CO2 27 22   GLUCOSE 236* 131*   BUN 27* 37*   CREATININE 1.47* 1.51*   CALCIUM 9.4 8.9                     Imaging  DX-CHEST-PORTABLE (1 VIEW)   Final Result      Again seen diffuse bilateral interstitial infiltrates which may represent chronic interstitial lung disease versus interstitial edema or atypical infection.          Assessment/Plan  * Acute on chronic respiratory failure with hypoxia and hypercapnia (HCC)- (present on admission)  Assessment & Plan  He is on 4 L nasal cannula oxygen is his baseline and had acute hypoxia with hypercapnia requiring ICU admission with rescue BiPAP    Severe protein-calorie malnutrition (HCC)- (present on admission)  Assessment & Plan  There is a clinical diagnosis present upon admission in setting of severe cachexia in the secondary to advanced COPD.  His BMI is 14    Heart failure with reduced ejection fraction and diastolic dysfunction (HCC)- (present on admission)  Assessment & Plan  Echocardiogram reveals an ejection fraction of 20%  Goal-directed therapy with Aldactone, lisinopril, Coreg, and Lasix      Urinary tract infection- (present on admission)  Assessment & Plan  This is a complicated urinary tract infection given his self-catheterization  He has been initiated on IV Rocephin and await cultures    Acute exacerbation of chronic obstructive  pulmonary disease (COPD) ([facility])- (present on admission)  Assessment & Plan  Treated with bronchodilators, rescue BiPAP, IV Solu-Medrol and ICU admission  Smoking cessation discussed  Pulmonary consultation on 1/2/2023    Chronic kidney disease, stage III (moderate) ([facility])- (present on admission)  Assessment & Plan  Baseline is unknown   His Cr has been between 1.5 and 1.7    Tobacco abuse- (present on admission)  Assessment & Plan  Cessation discussed      Benign prostatic hyperplasia with urinary retention- (present on admission)  Assessment & Plan  He undergoes self catheterization at home  A mayorga was placed in the ICU and will be removed on 1/2

## 2023-01-01 NOTE — ASSESSMENT & PLAN NOTE
There is a clinical diagnosis present upon admission in setting of severe cachexia in the secondary to advanced COPD.  His BMI is 14  Supplements and encourage oral intake

## 2023-01-01 NOTE — PROGRESS NOTES
Monitor summary:     0.17/0.11/0.42     Sinus Catarino/Sinus Rhythm 48 - 99     with BBB, PACs, PVCs, bigem, trigem

## 2023-01-02 LAB
BACTERIA UR CULT: ABNORMAL
BACTERIA UR CULT: ABNORMAL
SIGNIFICANT IND 70042: ABNORMAL
SITE SITE: ABNORMAL
SOURCE SOURCE: ABNORMAL

## 2023-01-02 PROCEDURE — A9270 NON-COVERED ITEM OR SERVICE: HCPCS | Performed by: STUDENT IN AN ORGANIZED HEALTH CARE EDUCATION/TRAINING PROGRAM

## 2023-01-02 PROCEDURE — 700111 HCHG RX REV CODE 636 W/ 250 OVERRIDE (IP): Performed by: STUDENT IN AN ORGANIZED HEALTH CARE EDUCATION/TRAINING PROGRAM

## 2023-01-02 PROCEDURE — 94640 AIRWAY INHALATION TREATMENT: CPT

## 2023-01-02 PROCEDURE — 99232 SBSQ HOSP IP/OBS MODERATE 35: CPT | Performed by: NURSE PRACTITIONER

## 2023-01-02 PROCEDURE — 700102 HCHG RX REV CODE 250 W/ 637 OVERRIDE(OP): Performed by: STUDENT IN AN ORGANIZED HEALTH CARE EDUCATION/TRAINING PROGRAM

## 2023-01-02 PROCEDURE — 770006 HCHG ROOM/CARE - MED/SURG/GYN SEMI*

## 2023-01-02 PROCEDURE — 94760 N-INVAS EAR/PLS OXIMETRY 1: CPT

## 2023-01-02 PROCEDURE — 700111 HCHG RX REV CODE 636 W/ 250 OVERRIDE (IP): Performed by: INTERNAL MEDICINE

## 2023-01-02 PROCEDURE — 700101 HCHG RX REV CODE 250: Performed by: STUDENT IN AN ORGANIZED HEALTH CARE EDUCATION/TRAINING PROGRAM

## 2023-01-02 PROCEDURE — 94669 MECHANICAL CHEST WALL OSCILL: CPT

## 2023-01-02 RX ORDER — IPRATROPIUM BROMIDE AND ALBUTEROL SULFATE 2.5; .5 MG/3ML; MG/3ML
3 SOLUTION RESPIRATORY (INHALATION)
Status: DISCONTINUED | OUTPATIENT
Start: 2023-01-03 | End: 2023-01-04

## 2023-01-02 RX ORDER — NITROFURANTOIN 25; 75 MG/1; MG/1
100 CAPSULE ORAL 2 TIMES DAILY WITH MEALS
Status: DISCONTINUED | OUTPATIENT
Start: 2023-01-02 | End: 2023-01-02

## 2023-01-02 RX ORDER — BUDESONIDE AND FORMOTEROL FUMARATE DIHYDRATE 160; 4.5 UG/1; UG/1
2 AEROSOL RESPIRATORY (INHALATION) 2 TIMES DAILY
Status: DISCONTINUED | OUTPATIENT
Start: 2023-01-02 | End: 2023-01-06 | Stop reason: HOSPADM

## 2023-01-02 RX ADMIN — FUROSEMIDE 20 MG: 20 TABLET ORAL at 05:44

## 2023-01-02 RX ADMIN — IPRATROPIUM BROMIDE AND ALBUTEROL SULFATE 3 ML: 2.5; .5 SOLUTION RESPIRATORY (INHALATION) at 13:04

## 2023-01-02 RX ADMIN — CARVEDILOL 6.25 MG: 6.25 TABLET, FILM COATED ORAL at 08:23

## 2023-01-02 RX ADMIN — CEFTRIAXONE SODIUM 2000 MG: 10 INJECTION, POWDER, FOR SOLUTION INTRAVENOUS at 05:43

## 2023-01-02 RX ADMIN — DOCUSATE SODIUM 50 MG AND SENNOSIDES 8.6 MG 2 TABLET: 8.6; 5 TABLET, FILM COATED ORAL at 05:44

## 2023-01-02 RX ADMIN — CARVEDILOL 6.25 MG: 6.25 TABLET, FILM COATED ORAL at 17:26

## 2023-01-02 RX ADMIN — TAMSULOSIN HYDROCHLORIDE 0.4 MG: 0.4 CAPSULE ORAL at 17:26

## 2023-01-02 RX ADMIN — PREDNISONE 30 MG: 20 TABLET ORAL at 05:44

## 2023-01-02 RX ADMIN — HEPARIN SODIUM 5000 UNITS: 5000 INJECTION, SOLUTION INTRAVENOUS; SUBCUTANEOUS at 15:50

## 2023-01-02 RX ADMIN — NICOTINE TRANSDERMAL SYSTEM 21 MG: 21 PATCH, EXTENDED RELEASE TRANSDERMAL at 05:45

## 2023-01-02 RX ADMIN — IPRATROPIUM BROMIDE AND ALBUTEROL SULFATE 3 ML: 2.5; .5 SOLUTION RESPIRATORY (INHALATION) at 19:00

## 2023-01-02 RX ADMIN — HEPARIN SODIUM 5000 UNITS: 5000 INJECTION, SOLUTION INTRAVENOUS; SUBCUTANEOUS at 05:43

## 2023-01-02 RX ADMIN — DOCUSATE SODIUM 50 MG AND SENNOSIDES 8.6 MG 2 TABLET: 8.6; 5 TABLET, FILM COATED ORAL at 17:26

## 2023-01-02 RX ADMIN — BUDESONIDE AND FORMOTEROL FUMARATE DIHYDRATE 2 PUFF: 160; 4.5 AEROSOL RESPIRATORY (INHALATION) at 19:15

## 2023-01-02 RX ADMIN — THIAMINE HCL TAB 100 MG 100 MG: 100 TAB at 05:44

## 2023-01-02 RX ADMIN — LISINOPRIL 20 MG: 20 TABLET ORAL at 05:44

## 2023-01-02 RX ADMIN — IPRATROPIUM BROMIDE AND ALBUTEROL SULFATE 3 ML: 2.5; .5 SOLUTION RESPIRATORY (INHALATION) at 15:25

## 2023-01-02 RX ADMIN — LISINOPRIL 20 MG: 20 TABLET ORAL at 17:26

## 2023-01-02 RX ADMIN — IPRATROPIUM BROMIDE AND ALBUTEROL SULFATE 3 ML: 2.5; .5 SOLUTION RESPIRATORY (INHALATION) at 07:37

## 2023-01-02 RX ADMIN — FOLIC ACID 1 MG: 1 TABLET ORAL at 05:44

## 2023-01-02 RX ADMIN — ATORVASTATIN CALCIUM 40 MG: 40 TABLET, FILM COATED ORAL at 17:26

## 2023-01-02 RX ADMIN — SPIRONOLACTONE 12.5 MG: 25 TABLET ORAL at 05:44

## 2023-01-02 RX ADMIN — BUDESONIDE AND FORMOTEROL FUMARATE DIHYDRATE 2 PUFF: 160; 4.5 AEROSOL RESPIRATORY (INHALATION) at 07:41

## 2023-01-02 RX ADMIN — ASPIRIN 81 MG: 81 TABLET, COATED ORAL at 17:26

## 2023-01-02 ASSESSMENT — ENCOUNTER SYMPTOMS
FALLS: 0
DIARRHEA: 0
VOMITING: 0
FEVER: 0
COUGH: 1
NAUSEA: 0
FOCAL WEAKNESS: 0
DIZZINESS: 0
SHORTNESS OF BREATH: 1
NERVOUS/ANXIOUS: 1
WHEEZING: 1
CHILLS: 0

## 2023-01-02 NOTE — PROGRESS NOTES
4 Eyes Skin Assessment Completed by MER Larry and MER Brand.    Head WDL  Ears DiscolorationScab behind left ear   Nose WDL  Mouth WDL  Neck WDL  Breast/Chest WDL  Shoulder Blades WDL  Spine WDL  (R) Arm/Elbow/Hand WDL  (L) Arm/Elbow/Hand WDL  Abdomen WDL  Groin WDL  Scrotum/Coccyx/Buttocks WDL  (R) Leg WDL  (L) Leg WDL  (R) Heel/Foot/Toe WDL  (L) Heel/Foot/Toe WDL          Devices In Places Nasal Cannula      Interventions In Place Gray Ear Foams    Possible Skin Injury No    Pictures Uploaded Into Epic N/A  Wound Consult Placed N/A  RN Wound Prevention Protocol Ordered No

## 2023-01-02 NOTE — CARE PLAN
The patient is Stable - Low risk of patient condition declining or worsening    Shift Goals  Clinical Goals: comfort  Patient Goals: pt will be able to rest and sleep comfortably  Family Goals: FARRAH    Progress made toward(s) clinical / shift goals:      Patient is not progressing towards the following goals:

## 2023-01-02 NOTE — HOSPITAL COURSE
82 y.o. male who presented 12/31/2022 with shortness of breath.  Mr. Doherty has a past medical history of oxygen dependent COPD on 4 L oxygen as well as known cardiomyopathy with ejection fraction 20% that was recently admitted to the [facility] last week because of respiratory complaints and was there for 2 nights.  States after his discharge is symptoms improved apparently until 12/31/2022 paramedics were called and found his oxygen saturations to be 68% on his usual 4 L.  Was found to have a COPD exacerbation is admitted to the intensive care unit for IV Solu-Medrol, bronchodilators, and rescue BiPAP.  His viral panel was negative.  Upon admission he had made it clear that he has a known DO NOT RESUSCITATE DO NOT INTUBATE status.  In the emergency room he is found to have a positive urinalysis and was started on IV Rocephin as he self catheterizes about 4 times per day at home.    Patient transitioned off ICU on 1/2/23 and remained in the hospital while he continued to improve.  COPD navigator worked with the patient and he was given a nebulizer with albuterol for discharge.  He did experience some diarrhea however his C. difficile testing was negative.  Dietary saw the patient and recommends oral supplementation.  [facility] who will follow him.    On the day of discharge, patient seen and examined.  He has no acute complaints and is afebrile and hemodynamically stable.  His pulmonary medications have been refilled and he has [facility]  He was given 2 more days of oral doxycycline and Atarax to help with his anxiety.  He reports that he has all of his cardiac medications already.  He also says he has plenty of oxygen at home but will require a tank to get home, delivered by [facility].  His Melara has been discontinued and he reports having plenty of his straight cath supplies at home.  He knows that home health and the [facility] navigators  will be reaching out to him within the next couple days for follow-up.  He will be taught how to use his nebulizer prior to discharge.  I also emphasized to him that he needs to follow-up with his primary care provider within 1 week.  He has been given a taxi voucher to travel home.    Plan of care discussed with patient and all questions answered.

## 2023-01-02 NOTE — PROGRESS NOTES
[facility] Medicine Daily Progress Note    Date of Service  1/2/2023    Chief Complaint  Laron Doherty is a 82 y.o. male admitted 12/31/2022 with shortnes of breath    Hospital Course  82 y.o. male who presented 12/31/2022 with shortness of breath.  Mr. 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  States after his discharge is symptoms improved apparently until 12/31/2022 paramedics were called and found his oxygen saturations to be 68% on his usual 4 L.  Was found to have a COPD exacerbation is admitted to the intensive care unit for IV Solu-Medrol, bronchodilators, and rescue BiPAP.  His viral panel was negative.  Upon admission he had made it clear that he has a known DO NOT RESUSCITATE DO NOT INTUBATE status.  In the emergency room he is found to have a positive urinalysis and was started on IV Rocephin as he self catheterizes about 4 times per day at home.    Patient transitioned off ICU on 1/2/23.     Interval Problem Update  Patient sitting up in bed, remains on 4 L which is his baseline.  States that overnight he did have a severe coughing fit and required nebulizer treatment.  Does feel that he has improved since ICU stay.    I have discussed this patient's plan of care and discharge plan at IDT rounds today with Case Management, Nursing, Nursing leadership, and other members of the IDT team.    Consultants/Specialty  critical care    Code Status  DNAR/DNI    Disposition  Patient is not medically cleared for discharge.   Anticipate discharge to to home with close outpatient follow-up.  I have placed the appropriate orders for post-discharge needs.    Review of Systems  Review of Systems   Constitutional:  Negative for chills, fever and malaise/fatigue.   Respiratory:  Positive for cough, shortness of  breath and wheezing.    Cardiovascular:  Negative for chest pain and leg swelling.   Gastrointestinal:  Negative for diarrhea, nausea and vomiting.   Genitourinary:  Negative for dysuria.   Musculoskeletal:  Negative for falls.   Neurological:  Negative for dizziness and focal weakness.   Psychiatric/Behavioral:  The patient is nervous/anxious.       Physical Exam  Temp:  [36.2 °C (97.2 °F)-36.6 °C (97.9 °F)] 36.6 °C (97.8 °F)  Pulse:  [67-88] 67  Resp:  [16-18] 18  BP: (103-144)/(63-81) 136/80  SpO2:  [95 %-99 %] 95 %    Physical Exam  Vitals and nursing note reviewed.   Constitutional:       General: He is not in acute distress.     Appearance: He is cachectic. He is ill-appearing.      Interventions: Nasal cannula in place.   HENT:      Head: Normocephalic and atraumatic.      Mouth/Throat:      Mouth: Mucous membranes are moist.   Cardiovascular:      Rate and Rhythm: Normal rate and regular rhythm.      Pulses: Normal pulses.      Heart sounds: Normal heart sounds. No murmur heard.  Pulmonary:      Effort: Pulmonary effort is normal.      Breath sounds: Normal breath sounds. No decreased air movement. No wheezing.   Abdominal:      General: There is no distension.      Palpations: Abdomen is soft.      Tenderness: There is no abdominal tenderness. There is no guarding.   Musculoskeletal:      Right lower leg: No edema.      Left lower leg: No edema.   Neurological:      General: No focal deficit present.      Mental Status: He is alert and oriented to person, place, and time.      Cranial Nerves: No cranial nerve deficit.      Motor: No weakness.   Psychiatric:         Attention and Perception: Attention normal.         Mood and Affect: Mood normal.         Speech: Speech normal.         Behavior: Behavior normal. Behavior is cooperative.       Fluids    Intake/Output Summary (Last 24 hours) at 1/2/2023 1249  Last data filed at 1/2/2023 1141  Gross per 24 hour   Intake 610 ml   Output 1550 ml   Net -940 ml        Laboratory  Recent Labs     12/31/22  0636 01/01/23  0535   WBC 13.4* 12.0*   RBC 5.16 4.08*   HEMOGLOBIN 16.0 12.5*   HEMATOCRIT 50.5 38.5*   MCV 97.9* 94.4   MCH 31.0 30.6   MCHC 31.7* 32.5*   RDW 47.6 45.0   PLATELETCT 289 196   MPV 11.6 11.8     Recent Labs     12/31/22  0636 01/01/23  0535   SODIUM 141 140   POTASSIUM 5.0 4.0   CHLORIDE 105 104   CO2 27 22   GLUCOSE 236* 131*   BUN 27* 37*   CREATININE 1.47* 1.51*   CALCIUM 9.4 8.9                   Imaging  DX-CHEST-PORTABLE (1 VIEW)   Final Result      Again seen diffuse bilateral interstitial infiltrates which may represent chronic interstitial lung disease versus interstitial edema or atypical infection.           Assessment/Plan  * Acute on chronic respiratory failure with hypoxia and hypercapnia (HCC)- (present on admission)  Assessment & Plan  He is on 4 L nasal cannula oxygen is his baseline and had acute hypoxia with hypercapnia requiring ICU admission with rescue BiPAP    Severe protein-calorie malnutrition (HCC)- (present on admission)  Assessment & Plan  There is a clinical diagnosis present upon admission in setting of severe cachexia in the secondary to advanced COPD.  His BMI is 14    Heart failure with reduced ejection fraction and diastolic dysfunction (HCC)- (present on admission)  Assessment & Plan  Echocardiogram reveals an ejection fraction of 20%  Goal-directed therapy with Aldactone, lisinopril, Coreg, and Lasix      Urinary tract infection- (present on admission)  Assessment & Plan  This is a complicated urinary tract infection given his self-catheterization  He has been initiated on IV Rocephin and await cultures    Acute exacerbation of chronic obstructive pulmonary disease (COPD) (HCC)- (present on admission)  Assessment & Plan  Treated with bronchodilators, rescue BiPAP, IV Solu-Medrol and ICU admission  Smoking cessation discussed  Pulmonary consultation on 1/2/2023    Patient does report improvement however continues to have  coughing fits and episodes of hypoxia at night  He is anxious being discharged and needing to contact EMS, working with case management to see if patient can qualify for a nebulizer for home    Chronic kidney disease, stage III (moderate) (HCC)- (present on admission)  Assessment & Plan  Baseline is unknown   His Cr has been between 1.5 and 1.7    Tobacco abuse- (present on admission)  Assessment & Plan  Cessation discussed      Benign prostatic hyperplasia with urinary retention- (present on admission)  Assessment & Plan  He undergoes self catheterization at home  A mayorga was placed in the ICU   Patient uncomfortable with the straight cath supplies we have in the hospital, will plan to continue Mayorga catheter on day of discharge and patient can continue to straight cath at home         VTE prophylaxis: heparin ppx    I have performed a physical exam and reviewed and updated ROS and Plan today (1/2/2023). In review of yesterday's note (1/1/2023), there are no changes except as documented above.

## 2023-01-02 NOTE — PROGRESS NOTES
"Pt on moderate fall risk. Pt was told he needs bed alarm for safety. Pt refused and said \"I cant sleep with that alarm on because I turn over\". Safety education provided. Still, pt refused. Reinforced the use of call light when needing assistance. Charge nurse Camryn grier.  "

## 2023-01-02 NOTE — DIETARY
Nutrition services: Day 2 of admit.  Laron Doherty is a 82 y.o. male with admitting DX of acute respiratory failure with hypoxia and hypercapnia.     Consult received for BMI <19 and severe protein calorie malnutrition in hospital problem list. RD attempted to visit pt at bedside. Pt sleeping during RD visit attempt and did not wake to name.       Assessment:  Height: 182.9 cm (6')  Weight: 48.4 kg (106 lb 11.2 oz) via bed scale  Body mass index is 14.47 kg/m²., BMI classification: underweight   Diet/Intake: Cardiac. Intake % of all recorded meals    Evaluation:   Pt with BMI <19. Pt thin appearing on visual exam however pt sleeping and under blankets unable to preform NFPE. Per chart review pt with wt loss noted in the last ~ 1 year. Pt down 15 lbs (12%) in ~1 year and down 11 lbs (9.4%) in ~ 6 months. Notable though not clinically significant weight loss.   Current clinical picture and MD progress notes reviewed. Pt with advanced COPD, baseline 4 L O2  Labs (1/1) Glu 131 (H), Phos 4.9 (H)  Meds: folic acid, lasix, prednisone, senna, thiamine  Skin: no noted staged wounds or pressure injuries   +BM 1/1    Malnutrition Risk: Pt with previous severe protein calorie malnutrition diagnosis in January 2022 due to COPD. Pt is at risk with continued wt loss over last year (12%) and BMI <19 however unable to meet criteria at this time per ASPEN guidelines.     Recommendations/Plan:  Cardiac diet as tolerated   Encourage intake of all meals 50%. If intake <50% recommend nutritional supplements to bolster nutrition while in acute care   Document intake of all meals as % taken in ADL's to provide interdisciplinary communication across all shifts.   Obtain updated weight.  Nutrition rep will continue to see patient for ongoing meal and snack preferences.     RD to monitor per department policy

## 2023-01-03 LAB
ANION GAP SERPL CALC-SCNC: 10 MMOL/L (ref 7–16)
BASOPHILS # BLD AUTO: 0.2 % (ref 0–1.8)
BASOPHILS # BLD: 0.02 K/UL (ref 0–0.12)
BUN SERPL-MCNC: 40 MG/DL (ref 8–22)
CALCIUM SERPL-MCNC: 9 MG/DL (ref 8.5–10.5)
CHLORIDE SERPL-SCNC: 104 MMOL/L (ref 96–112)
CO2 SERPL-SCNC: 25 MMOL/L (ref 20–33)
CREAT SERPL-MCNC: 1.29 MG/DL (ref 0.5–1.4)
EOSINOPHIL # BLD AUTO: 0.03 K/UL (ref 0–0.51)
EOSINOPHIL NFR BLD: 0.3 % (ref 0–6.9)
ERYTHROCYTE [DISTWIDTH] IN BLOOD BY AUTOMATED COUNT: 45.1 FL (ref 35.9–50)
GFR SERPLBLD CREATININE-BSD FMLA CKD-EPI: 55 ML/MIN/1.73 M 2
GLUCOSE SERPL-MCNC: 86 MG/DL (ref 65–99)
HCT VFR BLD AUTO: 42 % (ref 42–52)
HGB BLD-MCNC: 13.5 G/DL (ref 14–18)
IMM GRANULOCYTES # BLD AUTO: 0.07 K/UL (ref 0–0.11)
IMM GRANULOCYTES NFR BLD AUTO: 0.7 % (ref 0–0.9)
LYMPHOCYTES # BLD AUTO: 1.28 K/UL (ref 1–4.8)
LYMPHOCYTES NFR BLD: 12 % (ref 22–41)
MCH RBC QN AUTO: 30.3 PG (ref 27–33)
MCHC RBC AUTO-ENTMCNC: 32.1 G/DL (ref 33.7–35.3)
MCV RBC AUTO: 94.2 FL (ref 81.4–97.8)
MONOCYTES # BLD AUTO: 0.86 K/UL (ref 0–0.85)
MONOCYTES NFR BLD AUTO: 8.1 % (ref 0–13.4)
NEUTROPHILS # BLD AUTO: 8.37 K/UL (ref 1.82–7.42)
NEUTROPHILS NFR BLD: 78.7 % (ref 44–72)
NRBC # BLD AUTO: 0 K/UL
NRBC BLD-RTO: 0 /100 WBC
PLATELET # BLD AUTO: 207 K/UL (ref 164–446)
PMV BLD AUTO: 12 FL (ref 9–12.9)
POTASSIUM SERPL-SCNC: 4.4 MMOL/L (ref 3.6–5.5)
RBC # BLD AUTO: 4.46 M/UL (ref 4.7–6.1)
SODIUM SERPL-SCNC: 139 MMOL/L (ref 135–145)
WBC # BLD AUTO: 10.6 K/UL (ref 4.8–10.8)

## 2023-01-03 PROCEDURE — 85025 COMPLETE CBC W/AUTO DIFF WBC: CPT

## 2023-01-03 PROCEDURE — 700102 HCHG RX REV CODE 250 W/ 637 OVERRIDE(OP): Performed by: STUDENT IN AN ORGANIZED HEALTH CARE EDUCATION/TRAINING PROGRAM

## 2023-01-03 PROCEDURE — 94640 AIRWAY INHALATION TREATMENT: CPT

## 2023-01-03 PROCEDURE — 80048 BASIC METABOLIC PNL TOTAL CA: CPT

## 2023-01-03 PROCEDURE — 94669 MECHANICAL CHEST WALL OSCILL: CPT

## 2023-01-03 PROCEDURE — 700102 HCHG RX REV CODE 250 W/ 637 OVERRIDE(OP): Performed by: NURSE PRACTITIONER

## 2023-01-03 PROCEDURE — A9270 NON-COVERED ITEM OR SERVICE: HCPCS | Performed by: NURSE PRACTITIONER

## 2023-01-03 PROCEDURE — 36415 COLL VENOUS BLD VENIPUNCTURE: CPT

## 2023-01-03 PROCEDURE — 94664 DEMO&/EVAL PT USE INHALER: CPT

## 2023-01-03 PROCEDURE — 700101 HCHG RX REV CODE 250: Performed by: NURSE PRACTITIONER

## 2023-01-03 PROCEDURE — 700111 HCHG RX REV CODE 636 W/ 250 OVERRIDE (IP): Performed by: STUDENT IN AN ORGANIZED HEALTH CARE EDUCATION/TRAINING PROGRAM

## 2023-01-03 PROCEDURE — 770006 HCHG ROOM/CARE - MED/SURG/GYN SEMI*

## 2023-01-03 PROCEDURE — 99233 SBSQ HOSP IP/OBS HIGH 50: CPT | Performed by: NURSE PRACTITIONER

## 2023-01-03 PROCEDURE — 700111 HCHG RX REV CODE 636 W/ 250 OVERRIDE (IP): Performed by: INTERNAL MEDICINE

## 2023-01-03 PROCEDURE — A9270 NON-COVERED ITEM OR SERVICE: HCPCS | Performed by: STUDENT IN AN ORGANIZED HEALTH CARE EDUCATION/TRAINING PROGRAM

## 2023-01-03 PROCEDURE — 99406 BEHAV CHNG SMOKING 3-10 MIN: CPT

## 2023-01-03 PROCEDURE — 94760 N-INVAS EAR/PLS OXIMETRY 1: CPT

## 2023-01-03 RX ORDER — DOXYCYCLINE 100 MG/1
100 TABLET ORAL EVERY 12 HOURS
Status: DISCONTINUED | OUTPATIENT
Start: 2023-01-03 | End: 2023-01-06 | Stop reason: HOSPADM

## 2023-01-03 RX ORDER — DOXYCYCLINE 100 MG/1
100 TABLET ORAL EVERY 12 HOURS
Status: DISCONTINUED | OUTPATIENT
Start: 2023-01-03 | End: 2023-01-03

## 2023-01-03 RX ADMIN — LISINOPRIL 20 MG: 20 TABLET ORAL at 18:36

## 2023-01-03 RX ADMIN — BUDESONIDE AND FORMOTEROL FUMARATE DIHYDRATE 2 PUFF: 160; 4.5 AEROSOL RESPIRATORY (INHALATION) at 08:12

## 2023-01-03 RX ADMIN — SPIRONOLACTONE 12.5 MG: 25 TABLET ORAL at 04:48

## 2023-01-03 RX ADMIN — PREDNISONE 30 MG: 20 TABLET ORAL at 04:49

## 2023-01-03 RX ADMIN — IPRATROPIUM BROMIDE AND ALBUTEROL SULFATE 3 ML: 2.5; .5 SOLUTION RESPIRATORY (INHALATION) at 08:11

## 2023-01-03 RX ADMIN — CARVEDILOL 6.25 MG: 6.25 TABLET, FILM COATED ORAL at 18:36

## 2023-01-03 RX ADMIN — HEPARIN SODIUM 5000 UNITS: 5000 INJECTION, SOLUTION INTRAVENOUS; SUBCUTANEOUS at 21:00

## 2023-01-03 RX ADMIN — ASPIRIN 81 MG: 81 TABLET, COATED ORAL at 18:36

## 2023-01-03 RX ADMIN — CEFTRIAXONE SODIUM 2000 MG: 10 INJECTION, POWDER, FOR SOLUTION INTRAVENOUS at 05:00

## 2023-01-03 RX ADMIN — DOXYCYCLINE 100 MG: 100 TABLET, FILM COATED ORAL at 21:00

## 2023-01-03 RX ADMIN — ATORVASTATIN CALCIUM 40 MG: 40 TABLET, FILM COATED ORAL at 18:36

## 2023-01-03 RX ADMIN — FUROSEMIDE 20 MG: 20 TABLET ORAL at 04:49

## 2023-01-03 RX ADMIN — DOXYCYCLINE 100 MG: 100 TABLET, FILM COATED ORAL at 08:45

## 2023-01-03 RX ADMIN — TAMSULOSIN HYDROCHLORIDE 0.4 MG: 0.4 CAPSULE ORAL at 18:36

## 2023-01-03 RX ADMIN — HEPARIN SODIUM 5000 UNITS: 5000 INJECTION, SOLUTION INTRAVENOUS; SUBCUTANEOUS at 04:48

## 2023-01-03 RX ADMIN — BUDESONIDE AND FORMOTEROL FUMARATE DIHYDRATE 2 PUFF: 160; 4.5 AEROSOL RESPIRATORY (INHALATION) at 21:01

## 2023-01-03 RX ADMIN — DOCUSATE SODIUM 50 MG AND SENNOSIDES 8.6 MG 2 TABLET: 8.6; 5 TABLET, FILM COATED ORAL at 18:36

## 2023-01-03 RX ADMIN — HEPARIN SODIUM 5000 UNITS: 5000 INJECTION, SOLUTION INTRAVENOUS; SUBCUTANEOUS at 15:05

## 2023-01-03 RX ADMIN — TIOTROPIUM BROMIDE INHALATION SPRAY 5 MCG: 3.12 SPRAY, METERED RESPIRATORY (INHALATION) at 04:47

## 2023-01-03 RX ADMIN — IPRATROPIUM BROMIDE AND ALBUTEROL SULFATE 3 ML: 2.5; .5 SOLUTION RESPIRATORY (INHALATION) at 12:47

## 2023-01-03 RX ADMIN — THIAMINE HCL TAB 100 MG 100 MG: 100 TAB at 06:00

## 2023-01-03 RX ADMIN — IPRATROPIUM BROMIDE AND ALBUTEROL SULFATE 3 ML: 2.5; .5 SOLUTION RESPIRATORY (INHALATION) at 19:46

## 2023-01-03 RX ADMIN — NICOTINE TRANSDERMAL SYSTEM 21 MG: 21 PATCH, EXTENDED RELEASE TRANSDERMAL at 04:49

## 2023-01-03 RX ADMIN — FOLIC ACID 1 MG: 1 TABLET ORAL at 05:52

## 2023-01-03 RX ADMIN — CARVEDILOL 6.25 MG: 6.25 TABLET, FILM COATED ORAL at 08:46

## 2023-01-03 RX ADMIN — LISINOPRIL 20 MG: 20 TABLET ORAL at 04:49

## 2023-01-03 ASSESSMENT — ENCOUNTER SYMPTOMS
BLURRED VISION: 0
DEPRESSION: 0
ABDOMINAL PAIN: 0
DOUBLE VISION: 0
WEAKNESS: 1
HEARTBURN: 0
SHORTNESS OF BREATH: 1
BACK PAIN: 0
CHILLS: 0
PALPITATIONS: 0
SPUTUM PRODUCTION: 1
COUGH: 1
FEVER: 0
DIZZINESS: 0
WEIGHT LOSS: 1
NERVOUS/ANXIOUS: 0

## 2023-01-03 NOTE — FACE TO FACE
Face to Face Supporting Documentation - Home Health    The encounter with this patient was in whole or in part the primary reason for home health admission.    Date of encounter:   Patient:                    MRN:                       YOB: 2023  Laron Doherty  [id number]  1940     Home health to see patient for:  Skilled Nursing care for assessment, interventions & education    Skilled need for:  Exacerbation of Chronic Disease State COPD, urinary retention    Skilled nursing interventions to include:  Line/Drain/Airway education and care    Homebound status evidenced by:  Need the aid of supportive devices such as crutches, canes, wheelchairs or walkers. Leaving home requires a considerable and taxing effort. There is a normal inability to leave the home.    Community Physician to provide follow up care: Deangelo Hayden M.D.     Optional Interventions? No      I certify the face to face encounter for this home health care referral meets the CMS requirements and the encounter/clinical assessment with the patient was, in whole, or in part, for the medical condition(s) listed above, which is the primary reason for home health care. Based on my clinical findings: the service(s) are medically necessary, support the need for home health care, and the homebound criteria are met.  I certify that this patient has had a face to face encounter by myself.  Echo Hutchinson, [email or web address] - NPI: [id number]

## 2023-01-03 NOTE — PROGRESS NOTES
Hospital Medicine Daily Progress Note    Date of Service  1/3/2023    Chief Complaint  Laron Doherty is a 82 y.o. male admitted 12/31/2022 with shortness of breath    Hospital Course  82 y.o. male who presented 12/31/2022 with shortness of breath.  Mr. 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  States after his discharge is symptoms improved apparently until 12/31/2022 paramedics were called and found his oxygen saturations to be 68% on his usual 4 L.  Was found to have a COPD exacerbation is admitted to the intensive care unit for IV Solu-Medrol, bronchodilators, and rescue BiPAP.  His viral panel was negative.  Upon admission he had made it clear that he has a known DO NOT RESUSCITATE DO NOT INTUBATE status.  In the emergency room he is found to have a positive urinalysis and was started on IV Rocephin as he self catheterizes about 4 times per day at home.    Patient transitioned off ICU on 1/2/23.     Interval Problem Update  1/3/2023: Patient seen and examined.  He reports feeling about the same, unable to lay flat secondary to coughing spells reports that he needs to sleep in a recliner at home.  Also endorses significant weight loss over the past several months.    -Vital signs reviewed, afebrile and hemodynamically stable on baseline 4 L nasal cannula  -Labs reviewed, hemoglobin 13.5, blood glucose 126  -Culture positive for staph epidermidis resistant to ceftriaxone, discussed with pharmacy and transition to doxycycline for better coverage  -Orders placed for home health and home nebulizer    I have discussed this patient's plan of care and discharge plan at IDT rounds today with Case Management, Nursing, Nursing leadership, and other members of the IDT  team.    Consultants/Specialty  pulmonary    Code Status  DNAR/DNI    Disposition  Patient is not medically cleared for discharge.   Anticipate discharge to to home with close outpatient follow-up.  I have placed the appropriate orders for post-discharge needs.    Review of Systems  Review of Systems   Constitutional:  Positive for malaise/fatigue and weight loss. Negative for chills and fever.   HENT:  Negative for ear pain and hearing loss.    Eyes:  Negative for blurred vision and double vision.   Respiratory:  Positive for cough, sputum production and shortness of breath.    Cardiovascular:  Negative for chest pain, palpitations and leg swelling.   Gastrointestinal:  Negative for abdominal pain and heartburn.   Genitourinary:  Negative for dysuria.   Musculoskeletal:  Negative for back pain.   Neurological:  Positive for weakness. Negative for dizziness.   Psychiatric/Behavioral:  Negative for depression. The patient is not nervous/anxious.    All other systems reviewed and are negative.     Physical Exam  Temp:  [36.3 °C (97.4 °F)-36.6 °C (97.8 °F)] 36.4 °C (97.5 °F)  Pulse:  [60-83] 71  Resp:  [16-18] 16  BP: (125-163)/(70-96) 134/73  SpO2:  [91 %-99 %] 96 %    Physical Exam  Vitals and nursing note reviewed.   Constitutional:       Appearance: He is ill-appearing.      Comments: Cachectic   HENT:      Head: Normocephalic and atraumatic.      Right Ear: External ear normal.      Left Ear: External ear normal.      Nose: Nose normal.      Mouth/Throat:      Mouth: Mucous membranes are moist.      Pharynx: Oropharynx is clear.   Eyes:      General: No scleral icterus.  Cardiovascular:      Rate and Rhythm: Normal rate and regular rhythm.      Pulses: Normal pulses.      Heart sounds: Normal heart sounds.   Pulmonary:      Effort: Pulmonary effort is normal.      Comments: Diminished breath sounds throughout  Abdominal:      General: Abdomen is flat. Bowel sounds are normal.      Palpations: Abdomen is soft.       Tenderness: There is no abdominal tenderness.   Genitourinary:     Comments: Melara catheter in place  Musculoskeletal:         General: No swelling. Normal range of motion.      Cervical back: Normal range of motion.   Skin:     General: Skin is warm and dry.      Capillary Refill: Capillary refill takes less than 2 seconds.      Coloration: Skin is pale.   Neurological:      Mental Status: He is alert and oriented to person, place, and time.      Motor: Weakness present.   Psychiatric:         Mood and Affect: Mood normal.         Behavior: Behavior normal.       Fluids    Intake/Output Summary (Last 24 hours) at 1/3/2023 0755  Last data filed at 1/3/2023 0600  Gross per 24 hour   Intake 1310 ml   Output 1850 ml   Net -540 ml       Laboratory  Recent Labs     01/01/23  0535   WBC 12.0*   RBC 4.08*   HEMOGLOBIN 12.5*   HEMATOCRIT 38.5*   MCV 94.4   MCH 30.6   MCHC 32.5*   RDW 45.0   PLATELETCT 196   MPV 11.8     Recent Labs     01/01/23  0535   SODIUM 140   POTASSIUM 4.0   CHLORIDE 104   CO2 22   GLUCOSE 131*   BUN 37*   CREATININE 1.51*   CALCIUM 8.9                   Imaging  DX-CHEST-PORTABLE (1 VIEW)   Final Result      Again seen diffuse bilateral interstitial infiltrates which may represent chronic interstitial lung disease versus interstitial edema or atypical infection.           Assessment/Plan  * Acute on chronic respiratory failure with hypoxia and hypercapnia (HCC)- (present on admission)  Assessment & Plan  He is on 4 L nasal cannula oxygen is his baseline and had acute hypoxia with hypercapnia requiring ICU admission with rescue BiPAP    Severe protein-calorie malnutrition (HCC)- (present on admission)  Assessment & Plan  There is a clinical diagnosis present upon admission in setting of severe cachexia in the secondary to advanced COPD.  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 (HCC)- (present on  admission)  Assessment & Plan  Echocardiogram reveals an ejection fraction of 20%  Goal-directed therapy with Aldactone, lisinopril, Coreg, and Lasix      Urinary tract infection- (present on admission)  Assessment & Plan  This is a complicated urinary tract infection given his self-catheterization  He has been initiated on IV Rocephin  Urine culture grew staph epidermidis  Antibiotics de-escalated to doxycycline     Acute exacerbation of chronic obstructive pulmonary disease (COPD) ([facility])- (present on admission)  Assessment & Plan  Treated with bronchodilators, rescue BiPAP, IV Solu-Medrol and ICU admission  Smoking cessation discussed  Pulmonary consultation on 1/2/2023    Patient does report improvement however continues to have coughing fits and episodes of hypoxia at night  He is anxious being discharged and needing to contact EMS, working with case management to see if patient can qualify for a nebulizer for home  Nebulizer and home health orders placed    Chronic kidney disease, stage III (moderate) ([facility])- (present on admission)  Assessment & Plan  Baseline is unknown   His Cr has been between 1.5 and 1.7    Tobacco abuse- (present on admission)  Assessment & Plan  Cessation discussed      Benign prostatic hyperplasia with urinary retention- (present on admission)  Assessment & Plan  He undergoes self catheterization at home  A mayorga was placed in the ICU   Patient uncomfortable with the straight cath supplies we have in the hospital, will plan to continue Mayorga catheter on day of discharge and patient can continue to straight cath at home  Home health orders placed         VTE prophylaxis: heparin ppx    I have performed a physical exam and reviewed and updated ROS and Plan today (1/3/2023). In review of yesterday's note (1/2/2023), there are no changes except as documented above.

## 2023-01-03 NOTE — RESPIRATORY CARE
COPD EDUCATION by COPD CLINICAL EDUCATOR  1/3/2023  at  3:30 PM by Sanam Walker RRT     Patient interviewed by COPD education team.  Patient declined to participate in full program.  A short intervention has been conducted.  A comprehensive packet including information about COPD, types of treatments to manage their disease and safe home Oxygen usage was provided and reviewed with patient at the bedside.       Smoking Cessation Intervention and education completed, 5 minutes spent on smoking cessation education with patient.  Patient declined literature about quitting smoking, states he is not interested in quitting though he does see the benefits in reducing the amount he smokes.     Patient does not have a smart phone to participate in RPM.     Requested orders for a home nebulizer and nebulizer medications for rescue, as well as a referral for a home health nurse for medication management.     COPD Screen  COPD Risk Screening  Do you have a history of COPD?: Yes (pt denies but takes inhalers)  Do you have a Pulmonologist?: No  COPD Population Screener  During the past 4 weeks, how much did you feel short of breath?: Some of the time  Do you ever cough up any mucus or phlegm?: Yes, a few days a week or month  In the past 12 months, you do less than you used to because of your breathing problems: Disagree/unsure  Have you smoked at least 100 cigarettes in your entire life?: Yes  How old are you?: 60+  COPD Screening Score: 6  COPD Coordinator Recommended: Yes    COPD Assessment  COPD Clinical Specialists ONLY  COPD Education Initiated: Yes--Short Intervention  DME Company: [employer] [facility]  DME Equipment Type: O2 @ 4 L, requested home nebulizer  [facility]: [facility]  [facility]ulmonologist [facility]: [facility]  Referrals Initiated: Yes  Pulmonary Rehab: Yes  Smoking Cessation: Yes  $ Smoking Cessation 3-10 Minutes: Symptomatic (5 min)  Palliative Care:  (not ordered, never seen)  Hospice: N/A  Home Health Care: Yes (requested)  [facility]ARIS  "Community Outreach: N/A  [facility]iatric Specialty Group: N/A  [facility]  Private In-Home Care Agency: N/A  Is this a COPD exacerbation patient?: Yes (per H&P, order set used, [employer] attending)  $ Demo/Eval of SVN's, MDI's and Aerosols: Yes    PFT Results    No results found for: PFT    Meds to Beds  Would the patient like to opt in for Bedside Medication Delivery at Discharge?: Yes, interested     MY COPD ACTION PLAN     It is recommended that patients and physicians /healthcare providers complete this action plan together. This plan should be discussed at each physician visit and updated as needed.    The green, yellow and red zones show groups of symptoms of COPD. This list of symptoms is not comprehensive, and you may experience other symptoms. In the \"Actions\" column, your healthcare provider has recommended actions for you to take based on your symptoms.    Patient Name: Laron Doherty   YOB: 1940   Last Updated on: 4/5/2022 10:13 AM   Green Zone:  I am doing well today Actions     Usual activitiy and exercise level   Take daily medications     Usual amounts of cough and phlegm/mucus   Use oxygen as prescribed     Sleep well at night   Continue regular exercise/diet plan     Appetite is good   At all times avoid cigarette smoke, inhaled irritants     Daily Medications (these medications are taken every day):   Tiotropium Bromide Monohydrate (Spiriva)  Fluticasone and Salmeterol (Wixela) 2 Puffs  1 Puff Once daily  Twice daily     Additional Information:    Wixela- rinse your mouth after taking.    Yellow Zone:  I am having a bad day or a COPD flare Actions     More breathless than usual   Continue daily medications     I have less energy for my daily activities   Use quick relief inhaler as ordered     Increased or thicker phlegm/mucus   Use oxygen as prescribed     Using quick relief inhaler/nebulizer more often   Get plenty of rest     Swelling of ankles more than usual   Use pursed lip " "breathing     More coughing than usual   At all times avoid cigarette smoke, inhaled irritants     I feel like I have a \"chest cold\"     Poor sleep and my symptoms woke me up     My appetite is not good     My medicine is not helping      Call provider immediately if symptoms don’t improve     Continue daily medications, add rescue medications:   Albuterol 2 Puffs Every 6 hours PRN       Medications to be used during a flare up, (as Discussed with Provider):           Additional Information:  Use with the spacer.    Red Zone:  I need urgent medical care Actions     Severe shortness of breath even at rest   Call 911 or seek medical care immediately     Not able to do any activity because of breathing      Fever or shaking chills      Feeling confused or very drowsy       Chest pains      Coughing up blood                  "

## 2023-01-03 NOTE — CARE PLAN
The patient is Stable - Low risk of patient condition declining or worsening    Shift Goals  Clinical Goals: mobilize  Patient Goals: football on tv, pooping  Family Goals: FARRAH    Progress made toward(s) clinical / shift goals:    Problem: Knowledge Deficit - COPD  Goal: Patient/significant other demonstrates understanding of disease process, utilization of the Action Plan, medications and discharge instruction  Outcome: Progressing     Problem: Self Care  Goal: Patient will have the ability to perform ADLs independently or with assistance (bathe, groom, dress, toilet and feed)  Outcome: Progressing     Problem: Fall Risk  Goal: Patient will remain free from falls  Outcome: Progressing     Problem: Skin Integrity  Goal: Skin integrity is maintained or improved  Outcome: Progressing

## 2023-01-04 PROBLEM — R19.7 DIARRHEA: Status: ACTIVE | Noted: 2023-01-04

## 2023-01-04 LAB
ANION GAP SERPL CALC-SCNC: 10 MMOL/L (ref 7–16)
BUN SERPL-MCNC: 46 MG/DL (ref 8–22)
CALCIUM SERPL-MCNC: 9 MG/DL (ref 8.5–10.5)
CHLORIDE SERPL-SCNC: 105 MMOL/L (ref 96–112)
CO2 SERPL-SCNC: 23 MMOL/L (ref 20–33)
CREAT SERPL-MCNC: 1.37 MG/DL (ref 0.5–1.4)
ERYTHROCYTE [DISTWIDTH] IN BLOOD BY AUTOMATED COUNT: 44.6 FL (ref 35.9–50)
GFR SERPLBLD CREATININE-BSD FMLA CKD-EPI: 51 ML/MIN/1.73 M 2
GLUCOSE SERPL-MCNC: 88 MG/DL (ref 65–99)
HCT VFR BLD AUTO: 42 % (ref 42–52)
HGB BLD-MCNC: 13.9 G/DL (ref 14–18)
MAGNESIUM SERPL-MCNC: 2.3 MG/DL (ref 1.5–2.5)
MCH RBC QN AUTO: 31.1 PG (ref 27–33)
MCHC RBC AUTO-ENTMCNC: 33.1 G/DL (ref 33.7–35.3)
MCV RBC AUTO: 94 FL (ref 81.4–97.8)
PHOSPHATE SERPL-MCNC: 3.6 MG/DL (ref 2.5–4.5)
PLATELET # BLD AUTO: 204 K/UL (ref 164–446)
PMV BLD AUTO: 11.5 FL (ref 9–12.9)
POTASSIUM SERPL-SCNC: 4.6 MMOL/L (ref 3.6–5.5)
RBC # BLD AUTO: 4.47 M/UL (ref 4.7–6.1)
SODIUM SERPL-SCNC: 138 MMOL/L (ref 135–145)
WBC # BLD AUTO: 10.3 K/UL (ref 4.8–10.8)

## 2023-01-04 PROCEDURE — 94669 MECHANICAL CHEST WALL OSCILL: CPT

## 2023-01-04 PROCEDURE — 700102 HCHG RX REV CODE 250 W/ 637 OVERRIDE(OP): Performed by: STUDENT IN AN ORGANIZED HEALTH CARE EDUCATION/TRAINING PROGRAM

## 2023-01-04 PROCEDURE — 85027 COMPLETE CBC AUTOMATED: CPT

## 2023-01-04 PROCEDURE — 80048 BASIC METABOLIC PNL TOTAL CA: CPT

## 2023-01-04 PROCEDURE — 99233 SBSQ HOSP IP/OBS HIGH 50: CPT | Performed by: NURSE PRACTITIONER

## 2023-01-04 PROCEDURE — 94640 AIRWAY INHALATION TREATMENT: CPT

## 2023-01-04 PROCEDURE — 36415 COLL VENOUS BLD VENIPUNCTURE: CPT

## 2023-01-04 PROCEDURE — 770001 HCHG ROOM/CARE - MED/SURG/GYN PRIV*

## 2023-01-04 PROCEDURE — 700101 HCHG RX REV CODE 250: Performed by: NURSE PRACTITIONER

## 2023-01-04 PROCEDURE — 700102 HCHG RX REV CODE 250 W/ 637 OVERRIDE(OP): Performed by: NURSE PRACTITIONER

## 2023-01-04 PROCEDURE — 84100 ASSAY OF PHOSPHORUS: CPT

## 2023-01-04 PROCEDURE — A9270 NON-COVERED ITEM OR SERVICE: HCPCS | Performed by: NURSE PRACTITIONER

## 2023-01-04 PROCEDURE — 700101 HCHG RX REV CODE 250: Performed by: STUDENT IN AN ORGANIZED HEALTH CARE EDUCATION/TRAINING PROGRAM

## 2023-01-04 PROCEDURE — A9270 NON-COVERED ITEM OR SERVICE: HCPCS | Performed by: STUDENT IN AN ORGANIZED HEALTH CARE EDUCATION/TRAINING PROGRAM

## 2023-01-04 PROCEDURE — 700111 HCHG RX REV CODE 636 W/ 250 OVERRIDE (IP): Performed by: STUDENT IN AN ORGANIZED HEALTH CARE EDUCATION/TRAINING PROGRAM

## 2023-01-04 PROCEDURE — 700111 HCHG RX REV CODE 636 W/ 250 OVERRIDE (IP): Performed by: INTERNAL MEDICINE

## 2023-01-04 PROCEDURE — 83735 ASSAY OF MAGNESIUM: CPT

## 2023-01-04 PROCEDURE — 700105 HCHG RX REV CODE 258: Performed by: NURSE PRACTITIONER

## 2023-01-04 PROCEDURE — 94760 N-INVAS EAR/PLS OXIMETRY 1: CPT

## 2023-01-04 RX ORDER — HYDROXYZINE 50 MG/1
25 TABLET, FILM COATED ORAL 3 TIMES DAILY PRN
Status: DISCONTINUED | OUTPATIENT
Start: 2023-01-04 | End: 2023-01-06 | Stop reason: HOSPADM

## 2023-01-04 RX ORDER — SODIUM CHLORIDE, SODIUM LACTATE, POTASSIUM CHLORIDE, AND CALCIUM CHLORIDE .6; .31; .03; .02 G/100ML; G/100ML; G/100ML; G/100ML
500 INJECTION, SOLUTION INTRAVENOUS ONCE
Status: COMPLETED | OUTPATIENT
Start: 2023-01-04 | End: 2023-01-04

## 2023-01-04 RX ADMIN — DOXYCYCLINE 100 MG: 100 TABLET, FILM COATED ORAL at 06:05

## 2023-01-04 RX ADMIN — TAMSULOSIN HYDROCHLORIDE 0.4 MG: 0.4 CAPSULE ORAL at 17:34

## 2023-01-04 RX ADMIN — SPIRONOLACTONE 12.5 MG: 25 TABLET ORAL at 06:06

## 2023-01-04 RX ADMIN — HEPARIN SODIUM 5000 UNITS: 5000 INJECTION, SOLUTION INTRAVENOUS; SUBCUTANEOUS at 06:06

## 2023-01-04 RX ADMIN — THIAMINE HCL TAB 100 MG 100 MG: 100 TAB at 06:05

## 2023-01-04 RX ADMIN — NICOTINE TRANSDERMAL SYSTEM 21 MG: 21 PATCH, EXTENDED RELEASE TRANSDERMAL at 06:06

## 2023-01-04 RX ADMIN — DOXYCYCLINE 100 MG: 100 TABLET, FILM COATED ORAL at 17:34

## 2023-01-04 RX ADMIN — CARVEDILOL 6.25 MG: 6.25 TABLET, FILM COATED ORAL at 17:34

## 2023-01-04 RX ADMIN — LISINOPRIL 20 MG: 20 TABLET ORAL at 06:05

## 2023-01-04 RX ADMIN — TIOTROPIUM BROMIDE INHALATION SPRAY 5 MCG: 3.12 SPRAY, METERED RESPIRATORY (INHALATION) at 06:07

## 2023-01-04 RX ADMIN — ASPIRIN 81 MG: 81 TABLET, COATED ORAL at 17:34

## 2023-01-04 RX ADMIN — FUROSEMIDE 20 MG: 20 TABLET ORAL at 06:05

## 2023-01-04 RX ADMIN — CARVEDILOL 6.25 MG: 6.25 TABLET, FILM COATED ORAL at 07:12

## 2023-01-04 RX ADMIN — IPRATROPIUM BROMIDE AND ALBUTEROL SULFATE 3 ML: .5; 2.5 SOLUTION RESPIRATORY (INHALATION) at 01:14

## 2023-01-04 RX ADMIN — DOCUSATE SODIUM 50 MG AND SENNOSIDES 8.6 MG 2 TABLET: 8.6; 5 TABLET, FILM COATED ORAL at 06:05

## 2023-01-04 RX ADMIN — BUDESONIDE AND FORMOTEROL FUMARATE DIHYDRATE 2 PUFF: 160; 4.5 AEROSOL RESPIRATORY (INHALATION) at 06:07

## 2023-01-04 RX ADMIN — FOLIC ACID 1 MG: 1 TABLET ORAL at 06:05

## 2023-01-04 RX ADMIN — BUDESONIDE AND FORMOTEROL FUMARATE DIHYDRATE 2 PUFF: 160; 4.5 AEROSOL RESPIRATORY (INHALATION) at 18:00

## 2023-01-04 RX ADMIN — ATORVASTATIN CALCIUM 40 MG: 40 TABLET, FILM COATED ORAL at 17:34

## 2023-01-04 RX ADMIN — IPRATROPIUM BROMIDE AND ALBUTEROL SULFATE 3 ML: 2.5; .5 SOLUTION RESPIRATORY (INHALATION) at 07:28

## 2023-01-04 RX ADMIN — LISINOPRIL 20 MG: 20 TABLET ORAL at 17:34

## 2023-01-04 RX ADMIN — IPRATROPIUM BROMIDE AND ALBUTEROL SULFATE 3 ML: 2.5; .5 SOLUTION RESPIRATORY (INHALATION) at 10:35

## 2023-01-04 RX ADMIN — HEPARIN SODIUM 5000 UNITS: 5000 INJECTION, SOLUTION INTRAVENOUS; SUBCUTANEOUS at 15:41

## 2023-01-04 RX ADMIN — PREDNISONE 30 MG: 20 TABLET ORAL at 06:05

## 2023-01-04 RX ADMIN — SODIUM CHLORIDE, POTASSIUM CHLORIDE, SODIUM LACTATE AND CALCIUM CHLORIDE 500 ML: 600; 310; 30; 20 INJECTION, SOLUTION INTRAVENOUS at 15:51

## 2023-01-04 RX ADMIN — HEPARIN SODIUM 5000 UNITS: 5000 INJECTION, SOLUTION INTRAVENOUS; SUBCUTANEOUS at 21:41

## 2023-01-04 ASSESSMENT — ENCOUNTER SYMPTOMS
FEVER: 0
BLURRED VISION: 0
NERVOUS/ANXIOUS: 0
WEIGHT LOSS: 1
HEARTBURN: 0
CHILLS: 0
ABDOMINAL PAIN: 0
COUGH: 1
DOUBLE VISION: 0
WEAKNESS: 1
PALPITATIONS: 0
BACK PAIN: 0
SPUTUM PRODUCTION: 1
DIARRHEA: 1
SHORTNESS OF BREATH: 1
DEPRESSION: 0
DIZZINESS: 0

## 2023-01-04 ASSESSMENT — PATIENT HEALTH QUESTIONNAIRE - PHQ9
1. LITTLE INTEREST OR PLEASURE IN DOING THINGS: NOT AT ALL
SUM OF ALL RESPONSES TO PHQ9 QUESTIONS 1 AND 2: 0
2. FEELING DOWN, DEPRESSED, IRRITABLE, OR HOPELESS: NOT AT ALL

## 2023-01-04 NOTE — DISCHARGE PLANNING
Agency/Facility Name: [facility]  Spoke To: Ezio  Outcome: DME order was not received.  DPA re-faxed the referral and faxed the DME Nebulizer order via right fax.

## 2023-01-04 NOTE — DISCHARGE INSTR - CASE MGT
[facility] number for [facility]: [phone].      called 1/4/2023 and left message asking pt advocate to call to cell phone [phone]

## 2023-01-04 NOTE — PROGRESS NOTES
Hospital Medicine Daily Progress Note    Date of Service  1/4/2023    Chief Complaint  Laron Doherty is a 82 y.o. male admitted 12/31/2022 with shortness of breath    Hospital Course  82 y.o. male who presented 12/31/2022 with shortness of breath.  Mr. 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  States after his discharge is symptoms improved apparently until 12/31/2022 paramedics were called and found his oxygen saturations to be 68% on his usual 4 L.  Was found to have a COPD exacerbation is admitted to the intensive care unit for IV Solu-Medrol, bronchodilators, and rescue BiPAP.  His viral panel was negative.  Upon admission he had made it clear that he has a known DO NOT RESUSCITATE DO NOT INTUBATE status.  In the emergency room he is found to have a positive urinalysis and was started on IV Rocephin as he self catheterizes about 4 times per day at home.    Patient transitioned off ICU on 1/2/23.     Interval Problem Update  1/4/2023: Patient seen and examined.  He states he does not feel very well and is having diarrhea.    -Vital signs reviewed, afebrile and hemodynamically stable on baseline 4 L nasal cannula  -Labs reviewed, hemoglobin 13.9, BUN evaded, will give small fluid bolus  -C. Difficile pending  -Continue doxycycline  -Orders placed for home health and home nebulizer    I have discussed this patient's plan of care and discharge plan at IDT rounds today with Case Management, Nursing, Nursing leadership, and other members of the IDT team.    Consultants/Specialty  pulmonary    Code Status  DNAR/DNI    Disposition  Patient is not medically cleared for discharge.   Anticipate discharge to to home with close outpatient follow-up.  I have placed the appropriate orders for post-discharge  needs.    Review of Systems  Review of Systems   Constitutional:  Positive for malaise/fatigue and weight loss. Negative for chills and fever.   HENT:  Negative for ear pain and hearing loss.    Eyes:  Negative for blurred vision and double vision.   Respiratory:  Positive for cough, sputum production and shortness of breath.    Cardiovascular:  Negative for chest pain, palpitations and leg swelling.   Gastrointestinal:  Positive for diarrhea. Negative for abdominal pain and heartburn.   Genitourinary:  Negative for dysuria.   Musculoskeletal:  Negative for back pain.   Neurological:  Positive for weakness. Negative for dizziness.   Psychiatric/Behavioral:  Negative for depression. The patient is not nervous/anxious.    All other systems reviewed and are negative.     Physical Exam  Temp:  [36.2 °C (97.1 °F)-36.4 °C (97.6 °F)] 36.2 °C (97.1 °F)  Pulse:  [63-72] 71  Resp:  [16-18] 16  BP: (107-138)/(62-98) 127/98  SpO2:  [94 %-100 %] 94 %    Physical Exam  Vitals and nursing note reviewed.   Constitutional:       Appearance: He is ill-appearing.      Comments: Cachectic   HENT:      Head: Normocephalic and atraumatic.      Right Ear: External ear normal.      Left Ear: External ear normal.      Nose: Nose normal.      Mouth/Throat:      Mouth: Mucous membranes are moist.      Pharynx: Oropharynx is clear.   Eyes:      General: No scleral icterus.  Cardiovascular:      Rate and Rhythm: Normal rate and regular rhythm.      Pulses: Normal pulses.      Heart sounds: Normal heart sounds.   Pulmonary:      Effort: Pulmonary effort is normal.      Comments: Diminished breath sounds throughout  Abdominal:      General: Abdomen is flat. Bowel sounds are normal.      Palpations: Abdomen is soft.      Tenderness: There is no abdominal tenderness.   Genitourinary:     Comments: Melara catheter in place  Musculoskeletal:         General: No swelling. Normal range of motion.      Cervical back: Normal range of motion.   Skin:      General: Skin is warm and dry.      Capillary Refill: Capillary refill takes less than 2 seconds.      Coloration: Skin is pale.   Neurological:      Mental Status: He is alert and oriented to person, place, and time.      Motor: Weakness present.   Psychiatric:         Mood and Affect: Mood normal.         Behavior: Behavior normal.       Fluids    Intake/Output Summary (Last 24 hours) at 1/4/2023 1435  Last data filed at 1/4/2023 0850  Gross per 24 hour   Intake 240 ml   Output 1500 ml   Net -1260 ml         Laboratory  Recent Labs     01/03/23  0702 01/04/23  0251   WBC 10.6 10.3   RBC 4.46* 4.47*   HEMOGLOBIN 13.5* 13.9*   HEMATOCRIT 42.0 42.0   MCV 94.2 94.0   MCH 30.3 31.1   MCHC 32.1* 33.1*   RDW 45.1 44.6   PLATELETCT 207 204   MPV 12.0 11.5       Recent Labs     01/03/23  0702 01/04/23  0251   SODIUM 139 138   POTASSIUM 4.4 4.6   CHLORIDE 104 105   CO2 25 23   GLUCOSE 86 88   BUN 40* 46*   CREATININE 1.29 1.37   CALCIUM 9.0 9.0                     Imaging  DX-CHEST-PORTABLE (1 VIEW)   Final Result      Again seen diffuse bilateral interstitial infiltrates which may represent chronic interstitial lung disease versus interstitial edema or atypical infection.             Assessment/Plan  * Acute on chronic respiratory failure with hypoxia and hypercapnia (HCC)- (present on admission)  Assessment & Plan  He is on 4 L nasal cannula oxygen is his baseline and had acute hypoxia with hypercapnia requiring ICU admission with rescue BiPAP    Diarrhea  Assessment & Plan  At risk for C. difficile given recent antibiotic therapy  C. difficile ordered and pending    Severe protein-calorie malnutrition (HCC)- (present on admission)  Assessment & Plan  There is a clinical diagnosis present upon admission in setting of severe cachexia in the secondary to advanced COPD.  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 (HCC)- (present on  admission)  Assessment & Plan  Echocardiogram reveals an ejection fraction of 20%  Goal-directed therapy with Aldactone, lisinopril, Coreg, and Lasix      Urinary tract infection- (present on admission)  Assessment & Plan  This is a complicated urinary tract infection given his self-catheterization  He has been initiated on IV Rocephin  Urine culture grew staph epidermidis  Antibiotics de-escalated to doxycycline     Acute exacerbation of chronic obstructive pulmonary disease (COPD) ([facility])- (present on admission)  Assessment & Plan  Treated with bronchodilators, rescue BiPAP, IV Solu-Medrol and ICU admission  Smoking cessation discussed  Pulmonary consultation on 1/2/2023    Patient does report improvement however continues to have coughing fits and episodes of hypoxia at night  He is anxious being discharged and needing to contact EMS, working with case management to see if patient can qualify for a nebulizer for home  Nebulizer and home health orders placed    Chronic kidney disease, stage III (moderate) ([facility])- (present on admission)  Assessment & Plan  Baseline is unknown   His Cr has been between 1.5 and 1.7    Tobacco abuse- (present on admission)  Assessment & Plan  Cessation discussed    Benign prostatic hyperplasia with urinary retention- (present on admission)  Assessment & Plan  He undergoes self catheterization at home  A mayorga was placed in the ICU   Patient uncomfortable with the straight cath supplies we have in the hospital, will plan to continue Mayorga catheter on day of discharge and patient can continue to straight cath at home  Home health orders placed         VTE prophylaxis: heparin ppx    I have performed a physical exam and reviewed and updated ROS and Plan today (1/4/2023). In review of yesterday's note (1/3/2023), there are no changes except as documented above.

## 2023-01-04 NOTE — DISCHARGE PLANNING
note:  Chrissy called [facility] stating that they will deliver the nebulizer to pt today. They will deliver to hospital room on Diana 6. ETA 2 Hrs.

## 2023-01-04 NOTE — DISCHARGE PLANNING
Met with pt who said that he is normally independent with Adls and Iadls. He does not use any mobility devices. Walks 2 miles to his busstop almost everyday. 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 He rides the bus to see her often and takes her to places. They  a couple decades ago but remain friends. He was reluctant to have homehealth but explained that if he does not feel he needs them later on they can stop so he is agreeable with #1 [facility] #2 [facility] #[facility]. He was also agreeable with the nebulizer order and #1 [facility] #2 [facility]. He wants his Medicare to pay for these services.     [facility]    His income is $1800 a month so explained that over income for Medicaid.     Care Transition Team Assessment    Information Source  Orientation Level: Oriented X4  Information Given By: Patient  Who is responsible for making decisions for patient? : Patient    Readmission Evaluation  Is this a readmission?: No    Elopement Risk  Legal Hold: No  Ambulatory or Self Mobile in Wheelchair: Yes  Disoriented: No  Psychiatric Symptoms: None  History of Wandering: No  Elopement this Admit: No  Vocalizing Wanting to Leave: No  Displays Behaviors, Body Language Wanting to Leave: No-Not at Risk for Elopement  Elopement Risk: Not at Risk for Elopement    Interdisciplinary Discharge Planning  Does Admitting Nurse Feel This Could be a Complex Discharge?: No  Primary Care Physician: DELILAH COTA  Lives with - Patient's Self Care Capacity: Alone and Able to Care For Self  Patient or legal guardian wants to designate a caregiver: No  Support Systems: Other (Comments) (ex wife)  Housing / Facility: Mobile Home  Do You Take your Prescribed Medications Regularly: Yes  Able to Return to Previous ADL's: Yes  Mobility Issues: No  Prior Services: None, Home-Independent  Patient Prefers to be  Discharged to:: Home with   Assistance Needed: No  Durable Medical Equipment: Home Oxygen  DME Provider / Phone: [facility] for [facility]    Discharge Preparedness  What is your plan after discharge?: Home health care  What are your discharge supports?: Other (comment)  Prior Functional Level: Ambulatory, Independent with Activities of Daily Living, Independent with Medication Management  Difficulity with ADLs: None  Difficulity with IADLs: None    Functional Assesment  Prior Functional Level: Ambulatory, Independent with Activities of Daily Living, Independent with Medication Management    Finances  Financial Barriers to Discharge: No  Prescription Coverage: Yes    Vision / Hearing Impairment  Vision Impairment : Yes  Right Eye Vision: Impaired, Wears Glasses  Left Eye Vision: Impaired, Wears Glasses  Hearing Impairment : Yes  Hearing Impairment: Both Ears  Does Pt Need Special Equipment for the Hearing Impaired?: No    Values / Beliefs / Concerns  Values / Beliefs Concerns : No    Advance Directive  Advance Directive?: None    Domestic Abuse  Have you ever been the victim of abuse or violence?: No  Physical Abuse or Sexual Abuse: No  Verbal Abuse or Emotional Abuse: No  Possible Abuse/Neglect Reported to:: Not Applicable         Discharge Risks or Barriers  Discharge risks or barriers?: Post-acute placement / services  Patient risk factors: Lives alone and no community support    Anticipated Discharge Information  Discharge Disposition: Discharged to home/self care (01)

## 2023-01-04 NOTE — CARE PLAN
The patient is Stable - Low risk of patient condition declining or worsening    Shift Goals  Clinical Goals: maintain O2 sats, ambulate  Patient Goals: ambulate, comfort  Family Goals: pietro    Progress made toward(s) clinical / shift goals:    Problem: Knowledge Deficit - COPD  Goal: Patient/significant other demonstrates understanding of disease process, utilization of the Action Plan, medications and discharge instruction  Outcome: Progressing     Problem: Risk for Infection - COPD  Goal: Patient will remain free from signs and symptoms of infection  Outcome: Progressing     Problem: Nutrition - Advanced  Goal: Patient will display progressive weight gain toward goal have adequate food and fluid intake  Outcome: Progressing     Problem: Ineffective Airway Clearance  Goal: Patient will maintain patent airway with clear/clearing breath sounds  Outcome: Progressing     Problem: Impaired Gas Exchange  Goal: Patient will demonstrate improved ventilation and adequate oxygenation and participate in treatment regimen within the level of ability/situation.  Outcome: Progressing     Problem: Risk for Aspiration  Goal: Patient's risk for aspiration will be absent or decrease  Outcome: Progressing     Problem: Self Care  Goal: Patient will have the ability to perform ADLs independently or with assistance (bathe, groom, dress, toilet and feed)  Outcome: Progressing     Problem: Knowledge Deficit - Standard  Goal: Patient and family/care givers will demonstrate understanding of plan of care, disease process/condition, diagnostic tests and medications  Outcome: Progressing     Problem: Fall Risk  Goal: Patient will remain free from falls  Outcome: Progressing     Problem: Skin Integrity  Goal: Skin integrity is maintained or improved  Outcome: Progressing

## 2023-01-04 NOTE — DISCHARGE PLANNING
Case Management Discharge Planning    Admission Date: 12/31/2022  GMLOS: 3.4  ALOS: 4    6-Clicks ADL Score: 24  6-Clicks Mobility Score: 18      Anticipated Discharge Dispo: Discharge Disposition: Discharged to home/self care (01)    DME Needed: No    Action(s) Taken: OTHER, CM RN called [facility] to follow up with pts Nebulizer. No answer, VM left for call back. CM RN called [facility] at [phone] to inquire about assistance when OP. CM RN spoke to Gayle who gave CM RN [facility] advocate number for [facility], [phone].     CM RN called pt advocate and left message to contact pt or CM RN.     1309: CM RN met with pt, Nebulizer at bedside. CM RN discussed with pt concern for heater not working. Per pt he does have a space heater and has trouble working is main heater in his double wide. CM RN has no issues with discharging, only concern is to not transport home on the freeway. Pt given cab voucher with note to not take the freeway. Pt informed should receive a call from [facility] advocate on home phone. Cab voucher given to Bedside RN.     Escalations Completed: DME Company    Medically Clear: Yes    Next Steps: CM RN to follow up with Neb and rise home    Barriers to Discharge: DME and Transportation    Is the patient up for discharge tomorrow: No

## 2023-01-04 NOTE — CARE PLAN
The patient is Stable - Low risk of patient condition declining or worsening    Shift Goals  Clinical Goals: mobilize  Patient Goals: football on tv, pooping  Family Goals: FARRAH    Progress made toward(s) clinical / shift goals:  Pt understands and agrees to plan of care. Wean O2 as tolerated.     Problem: Knowledge Deficit - COPD  Goal: Patient/significant other demonstrates understanding of disease process, utilization of the Action Plan, medications and discharge instruction  Description: Target End Date:  1-3 days or as soon as patient condition allows    Document in Patient Education    1.  Discuss the importance of medical follow-up care, periodic chest x-rays, sputum cultures  2.  Review worsening signs and symptoms of COPD flare and exacerbation and its management  3.  Discuss respiratory medications, side effects, adverse reactions  4.  Demonstrate technique for using a metered-dose inhaler (MDI) and utilization of a spacer  5.  Review the COPD Action Plan  6.  Instruct and reinforce the rationale for breathing exercises, coughing effectively, and general conditioning exercises  7.  Stress importance of oral care and dental hygiene  8.  Discuss the importance of avoiding people with active respiratory infections and need for routine influenza and pneumococcal vaccinations  9.  Discuss factors that may trigger condition and encourage patient/significant other to explore ways to control these factors in and around the home and work setting  10. Review the harmful effects of smoking and advise cessation of smoking  11. Provide information about activity limitations and alternating activities with rest periods to prevent fatigue  12. Instruct asthmatic patient of use of peak flow meter, as appropriate  13. Review oxygen requirements and dosage, safe use of oxygen, and refer to the supplier as indicated  14. Educate patient/family/caregiver on the use of Nasal Intermittent Positive Pressure Ventilation (NIPPV)  and possible adverse reactions  Outcome: Progressing     Problem: Impaired Gas Exchange  Goal: Patient will demonstrate improved ventilation and adequate oxygenation and participate in treatment regimen within the level of ability/situation.  Description: Target End Date:  Prior to discharge or change in level of care    1.   Assess/monitor rate/rhythm/depth of effort of respirations  2.   Assess oxygenation as ordered  3.   Administer/titrate oxygen as ordered  4.   Position patient for maximum ventilatory efficiency  5.   Turn, cough, and deep breathe  6.   Vital signs, pulse oximetry  7.   Assess color and body temperature  8.   Assess and monitor breath sounds  9.   Encourage deep-slow or pursed-lip breathing exercises  10. Monitor changes in the level of consciousness and mental status  11. Encourage expectoration of sputum; airway suctioning  12. Elevate the head of the bed and position patient for maximum ventilatory efficiency  13. Provide a calm, quiet environment  14. Limit patient's activity during the acute phase and have patient resume activity gradually and increase as individually tolerated  15. Evaluate sleep patterns and limit stimulants such as caffeine  16. Collaborate with RT to administer medications/treatments as ordered  Outcome: Progressing       Patient is not progressing towards the following goals:

## 2023-01-05 LAB
ANION GAP SERPL CALC-SCNC: 9 MMOL/L (ref 7–16)
BACTERIA BLD CULT: NORMAL
BACTERIA BLD CULT: NORMAL
BUN SERPL-MCNC: 47 MG/DL (ref 8–22)
C DIFF DNA SPEC QL NAA+PROBE: NEGATIVE
C DIFF TOX A+B STL QL IA: NEGATIVE
C DIFF TOX GENS STL QL NAA+PROBE: NORMAL
CALCIUM SERPL-MCNC: 9.1 MG/DL (ref 8.5–10.5)
CHLORIDE SERPL-SCNC: 105 MMOL/L (ref 96–112)
CO2 SERPL-SCNC: 28 MMOL/L (ref 20–33)
CREAT SERPL-MCNC: 1.37 MG/DL (ref 0.5–1.4)
ERYTHROCYTE [DISTWIDTH] IN BLOOD BY AUTOMATED COUNT: 45.8 FL (ref 35.9–50)
GFR SERPLBLD CREATININE-BSD FMLA CKD-EPI: 51 ML/MIN/1.73 M 2
GLUCOSE SERPL-MCNC: 88 MG/DL (ref 65–99)
HCT VFR BLD AUTO: 43.4 % (ref 42–52)
HGB BLD-MCNC: 13.8 G/DL (ref 14–18)
MAGNESIUM SERPL-MCNC: 2.3 MG/DL (ref 1.5–2.5)
MCH RBC QN AUTO: 30.7 PG (ref 27–33)
MCHC RBC AUTO-ENTMCNC: 31.8 G/DL (ref 33.7–35.3)
MCV RBC AUTO: 96.4 FL (ref 81.4–97.8)
PHOSPHATE SERPL-MCNC: 3.6 MG/DL (ref 2.5–4.5)
PLATELET # BLD AUTO: 216 K/UL (ref 164–446)
PMV BLD AUTO: 11 FL (ref 9–12.9)
POTASSIUM SERPL-SCNC: 4.6 MMOL/L (ref 3.6–5.5)
RBC # BLD AUTO: 4.5 M/UL (ref 4.7–6.1)
SIGNIFICANT IND 70042: NORMAL
SIGNIFICANT IND 70042: NORMAL
SITE SITE: NORMAL
SITE SITE: NORMAL
SODIUM SERPL-SCNC: 142 MMOL/L (ref 135–145)
SOURCE SOURCE: NORMAL
SOURCE SOURCE: NORMAL
WBC # BLD AUTO: 9.1 K/UL (ref 4.8–10.8)

## 2023-01-05 PROCEDURE — 83735 ASSAY OF MAGNESIUM: CPT

## 2023-01-05 PROCEDURE — A9270 NON-COVERED ITEM OR SERVICE: HCPCS | Performed by: STUDENT IN AN ORGANIZED HEALTH CARE EDUCATION/TRAINING PROGRAM

## 2023-01-05 PROCEDURE — 84100 ASSAY OF PHOSPHORUS: CPT

## 2023-01-05 PROCEDURE — 700102 HCHG RX REV CODE 250 W/ 637 OVERRIDE(OP): Performed by: NURSE PRACTITIONER

## 2023-01-05 PROCEDURE — 99232 SBSQ HOSP IP/OBS MODERATE 35: CPT | Performed by: NURSE PRACTITIONER

## 2023-01-05 PROCEDURE — 700102 HCHG RX REV CODE 250 W/ 637 OVERRIDE(OP): Performed by: STUDENT IN AN ORGANIZED HEALTH CARE EDUCATION/TRAINING PROGRAM

## 2023-01-05 PROCEDURE — 700111 HCHG RX REV CODE 636 W/ 250 OVERRIDE (IP): Performed by: INTERNAL MEDICINE

## 2023-01-05 PROCEDURE — 87493 C DIFF AMPLIFIED PROBE: CPT

## 2023-01-05 PROCEDURE — 87324 CLOSTRIDIUM AG IA: CPT

## 2023-01-05 PROCEDURE — A9270 NON-COVERED ITEM OR SERVICE: HCPCS | Performed by: NURSE PRACTITIONER

## 2023-01-05 PROCEDURE — 80048 BASIC METABOLIC PNL TOTAL CA: CPT

## 2023-01-05 PROCEDURE — 85027 COMPLETE CBC AUTOMATED: CPT

## 2023-01-05 PROCEDURE — 770001 HCHG ROOM/CARE - MED/SURG/GYN PRIV*

## 2023-01-05 PROCEDURE — 700111 HCHG RX REV CODE 636 W/ 250 OVERRIDE (IP): Performed by: STUDENT IN AN ORGANIZED HEALTH CARE EDUCATION/TRAINING PROGRAM

## 2023-01-05 RX ADMIN — ATORVASTATIN CALCIUM 40 MG: 40 TABLET, FILM COATED ORAL at 17:40

## 2023-01-05 RX ADMIN — DOXYCYCLINE 100 MG: 100 TABLET, FILM COATED ORAL at 17:40

## 2023-01-05 RX ADMIN — DOXYCYCLINE 100 MG: 100 TABLET, FILM COATED ORAL at 06:00

## 2023-01-05 RX ADMIN — LISINOPRIL 20 MG: 20 TABLET ORAL at 17:40

## 2023-01-05 RX ADMIN — CARVEDILOL 6.25 MG: 6.25 TABLET, FILM COATED ORAL at 05:45

## 2023-01-05 RX ADMIN — CARVEDILOL 6.25 MG: 6.25 TABLET, FILM COATED ORAL at 17:40

## 2023-01-05 RX ADMIN — HEPARIN SODIUM 5000 UNITS: 5000 INJECTION, SOLUTION INTRAVENOUS; SUBCUTANEOUS at 16:21

## 2023-01-05 RX ADMIN — BUDESONIDE AND FORMOTEROL FUMARATE DIHYDRATE 2 PUFF: 160; 4.5 AEROSOL RESPIRATORY (INHALATION) at 17:41

## 2023-01-05 RX ADMIN — TAMSULOSIN HYDROCHLORIDE 0.4 MG: 0.4 CAPSULE ORAL at 17:41

## 2023-01-05 RX ADMIN — HEPARIN SODIUM 5000 UNITS: 5000 INJECTION, SOLUTION INTRAVENOUS; SUBCUTANEOUS at 05:36

## 2023-01-05 RX ADMIN — LISINOPRIL 20 MG: 20 TABLET ORAL at 05:33

## 2023-01-05 RX ADMIN — TIOTROPIUM BROMIDE INHALATION SPRAY 5 MCG: 3.12 SPRAY, METERED RESPIRATORY (INHALATION) at 06:00

## 2023-01-05 RX ADMIN — HEPARIN SODIUM 5000 UNITS: 5000 INJECTION, SOLUTION INTRAVENOUS; SUBCUTANEOUS at 21:21

## 2023-01-05 RX ADMIN — BUDESONIDE AND FORMOTEROL FUMARATE DIHYDRATE 2 PUFF: 160; 4.5 AEROSOL RESPIRATORY (INHALATION) at 06:00

## 2023-01-05 RX ADMIN — NICOTINE TRANSDERMAL SYSTEM 21 MG: 21 PATCH, EXTENDED RELEASE TRANSDERMAL at 05:36

## 2023-01-05 RX ADMIN — ASPIRIN 81 MG: 81 TABLET, COATED ORAL at 17:40

## 2023-01-05 RX ADMIN — SPIRONOLACTONE 12.5 MG: 25 TABLET ORAL at 05:33

## 2023-01-05 RX ADMIN — PREDNISONE 30 MG: 20 TABLET ORAL at 05:45

## 2023-01-05 RX ADMIN — FOLIC ACID 1 MG: 1 TABLET ORAL at 06:00

## 2023-01-05 RX ADMIN — FUROSEMIDE 20 MG: 20 TABLET ORAL at 06:00

## 2023-01-05 RX ADMIN — THIAMINE HCL TAB 100 MG 100 MG: 100 TAB at 06:00

## 2023-01-05 ASSESSMENT — ENCOUNTER SYMPTOMS
WEIGHT LOSS: 1
COUGH: 1
SHORTNESS OF BREATH: 1
HEARTBURN: 0
BLURRED VISION: 0
DEPRESSION: 0
DOUBLE VISION: 0
SPUTUM PRODUCTION: 1
CHILLS: 0
DIZZINESS: 0
WEAKNESS: 1
FEVER: 0
BACK PAIN: 0
DIARRHEA: 1
PALPITATIONS: 0
NERVOUS/ANXIOUS: 0
ABDOMINAL PAIN: 0

## 2023-01-05 ASSESSMENT — PAIN DESCRIPTION - PAIN TYPE: TYPE: ACUTE PAIN

## 2023-01-05 NOTE — CARE PLAN
The patient is Stable - Low risk of patient condition declining or worsening    Shift Goals  Clinical Goals: maintain O2 sats, ambulate  Patient Goals: ambulate, comfort  Family Goals: pietro    Progress made toward(s) clinical / shift goals:  on going    Patient is   Problem: Fall Risk  Goal: Patient will remain free from falls  Outcome: Progressing     Problem: Knowledge Deficit - Standard  Goal: Patient and family/care givers will demonstrate understanding of plan of care, disease process/condition, diagnostic tests and medications  Outcome: Progressing    progressing towards the following goals:

## 2023-01-06 VITALS
TEMPERATURE: 97.4 F | SYSTOLIC BLOOD PRESSURE: 119 MMHG | DIASTOLIC BLOOD PRESSURE: 62 MMHG | OXYGEN SATURATION: 100 % | BODY MASS INDEX: 14.45 KG/M2 | RESPIRATION RATE: 20 BRPM | HEIGHT: 72 IN | WEIGHT: 106.7 LBS | HEART RATE: 58 BPM

## 2023-01-06 PROBLEM — J96.22 ACUTE ON CHRONIC RESPIRATORY FAILURE WITH HYPOXIA AND HYPERCAPNIA (HCC): Status: RESOLVED | Noted: 2022-12-31 | Resolved: 2023-01-06

## 2023-01-06 PROBLEM — N39.0 URINARY TRACT INFECTION: Status: RESOLVED | Noted: 2022-12-31 | Resolved: 2023-01-06

## 2023-01-06 PROBLEM — R73.9 HYPERGLYCEMIA: Status: RESOLVED | Noted: 2022-12-31 | Resolved: 2023-01-06

## 2023-01-06 PROBLEM — J96.21 ACUTE ON CHRONIC RESPIRATORY FAILURE WITH HYPOXIA AND HYPERCAPNIA (HCC): Status: RESOLVED | Noted: 2022-12-31 | Resolved: 2023-01-06

## 2023-01-06 PROBLEM — R19.7 DIARRHEA: Status: RESOLVED | Noted: 2023-01-04 | Resolved: 2023-01-06

## 2023-01-06 LAB
ANION GAP SERPL CALC-SCNC: 8 MMOL/L (ref 7–16)
BUN SERPL-MCNC: 48 MG/DL (ref 8–22)
CALCIUM SERPL-MCNC: 9.6 MG/DL (ref 8.5–10.5)
CHLORIDE SERPL-SCNC: 103 MMOL/L (ref 96–112)
CO2 SERPL-SCNC: 29 MMOL/L (ref 20–33)
CREAT SERPL-MCNC: 1.28 MG/DL (ref 0.5–1.4)
ERYTHROCYTE [DISTWIDTH] IN BLOOD BY AUTOMATED COUNT: 46 FL (ref 35.9–50)
GFR SERPLBLD CREATININE-BSD FMLA CKD-EPI: 56 ML/MIN/1.73 M 2
GLUCOSE SERPL-MCNC: 99 MG/DL (ref 65–99)
HCT VFR BLD AUTO: 48.9 % (ref 42–52)
HGB BLD-MCNC: 15.4 G/DL (ref 14–18)
MAGNESIUM SERPL-MCNC: 2.3 MG/DL (ref 1.5–2.5)
MCH RBC QN AUTO: 30.4 PG (ref 27–33)
MCHC RBC AUTO-ENTMCNC: 31.5 G/DL (ref 33.7–35.3)
MCV RBC AUTO: 96.4 FL (ref 81.4–97.8)
PHOSPHATE SERPL-MCNC: 3.4 MG/DL (ref 2.5–4.5)
PLATELET # BLD AUTO: 251 K/UL (ref 164–446)
PMV BLD AUTO: 11.2 FL (ref 9–12.9)
POTASSIUM SERPL-SCNC: 5 MMOL/L (ref 3.6–5.5)
RBC # BLD AUTO: 5.07 M/UL (ref 4.7–6.1)
SODIUM SERPL-SCNC: 140 MMOL/L (ref 135–145)
WBC # BLD AUTO: 11.9 K/UL (ref 4.8–10.8)

## 2023-01-06 PROCEDURE — 83735 ASSAY OF MAGNESIUM: CPT

## 2023-01-06 PROCEDURE — 84100 ASSAY OF PHOSPHORUS: CPT

## 2023-01-06 PROCEDURE — 700111 HCHG RX REV CODE 636 W/ 250 OVERRIDE (IP): Performed by: INTERNAL MEDICINE

## 2023-01-06 PROCEDURE — 99239 HOSP IP/OBS DSCHRG MGMT >30: CPT | Mod: GC | Performed by: INTERNAL MEDICINE

## 2023-01-06 PROCEDURE — 80048 BASIC METABOLIC PNL TOTAL CA: CPT

## 2023-01-06 PROCEDURE — 700111 HCHG RX REV CODE 636 W/ 250 OVERRIDE (IP): Performed by: STUDENT IN AN ORGANIZED HEALTH CARE EDUCATION/TRAINING PROGRAM

## 2023-01-06 PROCEDURE — A9270 NON-COVERED ITEM OR SERVICE: HCPCS | Performed by: STUDENT IN AN ORGANIZED HEALTH CARE EDUCATION/TRAINING PROGRAM

## 2023-01-06 PROCEDURE — A9270 NON-COVERED ITEM OR SERVICE: HCPCS | Performed by: NURSE PRACTITIONER

## 2023-01-06 PROCEDURE — 700102 HCHG RX REV CODE 250 W/ 637 OVERRIDE(OP): Performed by: STUDENT IN AN ORGANIZED HEALTH CARE EDUCATION/TRAINING PROGRAM

## 2023-01-06 PROCEDURE — 700102 HCHG RX REV CODE 250 W/ 637 OVERRIDE(OP): Performed by: NURSE PRACTITIONER

## 2023-01-06 PROCEDURE — 85027 COMPLETE CBC AUTOMATED: CPT

## 2023-01-06 RX ORDER — TIOTROPIUM BROMIDE INHALATION SPRAY 3.12 UG/1
5 SPRAY, METERED RESPIRATORY (INHALATION) DAILY
Qty: 4 G | Refills: 0 | Status: SHIPPED | OUTPATIENT
Start: 2023-01-06 | End: 2023-01-06 | Stop reason: SDUPTHER

## 2023-01-06 RX ORDER — HYDROXYZINE HYDROCHLORIDE 25 MG/1
25 TABLET, FILM COATED ORAL 3 TIMES DAILY PRN
Qty: 30 TABLET | Refills: 0 | Status: SHIPPED | OUTPATIENT
Start: 2023-01-06

## 2023-01-06 RX ORDER — ALBUTEROL SULFATE 2.5 MG/3ML
2.5 SOLUTION RESPIRATORY (INHALATION) EVERY 4 HOURS PRN
Qty: 30 EACH | Refills: 0 | Status: SHIPPED | OUTPATIENT
Start: 2023-01-06

## 2023-01-06 RX ORDER — HYDROXYZINE HYDROCHLORIDE 25 MG/1
25 TABLET, FILM COATED ORAL 3 TIMES DAILY PRN
Qty: 30 TABLET | Refills: 0 | Status: SHIPPED | OUTPATIENT
Start: 2023-01-06 | End: 2023-01-06 | Stop reason: SDUPTHER

## 2023-01-06 RX ORDER — GUAIFENESIN 200 MG/1
400 TABLET ORAL EVERY 4 HOURS
Qty: 84 TABLET | Refills: 0 | Status: SHIPPED | OUTPATIENT
Start: 2023-01-06 | End: 2023-01-06 | Stop reason: SDUPTHER

## 2023-01-06 RX ORDER — GUAIFENESIN 200 MG/1
400 TABLET ORAL EVERY 4 HOURS
Qty: 84 TABLET | Refills: 0 | Status: SHIPPED | OUTPATIENT
Start: 2023-01-06

## 2023-01-06 RX ORDER — DOXYCYCLINE 100 MG/1
100 TABLET ORAL EVERY 12 HOURS
Qty: 2 TABLET | Refills: 0 | Status: SHIPPED | OUTPATIENT
Start: 2023-01-06 | End: 2023-01-07

## 2023-01-06 RX ORDER — ALBUTEROL SULFATE 90 UG/1
2 AEROSOL, METERED RESPIRATORY (INHALATION) EVERY 6 HOURS PRN
Qty: 8.5 G | Refills: 0 | Status: SHIPPED | OUTPATIENT
Start: 2023-01-06 | End: 2024-01-01

## 2023-01-06 RX ORDER — TIOTROPIUM BROMIDE INHALATION SPRAY 3.12 UG/1
5 SPRAY, METERED RESPIRATORY (INHALATION) DAILY
Qty: 4 G | Refills: 0 | Status: SHIPPED | OUTPATIENT
Start: 2023-01-06

## 2023-01-06 RX ORDER — DOXYCYCLINE 100 MG/1
100 TABLET ORAL EVERY 12 HOURS
Qty: 2 TABLET | Refills: 0 | Status: SHIPPED | OUTPATIENT
Start: 2023-01-06 | End: 2023-01-06 | Stop reason: SDUPTHER

## 2023-01-06 RX ORDER — TAMSULOSIN HYDROCHLORIDE 0.4 MG/1
0.4 CAPSULE ORAL EVERY EVENING
Qty: 30 CAPSULE | Refills: 0 | Status: SHIPPED | OUTPATIENT
Start: 2023-01-06

## 2023-01-06 RX ADMIN — SPIRONOLACTONE 12.5 MG: 25 TABLET ORAL at 04:32

## 2023-01-06 RX ADMIN — TIOTROPIUM BROMIDE INHALATION SPRAY 5 MCG: 3.12 SPRAY, METERED RESPIRATORY (INHALATION) at 06:00

## 2023-01-06 RX ADMIN — THIAMINE HCL TAB 100 MG 100 MG: 100 TAB at 06:00

## 2023-01-06 RX ADMIN — BUDESONIDE AND FORMOTEROL FUMARATE DIHYDRATE 2 PUFF: 160; 4.5 AEROSOL RESPIRATORY (INHALATION) at 06:00

## 2023-01-06 RX ADMIN — FOLIC ACID 1 MG: 1 TABLET ORAL at 06:00

## 2023-01-06 RX ADMIN — NICOTINE TRANSDERMAL SYSTEM 21 MG: 21 PATCH, EXTENDED RELEASE TRANSDERMAL at 04:31

## 2023-01-06 RX ADMIN — LISINOPRIL 20 MG: 20 TABLET ORAL at 04:34

## 2023-01-06 RX ADMIN — CARVEDILOL 6.25 MG: 6.25 TABLET, FILM COATED ORAL at 04:35

## 2023-01-06 RX ADMIN — HEPARIN SODIUM 5000 UNITS: 5000 INJECTION, SOLUTION INTRAVENOUS; SUBCUTANEOUS at 04:37

## 2023-01-06 RX ADMIN — DOXYCYCLINE 100 MG: 100 TABLET, FILM COATED ORAL at 06:00

## 2023-01-06 RX ADMIN — FUROSEMIDE 20 MG: 20 TABLET ORAL at 06:00

## 2023-01-06 RX ADMIN — PREDNISONE 30 MG: 20 TABLET ORAL at 04:32

## 2023-01-06 ASSESSMENT — PAIN DESCRIPTION - PAIN TYPE: TYPE: ACUTE PAIN

## 2023-01-06 NOTE — DISCHARGE INSTRUCTIONS
Take your medications as prescribed.  If you feel anxious, take one of your Atarax pills.  Use your nebulizer with albuterol if you feel like you are getting short of breath.  [facility] will be calling to establish a time for them to come and see you in your home.  You need to make an appointment with your primary care provider for hospital follow-up within 1 week.  Return to the ED for any chest pain, shortness of breath, difficulty breathing, fever, or bleeding.      HF Patient Discharge Instructions  Monitor your weight daily, and maintain a weight chart, to track your weight changes.   Activity as tolerated, unless your Doctor has ordered otherwise. Other activity order:  Follow a low fat, low cholesterol, low salt diet unless instructed otherwise by your Doctor. Read the labels on the back of food products and track your intake of fat, cholesterol and salt.   Fluid Restriction No. If a Fluid Restriction has been ordered by your Doctor, measure fluids with a measuring cup to ensure that you are not exceeding the restriction.   No smoking.  Oxygen Yes. If your Doctor has ordered that you wear Oxygen at home, it is important to wear it as ordered.  Did you receive an explanation from staff on the importance of taking each of your medications and why it is necessary to keep taking them unless your doctor says to stop? Yes  Were all of your questions answered about how to manage your heart failure and what to do if you have increased signs and symptoms after you go home? Yes  Do you feel like your heart failure care team involved you in the care treatment plan and allowed you to make decisions regarding your care while in the hospital and addressed any discharge needs you might have? Yes    See the educational handout provided at discharge for more information on monitoring your daily weight, activity and diet. This also explains more about Heart Failure, symptoms of a flare-up and some of the tests  that you have undergone.     Warning Signs of a Flare-Up include:  Swelling in the ankles or lower legs.  Shortness of breath, while at rest, or while doing normal activities.   Shortness of breath at night when in bed, or coughing in bed.   Requiring more pillows to sleep at night, or needing to sit up at night to sleep.  Feeling weak, dizzy or fatigued.     When to call your Doctor:  Call [facility] seven days a week from 8:00 a.m. to 8:00 p.m. for medical questions [phone].  Call your Primary Care Physician or Cardiologist if:   You experience any pain radiating to your jaw or neck.  You have any difficulty breathing.  You experience weight gain of 3 lbs in a day or 5 lbs in a week.   You feel any palpitations or irregular heartbeats.  You become dizzy or lose consciousness.   If you have had an angiogram or had a pacemaker or AICD placed, and experience:  Bleeding, drainage or swelling at the surgical / puncture site.  Fever greater than 100.0 F  Shock from internal defibrillator.  Cool and / or numb extremities.     Please access the [facility] My HF Guide/Heart Failure Interactive Workbook:   [email or web address]

## 2023-01-06 NOTE — CARE PLAN
The patient is Stable - Low risk of patient condition declining or worsening    Shift Goals  Clinical Goals: ambulate, stool sample  Patient Goals: comfort  Family Goals: pietro    Progress made toward(s) clinical / shift goals:    Problem: Knowledge Deficit - COPD  Goal: Patient/significant other demonstrates understanding of disease process, utilization of the Action Plan, medications and discharge instruction  Outcome: Progressing     Problem: Risk for Infection - COPD  Goal: Patient will remain free from signs and symptoms of infection  Outcome: Progressing     Problem: Nutrition - Advanced  Goal: Patient will display progressive weight gain toward goal have adequate food and fluid intake  Outcome: Progressing     Problem: Ineffective Airway Clearance  Goal: Patient will maintain patent airway with clear/clearing breath sounds  Outcome: Progressing     Problem: Impaired Gas Exchange  Goal: Patient will demonstrate improved ventilation and adequate oxygenation and participate in treatment regimen within the level of ability/situation.  Outcome: Progressing     Problem: Risk for Aspiration  Goal: Patient's risk for aspiration will be absent or decrease  Outcome: Progressing     Problem: Self Care  Goal: Patient will have the ability to perform ADLs independently or with assistance (bathe, groom, dress, toilet and feed)  Outcome: Progressing     Problem: Knowledge Deficit - Standard  Goal: Patient and family/care givers will demonstrate understanding of plan of care, disease process/condition, diagnostic tests and medications  Outcome: Progressing     Problem: Fall Risk  Goal: Patient will remain free from falls  Outcome: Progressing     Problem: Skin Integrity  Goal: Skin integrity is maintained or improved  Outcome: Progressing       Patient is not progressing towards the following goals:

## 2023-01-06 NOTE — CARE PLAN
The patient is Stable - Low risk of patient condition declining or worsening    Shift Goals  Clinical Goals: ambulate, stool sample  Patient Goals: comfort  Family Goals: pietro    Progress made toward(s) clinical / shift goals:  on going    Patient is not progressing towards the following goals:

## 2023-01-06 NOTE — DISCHARGE SUMMARY
Discharge Summary    CHIEF COMPLAINT ON ADMISSION  Chief Complaint   Patient presents with    Shortness of Breath     Acute onset SOB at midnight. Pt was 68% on usual home 4L-->placed on CPAP by EMS.        Reason for Admission  EMS     Admission Date  12/31/2022    CODE STATUS  DNAR/DNI    HPI & HOSPITAL COURSE    82 y.o. male who presented 12/31/2022 with shortness of breath.  Mr. 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  States after his discharge is symptoms improved apparently until 12/31/2022 paramedics were called and found his oxygen saturations to be 68% on his usual 4 L.  Was found to have a COPD exacerbation is admitted to the intensive care unit for IV Solu-Medrol, bronchodilators, and rescue BiPAP.  His viral panel was negative.  Upon admission he had made it clear that he has a known DO NOT RESUSCITATE DO NOT INTUBATE status.  In the emergency room he is found to have a positive urinalysis and was started on IV Rocephin as he self catheterizes about 4 times per day at home.    Patient transitioned off ICU on 1/2/23 and remained in the hospital while he continued to improve.  COPD navigator worked with the patient and he was given a nebulizer with albuterol for discharge.  He did experience some diarrhea however his C. difficile testing was negative.  Dietary saw the patient and recommends oral supplementation.  [facility] who will follow him.    On the day of discharge, patient seen and examined.  He has no acute complaints and is afebrile and hemodynamically stable.  His pulmonary medications have been refilled and [facility]  He was given 2 more days of oral doxycycline and Atarax to help with his  anxiety.  He reports that he has all of his cardiac medications already.  He also says he has plenty of oxygen at home but will require a tank to get home, delivered by [facility].  His Melara has been discontinued and he reports having plenty of his straight cath supplies at home.  He knows that [facility] and the [facility] navig[facility] will be reaching out to him within the next couple days for follow-up.  He will be taught how to use his nebulizer prior to discharge.  I also emphasized to him that he needs to follow-up with his primary care provider within 1 week.  He has been given a taxi voucher to travel home.    Plan of care discussed with patient and all questions answered.    Therefore, he is discharged in fair and stable condition to home with organized home healthcare and close outpatient follow-up.    The patient met 2-midnight criteria for an inpatient stay at the time of discharge.    Discharge Date  1/6/2023    FOLLOW UP ITEMS POST DISCHARGE  Take your medications as prescribed.  If you feel anxious, take one of your Atarax pills.  Use your nebulizer with albuterol if you feel like you are getting short of breath.  [facility] will be calling to establish a time for them to come and see you in your home.  You need to make an appointment with your primary care provider for hospital follow-up within 1 week.  Return to the ED for any chest pain, shortness of breath, difficulty breathing, fever, or bleeding.    DISCHARGE DIAGNOSES  Principal Problem (Resolved):    Acute on chronic respiratory failure with hypoxia and hypercapnia (HCC) POA: Yes  Active Problems:    Benign prostatic hyperplasia with urinary retention POA: Yes    Tobacco abuse POA: Yes    Chronic kidney disease, stage III (moderate) (HCC) POA: Yes    Acute exacerbation of chronic obstructive pulmonary disease (COPD) (HCC) POA: Yes    Heart failure with reduced ejection fraction and diastolic dysfunction (HCC) POA: Yes    Severe protein-calorie  malnutrition (HCC) POA: Yes  Resolved Problems:    Hyperglycemia POA: Unknown    Urinary tract infection POA: Yes    Diarrhea POA: Unknown      FOLLOW UP  No future appointments.  Deangelo Hayden M.D.  [address] [place] [id number] [phone]    Follow up in 1 week(s)        MEDICATIONS ON DISCHARGE     Medication List        START taking these medications        Instructions   doxycycline monohydrate 100 MG tablet  Commonly known as: ADOXA   Take 1 Tablet by mouth every 12 hours for 1 day.  Dose: 100 mg     hydrOXYzine HCl 25 MG Tabs  Commonly known as: ATARAX   Take 1 Tablet by mouth 3 times a day as needed for Itching.  Dose: 25 mg            CHANGE how you take these medications        Instructions   * albuterol 2.5mg/3ml Nebu solution for nebulization  What changed: You were already taking a medication with the same name, and this prescription was added. Make sure you understand how and when to take each.  Commonly known as: PROVENTIL   Inhale 3 mL by nebulization every four hours as needed for Shortness of Breath.  Dose: 2.5 mg     * albuterol 108 (90 Base) MCG/ACT Aers inhalation aerosol  What changed: Another medication with the same name was added. Make sure you understand how and when to take each.   Inhale 2 Puffs every 6 hours as needed for Shortness of Breath for up to 360 days.  Dose: 2 Puff           * This list has 2 medication(s) that are the same as other medications prescribed for you. Read the directions carefully, and ask your doctor or other care provider to review them with you.                CONTINUE taking these medications        Instructions   aspirin EC 81 MG Tbec  Commonly known as: ECOTRIN   Take 81 mg by mouth every evening.  Dose: 81 mg     atorvastatin 40 MG Tabs  Commonly known as: LIPITOR   Take 40 mg by mouth every day.  Dose: 40 mg     carvedilol 6.25 MG Tabs  Commonly known as: COREG   Take 6.25 mg by mouth 2 times a day with meals.  Dose: 6.25 mg     fluticasone-salmeterol  250-50 MCG/DOSE Aepb  Commonly known as: ADVAIR   Inhale 1 Puff every 12 hours.  Dose: 1 Puff     folic acid 1 MG Tabs  Commonly known as: FOLVITE   Take 1 Tablet by mouth every day for 360 days.  Dose: 1 mg     furosemide 40 MG Tabs  Commonly known as: LASIX   Take 0.5 Tablet by mouth every day for 30 days. Do not take this medication if you feel dizzy  Dose: 20 mg     guaifenesin 200 MG tablet   Take 2 Tablets by mouth every 4 hours.  Dose: 400 mg     Home Care Oxygen   Inhale 4 L/min continuous. [facility]  Dose: 4 L/min     lisinopril 40 MG tablet  Commonly known as: PRINIVIL   Take 40 mg by mouth every day.  Dose: 40 mg     polyethylene glycol/lytes 17 g Pack  Commonly known as: MIRALAX   Take 17 g by mouth every day.  Dose: 17 g     Spiriva Respimat 2.5 mcg/Act Aers  Generic drug: tiotropium   Inhale 2 Inhalation every day.  Dose: 5 mcg     spironolactone 25 MG Tabs  Commonly known as: ALDACTONE   Take 0.5 Tablet by mouth every day.  Dose: 12.5 mg     tamsulosin 0.4 MG capsule  Commonly known as: FLOMAX   Take 1 Capsule by mouth every evening.  Dose: 0.4 mg     terbinafine 250 MG Tabs  Commonly known as: LAMISIL   Take 250 mg by mouth every day. For 12 weeks  Dose: 250 mg     thiamine 100 MG tablet  Commonly known as: THIAMINE   Take 1 Tablet by mouth every day for 360 days.  Dose: 100 mg     vitamin D3 1000 Unit (25 mcg) Tabs  Commonly known as: cholecalciferol   Take 2,000 Units by mouth every day.  Dose: 2,000 Units              Allergies  No Known Allergies    DIET  Orders Placed This Encounter   Procedures    Diet Order Diet: Cardiac     Standing Status:   Standing     Number of Occurrences:   1     Order Specific Question:   Diet:     Answer:   Cardiac [6]       ACTIVITY  As tolerated.  Weight bearing as tolerated    CONSULTATIONS  COPD navigator     PROCEDURES  None    LABORATORY  Lab Results   Component Value Date    SODIUM 142 01/05/2023    POTASSIUM 4.6 01/05/2023    CHLORIDE 105 01/05/2023     CO2 28 01/05/2023    GLUCOSE 88 01/05/2023    BUN 47 (H) 01/05/2023    CREATININE 1.37 01/05/2023    CREATININE 1.2 03/28/2009        Lab Results   Component Value Date    WBC 9.1 01/05/2023    HEMOGLOBIN 13.8 (L) 01/05/2023    HEMATOCRIT 43.4 01/05/2023    PLATELETCT 216 01/05/2023        Total time of the discharge process exceeds 37 minutes.

## 2023-01-06 NOTE — PROGRESS NOTES
Received bedside report from day shift RN   pt assessment completed. Pt A&O x4 Denies pain at this time. POC discussed with pt about negative c diff result and possible discharge in the morning Pt denies any additional needs at this time. Bed locked and in lowest position, bed alarm on Pt educated on fall risk and verbalized understanding, call light within reach, hourly rounding initiated.

## 2023-01-06 NOTE — PROGRESS NOTES
Hospital Medicine Daily Progress Note    Date of Service  1/5/2023    Chief Complaint  Laron Doherty is a 82 y.o. male admitted 12/31/2022 with shortness of breath    Hospital Course  82 y.o. male who presented 12/31/2022 with shortness of breath.  Mr. Doherty has a past medical history of oxygen dependent COPD on 4 L oxygen as well as known cardiomyopathy with ejection fraction 20% that was recently admitted to the [facility] last week because of respiratory complaints and was there for 2 nights.  States after his discharge is symptoms improved apparently until 12/31/2022 paramedics were called and found his oxygen saturations to be 68% on his usual 4 L.  Was found to have a COPD exacerbation is admitted to the intensive care unit for IV Solu-Medrol, bronchodilators, and rescue BiPAP.  His viral panel was negative.  Upon admission he had made it clear that he has a known DO NOT RESUSCITATE DO NOT INTUBATE status.  In the emergency room he is found to have a positive urinalysis and was started on IV Rocephin as he self catheterizes about 4 times per day at home.    Patient transitioned off ICU on 1/2/23.     Interval Problem Update  1/5/2023: Patient seen and examined.  He reports over 5 episodes of diarrhea.  Does not feel well.    -Vital signs reviewed, afebrile and hemodynamically stable on baseline 4 L nasal cannula  -Labs reviewed, unremarkable  -C. Difficile pending  -Continue doxycycline  -Orders placed for home health and home nebulizer    I have discussed this patient's plan of care and discharge plan at IDT rounds today with Case Management, Nursing, Nursing leadership, and other members of the IDT team.    Consultants/Specialty  pulmonary    Code Status  DNAR/DNI    Disposition  Patient is not medically cleared for discharge.   Anticipate discharge to to home with close outpatient follow-up.  I have placed the appropriate orders for post-discharge needs.    Review of Systems  Review of Systems    Constitutional:  Positive for malaise/fatigue and weight loss. Negative for chills and fever.   HENT:  Negative for ear pain and hearing loss.    Eyes:  Negative for blurred vision and double vision.   Respiratory:  Positive for cough, sputum production and shortness of breath.    Cardiovascular:  Negative for chest pain, palpitations and leg swelling.   Gastrointestinal:  Positive for diarrhea. Negative for abdominal pain and heartburn.   Genitourinary:  Negative for dysuria.   Musculoskeletal:  Negative for back pain.   Neurological:  Positive for weakness. Negative for dizziness.   Psychiatric/Behavioral:  Negative for depression. The patient is not nervous/anxious.    All other systems reviewed and are negative.     Physical Exam  Temp:  [35.9 °C (96.7 °F)-36.4 °C (97.5 °F)] 35.9 °C (96.7 °F)  Pulse:  [65-74] 65  Resp:  [16-18] 18  BP: (108-150)/(61-91) 117/87  SpO2:  [100 %] 100 %    Physical Exam  Vitals and nursing note reviewed.   Constitutional:       Appearance: He is ill-appearing.      Comments: Cachectic   HENT:      Head: Normocephalic and atraumatic.      Right Ear: External ear normal.      Left Ear: External ear normal.      Nose: Nose normal.      Mouth/Throat:      Mouth: Mucous membranes are moist.      Pharynx: Oropharynx is clear.   Eyes:      General: No scleral icterus.  Cardiovascular:      Rate and Rhythm: Normal rate and regular rhythm.      Pulses: Normal pulses.      Heart sounds: Normal heart sounds.   Pulmonary:      Effort: Pulmonary effort is normal.      Comments: Diminished breath sounds throughout  Abdominal:      General: Abdomen is flat. Bowel sounds are normal.      Palpations: Abdomen is soft.      Tenderness: There is no abdominal tenderness.   Genitourinary:     Comments: Melara catheter in place  Musculoskeletal:         General: No swelling. Normal range of motion.      Cervical back: Normal range of motion.   Skin:     General: Skin is warm and dry.      Capillary  Refill: Capillary refill takes less than 2 seconds.      Coloration: Skin is pale.   Neurological:      Mental Status: He is alert and oriented to person, place, and time.      Motor: Weakness present.   Psychiatric:         Mood and Affect: Mood normal.         Behavior: Behavior normal.       Fluids    Intake/Output Summary (Last 24 hours) at 1/5/2023 1726  Last data filed at 1/5/2023 1332  Gross per 24 hour   Intake 480 ml   Output 1750 ml   Net -1270 ml         Laboratory  Recent Labs     01/03/23  0702 01/04/23  0251 01/05/23  0609   WBC 10.6 10.3 9.1   RBC 4.46* 4.47* 4.50*   HEMOGLOBIN 13.5* 13.9* 13.8*   HEMATOCRIT 42.0 42.0 43.4   MCV 94.2 94.0 96.4   MCH 30.3 31.1 30.7   MCHC 32.1* 33.1* 31.8*   RDW 45.1 44.6 45.8   PLATELETCT 207 204 216   MPV 12.0 11.5 11.0       Recent Labs     01/03/23  0702 01/04/23  0251 01/05/23  0609   SODIUM 139 138 142   POTASSIUM 4.4 4.6 4.6   CHLORIDE 104 105 105   CO2 25 23 28   GLUCOSE 86 88 88   BUN 40* 46* 47*   CREATININE 1.29 1.37 1.37   CALCIUM 9.0 9.0 9.1                     Imaging  DX-CHEST-PORTABLE (1 VIEW)   Final Result      Again seen diffuse bilateral interstitial infiltrates which may represent chronic interstitial lung disease versus interstitial edema or atypical infection.             Assessment/Plan  * Acute on chronic respiratory failure with hypoxia and hypercapnia (HCC)- (present on admission)  Assessment & Plan  He is on 4 L nasal cannula oxygen is his baseline and had acute hypoxia with hypercapnia requiring ICU admission with rescue BiPAP    Diarrhea  Assessment & Plan  At risk for C. difficile given recent antibiotic therapy  C. difficile ordered and pending    Severe protein-calorie malnutrition (HCC)- (present on admission)  Assessment & Plan  There is a clinical diagnosis present upon admission in setting of severe cachexia in the secondary to advanced COPD.  His BMI is 14  Supplements and encourage oral intake    Heart failure with reduced  ejection fraction and diastolic dysfunction (HCC)- (present on admission)  Assessment & Plan  Echocardiogram reveals an ejection fraction of 20%  Goal-directed therapy with Aldactone, lisinopril, Coreg, and Lasix      Urinary tract infection- (present on admission)  Assessment & Plan  This is a complicated urinary tract infection given his self-catheterization  He has been initiated on IV Rocephin  Urine culture grew staph epidermidis  Antibiotics de-escalated to doxycycline     Acute exacerbation of chronic obstructive pulmonary disease (COPD) (HCC)- (present on admission)  Assessment & Plan  Treated with bronchodilators, rescue BiPAP, IV Solu-Medrol and ICU admission  Smoking cessation discussed  Pulmonary consultation on 1/2/2023    Patient does report improvement however continues to have coughing fits and episodes of hypoxia at night  He is anxious being discharged and needing to contact EMS, working with case management to see if patient can qualify for a nebulizer for home  Nebulizer and home health orders placed    Chronic kidney disease, stage III (moderate) (HCC)- (present on admission)  Assessment & Plan  Baseline is unknown   His Cr has been between 1.5 and 1.7    Tobacco abuse- (present on admission)  Assessment & Plan  Cessation discussed    Benign prostatic hyperplasia with urinary retention- (present on admission)  Assessment & Plan  He undergoes self catheterization at home  A mayorga was placed in the ICU   Patient uncomfortable with the straight cath supplies we have in the hospital, will plan to continue Mayorga catheter on day of discharge and patient can continue to straight cath at home  Home health orders placed         VTE prophylaxis: heparin ppx    I have performed a physical exam and reviewed and updated ROS and Plan today (1/5/2023). In review of yesterday's note (1/4/2023), there are no changes except as documented above.

## 2023-01-06 NOTE — DISCHARGE PLANNING
Case Management Discharge Planning    Admission Date: 12/31/2022  GMLOS: 3.4  ALOS: 6    6-Clicks ADL Score: 24  6-Clicks Mobility Score: 18      Anticipated Discharge Dispo: Discharge Disposition: Discharged to home/self care (01)    DME Needed: Yes    DME Ordered: Yes    Action(s) Taken: RNCM notified pt does not have tank available to DC home.     Spoke to Kallie from [facility]. Per Kallie, pt is on their service and they can deliver tank to hospital. ETA: 1200.    RNCM updated Kallie, pt will need O2 delivered to DC Kallie acevedo to update .    Escalations Completed: None    Medically Clear: Yes    Next Steps:  f/u with medical team to discuss DC needs and barriers    Barriers to Discharge: DME

## 2023-01-06 NOTE — HEART FAILURE PROGRAM
Carvedilol  Lisinopril  Spironolactone   Cessation of nicotine discussed in APRN note 1/5/23  No atrial arrhythmia  No DM  Vaccines appropriate  No indication for hydral dinitrate per facesheet  Requested appt